# Patient Record
Sex: FEMALE | Race: WHITE | HISPANIC OR LATINO
[De-identification: names, ages, dates, MRNs, and addresses within clinical notes are randomized per-mention and may not be internally consistent; named-entity substitution may affect disease eponyms.]

---

## 2017-11-16 PROBLEM — Z00.00 ENCOUNTER FOR PREVENTIVE HEALTH EXAMINATION: Status: ACTIVE | Noted: 2017-11-16

## 2018-02-20 ENCOUNTER — APPOINTMENT (OUTPATIENT)
Dept: ENDOCRINOLOGY | Facility: CLINIC | Age: 83
End: 2018-02-20
Payer: MEDICARE

## 2018-02-20 VITALS
WEIGHT: 114 LBS | BODY MASS INDEX: 20.98 KG/M2 | OXYGEN SATURATION: 98 % | HEIGHT: 62 IN | DIASTOLIC BLOOD PRESSURE: 80 MMHG | TEMPERATURE: 97.9 F | SYSTOLIC BLOOD PRESSURE: 152 MMHG | HEART RATE: 66 BPM

## 2018-02-20 DIAGNOSIS — Z82.49 FAMILY HISTORY OF ISCHEMIC HEART DISEASE AND OTHER DISEASES OF THE CIRCULATORY SYSTEM: ICD-10-CM

## 2018-02-20 DIAGNOSIS — I10 ESSENTIAL (PRIMARY) HYPERTENSION: ICD-10-CM

## 2018-02-20 DIAGNOSIS — J44.9 CHRONIC OBSTRUCTIVE PULMONARY DISEASE, UNSPECIFIED: ICD-10-CM

## 2018-02-20 DIAGNOSIS — Z86.39 PERSONAL HISTORY OF OTHER ENDOCRINE, NUTRITIONAL AND METABOLIC DISEASE: ICD-10-CM

## 2018-02-20 DIAGNOSIS — Z86.79 PERSONAL HISTORY OF OTHER DISEASES OF THE CIRCULATORY SYSTEM: ICD-10-CM

## 2018-02-20 PROCEDURE — 99214 OFFICE O/P EST MOD 30 MIN: CPT

## 2018-06-01 ENCOUNTER — APPOINTMENT (OUTPATIENT)
Dept: ENDOCRINOLOGY | Facility: CLINIC | Age: 83
End: 2018-06-01
Payer: MEDICARE

## 2018-06-01 VITALS
RESPIRATION RATE: 15 BRPM | WEIGHT: 114 LBS | SYSTOLIC BLOOD PRESSURE: 131 MMHG | HEART RATE: 54 BPM | OXYGEN SATURATION: 97 % | HEIGHT: 62 IN | BODY MASS INDEX: 20.98 KG/M2 | DIASTOLIC BLOOD PRESSURE: 77 MMHG | TEMPERATURE: 97.7 F

## 2018-06-01 PROCEDURE — 99214 OFFICE O/P EST MOD 30 MIN: CPT

## 2018-06-03 RX ORDER — LOSARTAN POTASSIUM AND HYDROCHLOROTHIAZIDE 12.5; 1 MG/1; MG/1
100-12.5 TABLET ORAL
Qty: 90 | Refills: 0 | Status: COMPLETED | COMMUNITY
Start: 2018-05-11

## 2018-06-03 RX ORDER — UMECLIDINIUM 62.5 UG/1
62.5 AEROSOL, POWDER ORAL
Qty: 30 | Refills: 0 | Status: COMPLETED | COMMUNITY
Start: 2017-12-14

## 2018-06-03 RX ORDER — NEBIVOLOL HYDROCHLORIDE 5 MG/1
5 TABLET ORAL
Qty: 90 | Refills: 0 | Status: COMPLETED | COMMUNITY
Start: 2018-05-11

## 2018-09-04 ENCOUNTER — APPOINTMENT (OUTPATIENT)
Dept: ENDOCRINOLOGY | Facility: CLINIC | Age: 83
End: 2018-09-04
Payer: MEDICARE

## 2018-09-04 VITALS
BODY MASS INDEX: 20.98 KG/M2 | HEIGHT: 62 IN | DIASTOLIC BLOOD PRESSURE: 76 MMHG | TEMPERATURE: 97.7 F | RESPIRATION RATE: 15 BRPM | HEART RATE: 51 BPM | OXYGEN SATURATION: 97 % | SYSTOLIC BLOOD PRESSURE: 127 MMHG | WEIGHT: 114 LBS

## 2018-09-04 PROCEDURE — 99214 OFFICE O/P EST MOD 30 MIN: CPT

## 2018-09-09 ENCOUNTER — RX RENEWAL (OUTPATIENT)
Age: 83
End: 2018-09-09

## 2018-11-08 ENCOUNTER — APPOINTMENT (OUTPATIENT)
Dept: ENDOCRINOLOGY | Facility: CLINIC | Age: 83
End: 2018-11-08

## 2018-11-09 ENCOUNTER — APPOINTMENT (OUTPATIENT)
Dept: ENDOCRINOLOGY | Facility: CLINIC | Age: 83
End: 2018-11-09
Payer: MEDICARE

## 2018-11-09 VITALS
BODY MASS INDEX: 20.98 KG/M2 | WEIGHT: 114 LBS | TEMPERATURE: 97.3 F | OXYGEN SATURATION: 96 % | DIASTOLIC BLOOD PRESSURE: 72 MMHG | SYSTOLIC BLOOD PRESSURE: 150 MMHG | RESPIRATION RATE: 16 BRPM | HEART RATE: 60 BPM | HEIGHT: 62 IN

## 2018-11-09 PROCEDURE — 96372 THER/PROPH/DIAG INJ SC/IM: CPT | Mod: 59

## 2018-11-10 RX ADMIN — DENOSUMAB 0 MG/ML: 60 INJECTION SUBCUTANEOUS at 00:00

## 2018-11-12 RX ORDER — DENOSUMAB 60 MG/ML
60 INJECTION SUBCUTANEOUS
Qty: 1 | Refills: 0 | Status: COMPLETED | OUTPATIENT
Start: 2018-11-10

## 2018-12-04 ENCOUNTER — APPOINTMENT (OUTPATIENT)
Dept: ENDOCRINOLOGY | Facility: CLINIC | Age: 83
End: 2018-12-04
Payer: MEDICARE

## 2018-12-04 VITALS
HEIGHT: 62 IN | SYSTOLIC BLOOD PRESSURE: 135 MMHG | BODY MASS INDEX: 20.8 KG/M2 | DIASTOLIC BLOOD PRESSURE: 75 MMHG | TEMPERATURE: 97.8 F | HEART RATE: 57 BPM | RESPIRATION RATE: 16 BRPM | OXYGEN SATURATION: 97 % | WEIGHT: 113 LBS

## 2018-12-04 PROCEDURE — 99213 OFFICE O/P EST LOW 20 MIN: CPT

## 2019-03-08 ENCOUNTER — RX RENEWAL (OUTPATIENT)
Age: 84
End: 2019-03-08

## 2019-04-02 ENCOUNTER — APPOINTMENT (OUTPATIENT)
Dept: ENDOCRINOLOGY | Facility: CLINIC | Age: 84
End: 2019-04-02
Payer: MEDICARE

## 2019-04-02 VITALS
TEMPERATURE: 98.1 F | DIASTOLIC BLOOD PRESSURE: 77 MMHG | HEIGHT: 62 IN | OXYGEN SATURATION: 97 % | BODY MASS INDEX: 20.61 KG/M2 | RESPIRATION RATE: 16 BRPM | WEIGHT: 112 LBS | HEART RATE: 52 BPM | SYSTOLIC BLOOD PRESSURE: 159 MMHG

## 2019-04-02 PROCEDURE — 99214 OFFICE O/P EST MOD 30 MIN: CPT

## 2019-04-02 NOTE — HISTORY OF PRESENT ILLNESS
[FreeTextEntry1] : Patient is doing well, her weight has not change. Denies being fatigued. No cold or heat intolerance, no palpitations. No dryness of the skin or hair loss. No chest pain or SOB. Taking the Levothyroxine regularly ½ hour before breakfast. No history of neck radiation. The thyroid tests are within normal limits. The US thyroid done 3/17/18 is not ready yet\par

## 2019-04-02 NOTE — ASSESSMENT
[FreeTextEntry1] : Patient is clinically euthyroid\par Will continue the same treatment\par Patient will call next week for the US thyroid report\par She is due for her Prolia injection

## 2019-04-12 ENCOUNTER — APPOINTMENT (OUTPATIENT)
Dept: ENDOCRINOLOGY | Facility: CLINIC | Age: 84
End: 2019-04-12
Payer: MEDICARE

## 2019-04-12 PROCEDURE — 96372 THER/PROPH/DIAG INJ SC/IM: CPT

## 2019-04-12 RX ORDER — DENOSUMAB 60 MG/ML
60 INJECTION SUBCUTANEOUS
Qty: 0 | Refills: 0 | Status: COMPLETED | OUTPATIENT
Start: 2019-04-12

## 2019-04-12 RX ADMIN — DENOSUMAB 0 MG/ML: 60 INJECTION SUBCUTANEOUS at 00:00

## 2019-07-29 ENCOUNTER — APPOINTMENT (OUTPATIENT)
Dept: ENDOCRINOLOGY | Facility: CLINIC | Age: 84
End: 2019-07-29
Payer: MEDICARE

## 2019-07-29 VITALS
BODY MASS INDEX: 19.88 KG/M2 | WEIGHT: 108 LBS | HEIGHT: 62 IN | OXYGEN SATURATION: 97 % | DIASTOLIC BLOOD PRESSURE: 72 MMHG | RESPIRATION RATE: 16 BRPM | TEMPERATURE: 97.6 F | HEART RATE: 59 BPM | SYSTOLIC BLOOD PRESSURE: 162 MMHG

## 2019-07-29 PROCEDURE — 99214 OFFICE O/P EST MOD 30 MIN: CPT

## 2019-07-29 NOTE — HISTORY OF PRESENT ILLNESS
[FreeTextEntry1] : Patient is doing well, her weight has decreased. She feeling tired, no cold intolerance, no palpitations. No dryness of the skin, no hair loss. No chest pain or SOB. Taking the Levothyroxine regularly ½ hour before breakfast. No history of neck radiation.\par The thyroid tests are within normal limits. The US thyroid disclosed small nodules\par \par

## 2019-07-29 NOTE — PHYSICAL EXAM
[No Acute Distress] : no acute distress [Alert] : alert [Normal Sclera/Conjunctiva] : normal sclera/conjunctiva [Normal Outer Ear/Nose] : the ears and nose were normal in appearance [PERRL] : pupils equal, round and reactive to light [Normal TMs] : both tympanic membranes were normal [Thyroid Not Enlarged] : the thyroid was not enlarged [No Neck Mass] : no neck mass was observed [No Respiratory Distress] : no respiratory distress [Normal Rate and Effort] : normal respiratory rhythm and effort [Normal PMI] : the apical impulse was normal [Normal Rate] : heart rate was normal  [Carotids Normal] : carotid pulses were normal with no bruits [Normal Reflexes] : deep tendon reflexes were 2+ and symmetric [No Motor Deficits] : the motor exam was normal

## 2019-07-29 NOTE — ASSESSMENT
[FreeTextEntry1] : The patient is clinically euthyroid\par The thyroid nodules are small stable\par Will continue the same treatment\par Prolia was ordered for the osteoporosis

## 2019-10-08 ENCOUNTER — RX RENEWAL (OUTPATIENT)
Age: 84
End: 2019-10-08

## 2019-10-16 ENCOUNTER — APPOINTMENT (OUTPATIENT)
Dept: ENDOCRINOLOGY | Facility: CLINIC | Age: 84
End: 2019-10-16
Payer: MEDICARE

## 2019-10-16 VITALS — SYSTOLIC BLOOD PRESSURE: 122 MMHG | DIASTOLIC BLOOD PRESSURE: 74 MMHG

## 2019-10-16 VITALS — DIASTOLIC BLOOD PRESSURE: 76 MMHG | HEART RATE: 61 BPM | SYSTOLIC BLOOD PRESSURE: 126 MMHG | RESPIRATION RATE: 16 BRPM

## 2019-10-16 PROCEDURE — 96372 THER/PROPH/DIAG INJ SC/IM: CPT

## 2019-10-16 RX ORDER — DENOSUMAB 60 MG/ML
60 INJECTION SUBCUTANEOUS
Qty: 1 | Refills: 0 | Status: COMPLETED | OUTPATIENT
Start: 2019-10-16

## 2019-10-16 RX ADMIN — DENOSUMAB 0 MG/ML: 60 INJECTION SUBCUTANEOUS at 00:00

## 2019-11-06 ENCOUNTER — RX RENEWAL (OUTPATIENT)
Age: 84
End: 2019-11-06

## 2019-12-02 ENCOUNTER — APPOINTMENT (OUTPATIENT)
Dept: ENDOCRINOLOGY | Facility: CLINIC | Age: 84
End: 2019-12-02
Payer: MEDICARE

## 2019-12-02 VITALS
TEMPERATURE: 97.3 F | WEIGHT: 110 LBS | OXYGEN SATURATION: 94 % | RESPIRATION RATE: 16 BRPM | SYSTOLIC BLOOD PRESSURE: 132 MMHG | HEIGHT: 62 IN | HEART RATE: 81 BPM | BODY MASS INDEX: 20.24 KG/M2 | DIASTOLIC BLOOD PRESSURE: 73 MMHG

## 2019-12-02 PROCEDURE — 99214 OFFICE O/P EST MOD 30 MIN: CPT

## 2019-12-02 NOTE — PHYSICAL EXAM
[Alert] : alert [No Acute Distress] : no acute distress [PERRL] : pupils equal, round and reactive to light [Normal Outer Ear/Nose] : the ears and nose were normal in appearance [Normal Sclera/Conjunctiva] : normal sclera/conjunctiva [Normal TMs] : both tympanic membranes were normal [Thyroid Not Enlarged] : the thyroid was not enlarged [No Neck Mass] : no neck mass was observed [No Respiratory Distress] : no respiratory distress [Normal Rate and Effort] : normal respiratory rhythm and effort [Normal PMI] : the apical impulse was normal [Normal Rate] : heart rate was normal  [Carotids Normal] : carotid pulses were normal with no bruits [Normal Reflexes] : deep tendon reflexes were 2+ and symmetric [No Motor Deficits] : the motor exam was normal

## 2019-12-02 NOTE — HISTORY OF PRESENT ILLNESS
[FreeTextEntry1] : Patient is doing well, denies feeling tired, having cold intolerance, or palpitations. No dryness of the skin or hair loss. No chest pain or SOB. Taking the Levothyroxine regularly ½ hour before breakfast. Her weight has not changed.  No history of neck radiation. The thyroid tests are within normal limits. The US thyroid was not done. She has chronic cough she is in Lisinopril. Advised to speak with PCP.\par \par

## 2019-12-02 NOTE — ASSESSMENT
[FreeTextEntry1] : The patient is clinically euthyroid\par The US thyroid was denied by the insurance\par Her Na is low and she has chronic cough\par Advised to see her PCP to check the Lisinopril

## 2020-05-12 RX ORDER — DENOSUMAB 60 MG/ML
60 INJECTION SUBCUTANEOUS
Qty: 1 | Refills: 0 | Status: COMPLETED | COMMUNITY
Start: 2018-09-09 | End: 2020-05-12

## 2020-05-22 ENCOUNTER — APPOINTMENT (OUTPATIENT)
Dept: ENDOCRINOLOGY | Facility: CLINIC | Age: 85
End: 2020-05-22
Payer: MEDICARE

## 2020-05-22 VITALS — TEMPERATURE: 98 F | DIASTOLIC BLOOD PRESSURE: 83 MMHG | SYSTOLIC BLOOD PRESSURE: 153 MMHG

## 2020-05-22 VITALS — DIASTOLIC BLOOD PRESSURE: 86 MMHG | SYSTOLIC BLOOD PRESSURE: 157 MMHG

## 2020-05-22 PROCEDURE — 96372 THER/PROPH/DIAG INJ SC/IM: CPT

## 2020-05-22 RX ORDER — DENOSUMAB 60 MG/ML
60 INJECTION SUBCUTANEOUS
Qty: 1 | Refills: 0 | Status: COMPLETED | OUTPATIENT
Start: 2020-05-22

## 2020-05-22 RX ADMIN — DENOSUMAB 0 MG/ML: 60 INJECTION SUBCUTANEOUS at 00:00

## 2020-06-13 ENCOUNTER — INPATIENT (INPATIENT)
Facility: HOSPITAL | Age: 85
LOS: 4 days | Discharge: HOME HEALTH | DRG: 640 | End: 2020-06-18
Attending: INTERNAL MEDICINE | Admitting: INTERNAL MEDICINE
Payer: MEDICARE

## 2020-06-13 VITALS
HEART RATE: 70 BPM | DIASTOLIC BLOOD PRESSURE: 95 MMHG | SYSTOLIC BLOOD PRESSURE: 174 MMHG | OXYGEN SATURATION: 100 % | WEIGHT: 111.99 LBS | RESPIRATION RATE: 18 BRPM | TEMPERATURE: 97 F

## 2020-06-13 DIAGNOSIS — E87.1 HYPO-OSMOLALITY AND HYPONATREMIA: ICD-10-CM

## 2020-06-13 DIAGNOSIS — Z98.891 HISTORY OF UTERINE SCAR FROM PREVIOUS SURGERY: Chronic | ICD-10-CM

## 2020-06-13 LAB
ACETONE SERPL-MCNC: NEGATIVE — SIGNIFICANT CHANGE UP
ALBUMIN SERPL ELPH-MCNC: 3.3 G/DL — LOW (ref 3.5–5)
ALBUMIN SERPL ELPH-MCNC: 3.5 G/DL — SIGNIFICANT CHANGE UP (ref 3.5–5)
ALP SERPL-CCNC: 79 U/L — SIGNIFICANT CHANGE UP (ref 40–120)
ALP SERPL-CCNC: 94 U/L — SIGNIFICANT CHANGE UP (ref 40–120)
ALT FLD-CCNC: 43 U/L DA — SIGNIFICANT CHANGE UP (ref 10–60)
ALT FLD-CCNC: 44 U/L DA — SIGNIFICANT CHANGE UP (ref 10–60)
ANION GAP SERPL CALC-SCNC: 11 MMOL/L — SIGNIFICANT CHANGE UP (ref 5–17)
ANION GAP SERPL CALC-SCNC: 12 MMOL/L — SIGNIFICANT CHANGE UP (ref 5–17)
ANION GAP SERPL CALC-SCNC: 12 MMOL/L — SIGNIFICANT CHANGE UP (ref 5–17)
APPEARANCE UR: CLEAR — SIGNIFICANT CHANGE UP
APTT BLD: 30.6 SEC — SIGNIFICANT CHANGE UP (ref 27.5–36.3)
AST SERPL-CCNC: 36 U/L — SIGNIFICANT CHANGE UP (ref 10–40)
AST SERPL-CCNC: 36 U/L — SIGNIFICANT CHANGE UP (ref 10–40)
BACTERIA # UR AUTO: ABNORMAL /HPF
BASOPHILS # BLD AUTO: 0.04 K/UL — SIGNIFICANT CHANGE UP (ref 0–0.2)
BASOPHILS NFR BLD AUTO: 0.6 % — SIGNIFICANT CHANGE UP (ref 0–2)
BILIRUB SERPL-MCNC: 0.6 MG/DL — SIGNIFICANT CHANGE UP (ref 0.2–1.2)
BILIRUB SERPL-MCNC: 0.8 MG/DL — SIGNIFICANT CHANGE UP (ref 0.2–1.2)
BILIRUB UR-MCNC: NEGATIVE — SIGNIFICANT CHANGE UP
BUN SERPL-MCNC: 16 MG/DL — SIGNIFICANT CHANGE UP (ref 7–18)
BUN SERPL-MCNC: 20 MG/DL — HIGH (ref 7–18)
BUN SERPL-MCNC: 22 MG/DL — HIGH (ref 7–18)
CALCIUM SERPL-MCNC: 8.3 MG/DL — LOW (ref 8.4–10.5)
CALCIUM SERPL-MCNC: 8.4 MG/DL — SIGNIFICANT CHANGE UP (ref 8.4–10.5)
CALCIUM SERPL-MCNC: 8.8 MG/DL — SIGNIFICANT CHANGE UP (ref 8.4–10.5)
CHLORIDE SERPL-SCNC: 72 MMOL/L — LOW (ref 96–108)
CHLORIDE SERPL-SCNC: 75 MMOL/L — LOW (ref 96–108)
CHLORIDE SERPL-SCNC: 80 MMOL/L — LOW (ref 96–108)
CHLORIDE UR-SCNC: 13 MMOL/L — SIGNIFICANT CHANGE UP
CO2 SERPL-SCNC: 29 MMOL/L — SIGNIFICANT CHANGE UP (ref 22–31)
CO2 SERPL-SCNC: 29 MMOL/L — SIGNIFICANT CHANGE UP (ref 22–31)
CO2 SERPL-SCNC: 30 MMOL/L — SIGNIFICANT CHANGE UP (ref 22–31)
COLOR SPEC: YELLOW — SIGNIFICANT CHANGE UP
CREAT ?TM UR-MCNC: <13 MG/DL — SIGNIFICANT CHANGE UP
CREAT SERPL-MCNC: 0.76 MG/DL — SIGNIFICANT CHANGE UP (ref 0.5–1.3)
CREAT SERPL-MCNC: 0.81 MG/DL — SIGNIFICANT CHANGE UP (ref 0.5–1.3)
CREAT SERPL-MCNC: 0.95 MG/DL — SIGNIFICANT CHANGE UP (ref 0.5–1.3)
D DIMER BLD IA.RAPID-MCNC: 179 NG/ML DDU — SIGNIFICANT CHANGE UP
DIFF PNL FLD: ABNORMAL
EOSINOPHIL # BLD AUTO: 0.03 K/UL — SIGNIFICANT CHANGE UP (ref 0–0.5)
EOSINOPHIL NFR BLD AUTO: 0.4 % — SIGNIFICANT CHANGE UP (ref 0–6)
EPI CELLS # UR: ABNORMAL /HPF
FERRITIN SERPL-MCNC: 342 NG/ML — HIGH (ref 15–150)
FIBRINOGEN PPP-MCNC: 453 MG/DL — SIGNIFICANT CHANGE UP (ref 350–510)
GLUCOSE SERPL-MCNC: 105 MG/DL — HIGH (ref 70–99)
GLUCOSE SERPL-MCNC: 107 MG/DL — HIGH (ref 70–99)
GLUCOSE SERPL-MCNC: 120 MG/DL — HIGH (ref 70–99)
GLUCOSE UR QL: 50 MG/DL
HCT VFR BLD CALC: 32.9 % — LOW (ref 34.5–45)
HGB BLD-MCNC: 11.6 G/DL — SIGNIFICANT CHANGE UP (ref 11.5–15.5)
IMM GRANULOCYTES NFR BLD AUTO: 0.3 % — SIGNIFICANT CHANGE UP (ref 0–1.5)
INR BLD: 0.96 RATIO — SIGNIFICANT CHANGE UP (ref 0.88–1.16)
KETONES UR-MCNC: NEGATIVE — SIGNIFICANT CHANGE UP
LACTATE SERPL-SCNC: 0.8 MMOL/L — SIGNIFICANT CHANGE UP (ref 0.7–2)
LDH SERPL L TO P-CCNC: 244 U/L — HIGH (ref 120–225)
LEUKOCYTE ESTERASE UR-ACNC: NEGATIVE — SIGNIFICANT CHANGE UP
LYMPHOCYTES # BLD AUTO: 1.26 K/UL — SIGNIFICANT CHANGE UP (ref 1–3.3)
LYMPHOCYTES # BLD AUTO: 17.8 % — SIGNIFICANT CHANGE UP (ref 13–44)
MAGNESIUM SERPL-MCNC: 2 MG/DL — SIGNIFICANT CHANGE UP (ref 1.6–2.6)
MCHC RBC-ENTMCNC: 28.8 PG — SIGNIFICANT CHANGE UP (ref 27–34)
MCHC RBC-ENTMCNC: 35.3 GM/DL — SIGNIFICANT CHANGE UP (ref 32–36)
MCV RBC AUTO: 81.6 FL — SIGNIFICANT CHANGE UP (ref 80–100)
MONOCYTES # BLD AUTO: 0.61 K/UL — SIGNIFICANT CHANGE UP (ref 0–0.9)
MONOCYTES NFR BLD AUTO: 8.6 % — SIGNIFICANT CHANGE UP (ref 2–14)
NEUTROPHILS # BLD AUTO: 5.1 K/UL — SIGNIFICANT CHANGE UP (ref 1.8–7.4)
NEUTROPHILS NFR BLD AUTO: 72.3 % — SIGNIFICANT CHANGE UP (ref 43–77)
NITRITE UR-MCNC: NEGATIVE — SIGNIFICANT CHANGE UP
NRBC # BLD: 0 /100 WBCS — SIGNIFICANT CHANGE UP (ref 0–0)
NT-PROBNP SERPL-SCNC: 505 PG/ML — HIGH (ref 0–450)
OB PNL STL: NEGATIVE — SIGNIFICANT CHANGE UP
OSMOLALITY SERPL: 238 MOSMOL/KG — LOW (ref 280–301)
OSMOLALITY UR: 109 MOS/KG — SIGNIFICANT CHANGE UP (ref 50–1200)
OSMOLALITY UR: 110 MOS/KG — SIGNIFICANT CHANGE UP (ref 50–1200)
PH UR: 7 — SIGNIFICANT CHANGE UP (ref 5–8)
PLATELET # BLD AUTO: 232 K/UL — SIGNIFICANT CHANGE UP (ref 150–400)
POTASSIUM SERPL-MCNC: 3.4 MMOL/L — LOW (ref 3.5–5.3)
POTASSIUM SERPL-MCNC: 3.5 MMOL/L — SIGNIFICANT CHANGE UP (ref 3.5–5.3)
POTASSIUM SERPL-MCNC: 3.9 MMOL/L — SIGNIFICANT CHANGE UP (ref 3.5–5.3)
POTASSIUM SERPL-SCNC: 3.4 MMOL/L — LOW (ref 3.5–5.3)
POTASSIUM SERPL-SCNC: 3.5 MMOL/L — SIGNIFICANT CHANGE UP (ref 3.5–5.3)
POTASSIUM SERPL-SCNC: 3.9 MMOL/L — SIGNIFICANT CHANGE UP (ref 3.5–5.3)
POTASSIUM UR-SCNC: 7 MMOL/L — SIGNIFICANT CHANGE UP
POTASSIUM UR-SCNC: 8 MMOL/L — SIGNIFICANT CHANGE UP
PROT ?TM UR-MCNC: 6 MG/DL — SIGNIFICANT CHANGE UP (ref 0–12)
PROT SERPL-MCNC: 6.5 G/DL — SIGNIFICANT CHANGE UP (ref 6–8.3)
PROT SERPL-MCNC: 7.1 G/DL — SIGNIFICANT CHANGE UP (ref 6–8.3)
PROT UR-MCNC: NEGATIVE — SIGNIFICANT CHANGE UP
PROTHROM AB SERPL-ACNC: 10.8 SEC — SIGNIFICANT CHANGE UP (ref 10–12.9)
RBC # BLD: 4.03 M/UL — SIGNIFICANT CHANGE UP (ref 3.8–5.2)
RBC # FLD: 12.8 % — SIGNIFICANT CHANGE UP (ref 10.3–14.5)
RBC CASTS # UR COMP ASSIST: SIGNIFICANT CHANGE UP /HPF (ref 0–2)
SODIUM SERPL-SCNC: 113 MMOL/L — CRITICAL LOW (ref 135–145)
SODIUM SERPL-SCNC: 116 MMOL/L — CRITICAL LOW (ref 135–145)
SODIUM SERPL-SCNC: 121 MMOL/L — LOW (ref 135–145)
SODIUM UR-SCNC: 17 MMOL/L — SIGNIFICANT CHANGE UP
SODIUM UR-SCNC: 19 MMOL/L — SIGNIFICANT CHANGE UP
SP GR SPEC: 1 — LOW (ref 1.01–1.02)
TROPONIN I SERPL-MCNC: 0.01 NG/ML — SIGNIFICANT CHANGE UP (ref 0–0.04)
URATE UR-MCNC: 8.3 MG/DL — SIGNIFICANT CHANGE UP
UROBILINOGEN FLD QL: NEGATIVE — SIGNIFICANT CHANGE UP
WBC # BLD: 7.06 K/UL — SIGNIFICANT CHANGE UP (ref 3.8–10.5)
WBC # FLD AUTO: 7.06 K/UL — SIGNIFICANT CHANGE UP (ref 3.8–10.5)
WBC UR QL: SIGNIFICANT CHANGE UP /HPF (ref 0–5)

## 2020-06-13 PROCEDURE — 99291 CRITICAL CARE FIRST HOUR: CPT

## 2020-06-13 PROCEDURE — 71045 X-RAY EXAM CHEST 1 VIEW: CPT | Mod: 26

## 2020-06-13 PROCEDURE — 93010 ELECTROCARDIOGRAM REPORT: CPT

## 2020-06-13 RX ORDER — CHLORHEXIDINE GLUCONATE 213 G/1000ML
1 SOLUTION TOPICAL DAILY
Refills: 0 | Status: DISCONTINUED | OUTPATIENT
Start: 2020-06-13 | End: 2020-06-15

## 2020-06-13 RX ORDER — POTASSIUM CHLORIDE 20 MEQ
40 PACKET (EA) ORAL ONCE
Refills: 0 | Status: COMPLETED | OUTPATIENT
Start: 2020-06-13 | End: 2020-06-13

## 2020-06-13 RX ORDER — AZITHROMYCIN 500 MG/1
500 TABLET, FILM COATED ORAL EVERY 24 HOURS
Refills: 0 | Status: DISCONTINUED | OUTPATIENT
Start: 2020-06-14 | End: 2020-06-17

## 2020-06-13 RX ORDER — SODIUM CHLORIDE 9 MG/ML
1000 INJECTION, SOLUTION INTRAVENOUS
Refills: 0 | Status: DISCONTINUED | OUTPATIENT
Start: 2020-06-13 | End: 2020-06-14

## 2020-06-13 RX ORDER — AZITHROMYCIN 500 MG/1
500 TABLET, FILM COATED ORAL ONCE
Refills: 0 | Status: COMPLETED | OUTPATIENT
Start: 2020-06-13 | End: 2020-06-13

## 2020-06-13 RX ORDER — POTASSIUM CHLORIDE 20 MEQ
40 PACKET (EA) ORAL ONCE
Refills: 0 | Status: DISCONTINUED | OUTPATIENT
Start: 2020-06-13 | End: 2020-06-13

## 2020-06-13 RX ORDER — PANTOPRAZOLE SODIUM 20 MG/1
40 TABLET, DELAYED RELEASE ORAL
Refills: 0 | Status: DISCONTINUED | OUTPATIENT
Start: 2020-06-13 | End: 2020-06-18

## 2020-06-13 RX ORDER — LOSARTAN POTASSIUM 100 MG/1
100 TABLET, FILM COATED ORAL DAILY
Refills: 0 | Status: DISCONTINUED | OUTPATIENT
Start: 2020-06-13 | End: 2020-06-13

## 2020-06-13 RX ORDER — CEFTRIAXONE 500 MG/1
1000 INJECTION, POWDER, FOR SOLUTION INTRAMUSCULAR; INTRAVENOUS EVERY 24 HOURS
Refills: 0 | Status: DISCONTINUED | OUTPATIENT
Start: 2020-06-14 | End: 2020-06-18

## 2020-06-13 RX ORDER — ENOXAPARIN SODIUM 100 MG/ML
40 INJECTION SUBCUTANEOUS DAILY
Refills: 0 | Status: DISCONTINUED | OUTPATIENT
Start: 2020-06-13 | End: 2020-06-18

## 2020-06-13 RX ORDER — SODIUM CHLORIDE 9 MG/ML
500 INJECTION INTRAMUSCULAR; INTRAVENOUS; SUBCUTANEOUS ONCE
Refills: 0 | Status: COMPLETED | OUTPATIENT
Start: 2020-06-13 | End: 2020-06-13

## 2020-06-13 RX ORDER — LEVOTHYROXINE SODIUM 125 MCG
75 TABLET ORAL
Refills: 0 | Status: DISCONTINUED | OUTPATIENT
Start: 2020-06-13 | End: 2020-06-18

## 2020-06-13 RX ORDER — DESMOPRESSIN ACETATE 0.1 MG/1
1 TABLET ORAL ONCE
Refills: 0 | Status: COMPLETED | OUTPATIENT
Start: 2020-06-13 | End: 2020-06-13

## 2020-06-13 RX ORDER — CEFTRIAXONE 500 MG/1
1000 INJECTION, POWDER, FOR SOLUTION INTRAMUSCULAR; INTRAVENOUS ONCE
Refills: 0 | Status: COMPLETED | OUTPATIENT
Start: 2020-06-13 | End: 2020-06-13

## 2020-06-13 RX ORDER — LEVOTHYROXINE SODIUM 125 MCG
50 TABLET ORAL
Refills: 0 | Status: DISCONTINUED | OUTPATIENT
Start: 2020-06-13 | End: 2020-06-18

## 2020-06-13 RX ADMIN — Medication 40 MILLIEQUIVALENT(S): at 19:47

## 2020-06-13 RX ADMIN — SODIUM CHLORIDE 100 MILLILITER(S): 9 INJECTION, SOLUTION INTRAVENOUS at 21:38

## 2020-06-13 RX ADMIN — CEFTRIAXONE 100 MILLIGRAM(S): 500 INJECTION, POWDER, FOR SOLUTION INTRAMUSCULAR; INTRAVENOUS at 16:55

## 2020-06-13 RX ADMIN — SODIUM CHLORIDE 500 MILLILITER(S): 9 INJECTION INTRAMUSCULAR; INTRAVENOUS; SUBCUTANEOUS at 18:17

## 2020-06-13 RX ADMIN — Medication 40 MILLIEQUIVALENT(S): at 18:15

## 2020-06-13 RX ADMIN — DESMOPRESSIN ACETATE 1 MICROGRAM(S): 0.1 TABLET ORAL at 21:38

## 2020-06-13 RX ADMIN — AZITHROMYCIN 255 MILLIGRAM(S): 500 TABLET, FILM COATED ORAL at 16:55

## 2020-06-13 NOTE — ED PROVIDER NOTE - CARE PLAN
Principal Discharge DX:	Hyponatremia  Secondary Diagnosis:	PNA (pneumonia)  Secondary Diagnosis:	Weakness

## 2020-06-13 NOTE — H&P ADULT - ATTENDING COMMENTS
84 yo F from home, AAOX3, ambulates with cane with PMH of HTN and hypothyroidism BIBA to ED c/o feeling fatigue and generalized weakness for past 3 weeks, MADDEN and decreased appetite. Found Na 113, and CXR noted b/l lower lobes infiltrates. Admit to ICU for Hyponatremia and PNA       Problems:   - Hyponatremia Symptomatic   - PNA,   - COVID-19  need to be r/o  - HTN  - Hypothyroidism       Plan :  -admit pat to icu  -isolate: contact and airborn  -hyponatremia work up  -Na correction 12 mEq/24 hr  -avoid over correction   -BMP q4h   -hyponatremia probable due to thiazide diuretics  -neph eval  -blood pressure control

## 2020-06-13 NOTE — ED ADULT NURSE NOTE - ED STAT RN HANDOFF DETAILS
Patient remains aler6t and verbally responsive breathing unlabored awaiting for ICU bed availability, Pt care endorsed to Sabi JOHNSON, safety and comfort maintained.

## 2020-06-13 NOTE — ED ADULT NURSE NOTE - OBJECTIVE STATEMENT
BIB EMS from home with c/o generalized weakness for past 3 weeks, MADDEN, decreased appetite ,denies chest pain, cough, dizziness

## 2020-06-13 NOTE — H&P ADULT - NSHPLABSRESULTS_GEN_ALL_CORE
11.6   7.06  )-----------( 232      ( 13 Jun 2020 14:13 )             32.9       06-13    x   |  x   |  x   ----------------------------<  x   x    |  x   |  0.81    Ca    8.4      13 Jun 2020 14:13  Mg     2.0     06-13    TPro  7.1  /  Alb  x   /  TBili  0.8  /  DBili  x   /  AST  36  /  ALT  44  /  AlkPhos  94  06-13                  PT/INR - ( 13 Jun 2020 14:13 )   PT: 10.8 sec;   INR: 0.96 ratio         PTT - ( 13 Jun 2020 14:13 )  PTT:30.6 sec    Lactate Trend  06-13 @ 16:46 Lactate:0.8       CARDIAC MARKERS ( 13 Jun 2020 14:13 )  0.015 ng/mL / x     / x     / x     / x            CAPILLARY BLOOD GLUCOSE

## 2020-06-13 NOTE — ED ADULT NURSE NOTE - DOES PATIENT HAVE ADVANCE DIRECTIVE
Hide Additional Notes?: No Detail Level: Detailed Additional Notes (Optional): Vitamin K. Arnica gel. Pineapples. Pt has those at home. Try to avoid trauma. unkn/No

## 2020-06-13 NOTE — ED PROVIDER NOTE - OBJECTIVE STATEMENT
85 y.o. female BIBA c/o feeling tired, weak for past 3 weeks, MADDEN, pt needs to ambulate with a cane, mild chills, decreased appetite, no CP, edema, palpitations, coughing, dizziness, orthopnea, last BM this am, no BRBPR, fever, pt lives with her .  Pt checked her B/P, was high

## 2020-06-13 NOTE — H&P ADULT - NSHPPHYSICALEXAM_GEN_ALL_CORE
ICU Vital Signs Last 24 Hrs  T(C): 36.7 (13 Jun 2020 15:47), Max: 36.7 (13 Jun 2020 15:47)  T(F): 98 (13 Jun 2020 15:47), Max: 98 (13 Jun 2020 15:47)  HR: 71 (13 Jun 2020 15:47) (70 - 71)  BP: 172/87 (13 Jun 2020 15:47) (172/87 - 174/95)  BP(mean): --  ABP: --  ABP(mean): --  RR: 18 (13 Jun 2020 15:47) (18 - 18)  SpO2: 97% (13 Jun 2020 15:47) (97% - 100%)

## 2020-06-13 NOTE — ED ADULT NURSE NOTE - NSIMPLEMENTINTERV_GEN_ALL_ED
Implemented All Fall Risk Interventions:  [place] to call system. Call bell, personal items and telephone within reach. Instruct patient to call for assistance. Room bathroom lighting operational. Non-slip footwear when patient is off stretcher. Physically safe environment: no spills, clutter or unnecessary equipment. Stretcher in lowest position, wheels locked, appropriate side rails in place. Provide visual cue, wrist band, yellow gown, etc. Monitor gait and stability. Monitor for mental status changes and reorient to person, place, and time. Review medications for side effects contributing to fall risk. Reinforce activity limits and safety measures with patient and family.

## 2020-06-13 NOTE — H&P ADULT - ASSESSMENT
84 yo F from home, AAOX3, ambulates with cane with PMH of HTN and hypothyroidism BIBA to ED c/o feeling fatigue and generalized weakness for past 3 weeks, MADDEN and decreased appetite. Pt checked her B/P, was high 190/80.  Pt denies fever, CP, edema, palpitations, coughing, dizziness, orthopnea, abd pain, n/v/c/d. Last BM this am.     ED course, vitals noted temp 97, HR 70, /95, RR 18, SO2 97% on RA. Labs noted Na 113,  CXR noted b/l lower lobes infiltrates. s/p zithromax and rocephin in ED.       Plan:     Neuro:   Respi:   CVS:   GI:   Heme:   Endo:   ID:  Renal:   PPx: 86 yo F from home, AAOX3, ambulates with cane with PMH of HTN and hypothyroidism BIBA to ED c/o feeling fatigue and generalized weakness for past 3 weeks, MADDEN and decreased appetite. Found Na 113, and CXR noted b/l lower lobes infiltrates. Admit to ICU for Hyponatremia and PNA       Problems:   -Hyponatremia   -PNA, r/o COVID   -HTN  -Hypothyroidism     Plan:  Neuro:   AAOX3, no issue     Respi:   Saturation 97% on RA , comfortable, no sob/work of breath     CVS:   HTN  -hold HCTZ, resume losartan and bystolic     GI:   Dash/fluid restriction diet    Heme:   no issue     Endo:   Hypothyroidism  -c/w synthroid home dose   -f/u TSH  keep FS   f/u Hb A1c    ID:  PNA  -CXR noted b/l lower lobes infiltrates.   -c/w zithromax and rocephin   -f/u BCx and r/o COVID       Renal:   Hyponatremia 2/2 diuretics vs SIADH   -Na 113  -will give NS 500cc  -f/u urine lytes, urine osmolarity and serum osmolarity   -BMP q4hrs and Urine Na/K/Osmo q4hrs   -Nephro consulted Dr. Gupta     PPx: on dvt and gi ppx    GOC: DNR/DNI/no invasive procedure.

## 2020-06-13 NOTE — H&P ADULT - HISTORY OF PRESENT ILLNESS
84 yo F from home, AAOX3, ambulates with cane with PMH of HTN and hypothyroidism BIBA to ED c/o feeling fatigue and generalized weakness for past 3 weeks, MADDEN and decreased appetite. Pt checked her B/P, was high 190/80.  Pt denies fever, CP, edema, palpitations, coughing, dizziness, orthopnea, abd pain, n/v/c/d. Last BM this am.     ED course, vitals noted temp 97, HR 70, /95, RR 18, SO2 97% on RA. Labs noted Na 113,  CXR noted b/l lower lobes infiltrates. s/p zithromax and rocephin in ED.     GOC: DNR/DNI/NO invasive procedure. HCP Nicole Nataly [phone].

## 2020-06-13 NOTE — ED ADULT NURSE REASSESSMENT NOTE - NS ED NURSE REASSESS COMMENT FT1
received pt awake alert not in acute distress,with saline lock intact,no redness no swelling noted,hooked to cardiac monitor,waiting for ICU bed.

## 2020-06-14 DIAGNOSIS — J18.9 PNEUMONIA, UNSPECIFIED ORGANISM: ICD-10-CM

## 2020-06-14 DIAGNOSIS — E87.1 HYPO-OSMOLALITY AND HYPONATREMIA: ICD-10-CM

## 2020-06-14 DIAGNOSIS — J44.9 CHRONIC OBSTRUCTIVE PULMONARY DISEASE, UNSPECIFIED: ICD-10-CM

## 2020-06-14 DIAGNOSIS — I10 ESSENTIAL (PRIMARY) HYPERTENSION: ICD-10-CM

## 2020-06-14 LAB
A1C WITH ESTIMATED AVERAGE GLUCOSE RESULT: 5.5 % — SIGNIFICANT CHANGE UP (ref 4–5.6)
ALBUMIN SERPL ELPH-MCNC: 3 G/DL — LOW (ref 3.5–5)
ALP SERPL-CCNC: 64 U/L — SIGNIFICANT CHANGE UP (ref 40–120)
ALT FLD-CCNC: 37 U/L DA — SIGNIFICANT CHANGE UP (ref 10–60)
ANION GAP SERPL CALC-SCNC: 10 MMOL/L — SIGNIFICANT CHANGE UP (ref 5–17)
ANION GAP SERPL CALC-SCNC: 11 MMOL/L — SIGNIFICANT CHANGE UP (ref 5–17)
ANION GAP SERPL CALC-SCNC: 8 MMOL/L — SIGNIFICANT CHANGE UP (ref 5–17)
ANION GAP SERPL CALC-SCNC: 9 MMOL/L — SIGNIFICANT CHANGE UP (ref 5–17)
AST SERPL-CCNC: 31 U/L — SIGNIFICANT CHANGE UP (ref 10–40)
BASOPHILS # BLD AUTO: 0.03 K/UL — SIGNIFICANT CHANGE UP (ref 0–0.2)
BASOPHILS NFR BLD AUTO: 0.5 % — SIGNIFICANT CHANGE UP (ref 0–2)
BILIRUB SERPL-MCNC: 0.6 MG/DL — SIGNIFICANT CHANGE UP (ref 0.2–1.2)
BUN SERPL-MCNC: 10 MG/DL — SIGNIFICANT CHANGE UP (ref 7–18)
BUN SERPL-MCNC: 10 MG/DL — SIGNIFICANT CHANGE UP (ref 7–18)
BUN SERPL-MCNC: 11 MG/DL — SIGNIFICANT CHANGE UP (ref 7–18)
BUN SERPL-MCNC: 13 MG/DL — SIGNIFICANT CHANGE UP (ref 7–18)
BUN SERPL-MCNC: 14 MG/DL — SIGNIFICANT CHANGE UP (ref 7–18)
BUN SERPL-MCNC: 15 MG/DL — SIGNIFICANT CHANGE UP (ref 7–18)
CALCIUM SERPL-MCNC: 7.2 MG/DL — LOW (ref 8.4–10.5)
CALCIUM SERPL-MCNC: 7.3 MG/DL — LOW (ref 8.4–10.5)
CALCIUM SERPL-MCNC: 7.6 MG/DL — LOW (ref 8.4–10.5)
CALCIUM SERPL-MCNC: 7.9 MG/DL — LOW (ref 8.4–10.5)
CALCIUM SERPL-MCNC: 8 MG/DL — LOW (ref 8.4–10.5)
CALCIUM SERPL-MCNC: 8.1 MG/DL — LOW (ref 8.4–10.5)
CHLORIDE SERPL-SCNC: 79 MMOL/L — LOW (ref 96–108)
CHLORIDE SERPL-SCNC: 80 MMOL/L — LOW (ref 96–108)
CHLORIDE SERPL-SCNC: 81 MMOL/L — LOW (ref 96–108)
CHLORIDE SERPL-SCNC: 82 MMOL/L — LOW (ref 96–108)
CHLORIDE SERPL-SCNC: 83 MMOL/L — LOW (ref 96–108)
CHLORIDE SERPL-SCNC: 83 MMOL/L — LOW (ref 96–108)
CO2 SERPL-SCNC: 25 MMOL/L — SIGNIFICANT CHANGE UP (ref 22–31)
CO2 SERPL-SCNC: 26 MMOL/L — SIGNIFICANT CHANGE UP (ref 22–31)
CO2 SERPL-SCNC: 26 MMOL/L — SIGNIFICANT CHANGE UP (ref 22–31)
CO2 SERPL-SCNC: 29 MMOL/L — SIGNIFICANT CHANGE UP (ref 22–31)
CO2 SERPL-SCNC: 30 MMOL/L — SIGNIFICANT CHANGE UP (ref 22–31)
CO2 SERPL-SCNC: 30 MMOL/L — SIGNIFICANT CHANGE UP (ref 22–31)
CREAT ?TM UR-MCNC: 21 MG/DL — SIGNIFICANT CHANGE UP
CREAT SERPL-MCNC: 0.55 MG/DL — SIGNIFICANT CHANGE UP (ref 0.5–1.3)
CREAT SERPL-MCNC: 0.56 MG/DL — SIGNIFICANT CHANGE UP (ref 0.5–1.3)
CREAT SERPL-MCNC: 0.58 MG/DL — SIGNIFICANT CHANGE UP (ref 0.5–1.3)
CREAT SERPL-MCNC: 0.6 MG/DL — SIGNIFICANT CHANGE UP (ref 0.5–1.3)
CREAT SERPL-MCNC: 0.61 MG/DL — SIGNIFICANT CHANGE UP (ref 0.5–1.3)
CREAT SERPL-MCNC: 0.69 MG/DL — SIGNIFICANT CHANGE UP (ref 0.5–1.3)
CRP SERPL-MCNC: <0.1 MG/DL — SIGNIFICANT CHANGE UP (ref 0–0.4)
EOSINOPHIL # BLD AUTO: 0.04 K/UL — SIGNIFICANT CHANGE UP (ref 0–0.5)
EOSINOPHIL NFR BLD AUTO: 0.7 % — SIGNIFICANT CHANGE UP (ref 0–6)
ESTIMATED AVERAGE GLUCOSE: 111 MG/DL — SIGNIFICANT CHANGE UP (ref 68–114)
GLUCOSE SERPL-MCNC: 107 MG/DL — HIGH (ref 70–99)
GLUCOSE SERPL-MCNC: 108 MG/DL — HIGH (ref 70–99)
GLUCOSE SERPL-MCNC: 118 MG/DL — HIGH (ref 70–99)
GLUCOSE SERPL-MCNC: 127 MG/DL — HIGH (ref 70–99)
GLUCOSE SERPL-MCNC: 127 MG/DL — HIGH (ref 70–99)
GLUCOSE SERPL-MCNC: 89 MG/DL — SIGNIFICANT CHANGE UP (ref 70–99)
HCT VFR BLD CALC: 34.3 % — LOW (ref 34.5–45)
HGB BLD-MCNC: 11.9 G/DL — SIGNIFICANT CHANGE UP (ref 11.5–15.5)
IMM GRANULOCYTES NFR BLD AUTO: 0.3 % — SIGNIFICANT CHANGE UP (ref 0–1.5)
LYMPHOCYTES # BLD AUTO: 1.17 K/UL — SIGNIFICANT CHANGE UP (ref 1–3.3)
LYMPHOCYTES # BLD AUTO: 19.3 % — SIGNIFICANT CHANGE UP (ref 13–44)
MAGNESIUM SERPL-MCNC: 1.8 MG/DL — SIGNIFICANT CHANGE UP (ref 1.6–2.6)
MAGNESIUM SERPL-MCNC: 1.9 MG/DL — SIGNIFICANT CHANGE UP (ref 1.6–2.6)
MAGNESIUM SERPL-MCNC: 1.9 MG/DL — SIGNIFICANT CHANGE UP (ref 1.6–2.6)
MCHC RBC-ENTMCNC: 28.8 PG — SIGNIFICANT CHANGE UP (ref 27–34)
MCHC RBC-ENTMCNC: 34.7 GM/DL — SIGNIFICANT CHANGE UP (ref 32–36)
MCV RBC AUTO: 83.1 FL — SIGNIFICANT CHANGE UP (ref 80–100)
MONOCYTES # BLD AUTO: 0.52 K/UL — SIGNIFICANT CHANGE UP (ref 0–0.9)
MONOCYTES NFR BLD AUTO: 8.6 % — SIGNIFICANT CHANGE UP (ref 2–14)
NEUTROPHILS # BLD AUTO: 4.29 K/UL — SIGNIFICANT CHANGE UP (ref 1.8–7.4)
NEUTROPHILS NFR BLD AUTO: 70.6 % — SIGNIFICANT CHANGE UP (ref 43–77)
NRBC # BLD: 0 /100 WBCS — SIGNIFICANT CHANGE UP (ref 0–0)
OSMOLALITY UR: 358 MOS/KG — SIGNIFICANT CHANGE UP (ref 50–1200)
OSMOLALITY UR: 372 MOS/KG — SIGNIFICANT CHANGE UP (ref 50–1200)
OSMOLALITY UR: 373 MOS/KG — SIGNIFICANT CHANGE UP (ref 50–1200)
OSMOLALITY UR: 389 MOS/KG — SIGNIFICANT CHANGE UP (ref 50–1200)
PHOSPHATE SERPL-MCNC: 2 MG/DL — LOW (ref 2.5–4.5)
PHOSPHATE SERPL-MCNC: 2.1 MG/DL — LOW (ref 2.5–4.5)
PHOSPHATE SERPL-MCNC: 2.9 MG/DL — SIGNIFICANT CHANGE UP (ref 2.5–4.5)
PLATELET # BLD AUTO: 199 K/UL — SIGNIFICANT CHANGE UP (ref 150–400)
POTASSIUM SERPL-MCNC: 3.9 MMOL/L — SIGNIFICANT CHANGE UP (ref 3.5–5.3)
POTASSIUM SERPL-MCNC: 3.9 MMOL/L — SIGNIFICANT CHANGE UP (ref 3.5–5.3)
POTASSIUM SERPL-MCNC: 4 MMOL/L — SIGNIFICANT CHANGE UP (ref 3.5–5.3)
POTASSIUM SERPL-MCNC: 4.1 MMOL/L — SIGNIFICANT CHANGE UP (ref 3.5–5.3)
POTASSIUM SERPL-SCNC: 3.9 MMOL/L — SIGNIFICANT CHANGE UP (ref 3.5–5.3)
POTASSIUM SERPL-SCNC: 3.9 MMOL/L — SIGNIFICANT CHANGE UP (ref 3.5–5.3)
POTASSIUM SERPL-SCNC: 4 MMOL/L — SIGNIFICANT CHANGE UP (ref 3.5–5.3)
POTASSIUM SERPL-SCNC: 4.1 MMOL/L — SIGNIFICANT CHANGE UP (ref 3.5–5.3)
POTASSIUM UR-SCNC: 41 MMOL/L — SIGNIFICANT CHANGE UP
POTASSIUM UR-SCNC: 58 MMOL/L — SIGNIFICANT CHANGE UP
PROCALCITONIN SERPL-MCNC: 0.03 NG/ML — SIGNIFICANT CHANGE UP (ref 0.02–0.1)
PROT ?TM UR-MCNC: 24 MG/DL — HIGH (ref 0–12)
PROT SERPL-MCNC: 6 G/DL — SIGNIFICANT CHANGE UP (ref 6–8.3)
RBC # BLD: 4.13 M/UL — SIGNIFICANT CHANGE UP (ref 3.8–5.2)
RBC # FLD: 12.9 % — SIGNIFICANT CHANGE UP (ref 10.3–14.5)
SARS-COV-2 RNA SPEC QL NAA+PROBE: SIGNIFICANT CHANGE UP
SARS-COV-2 RNA SPEC QL NAA+PROBE: SIGNIFICANT CHANGE UP
SODIUM SERPL-SCNC: 116 MMOL/L — CRITICAL LOW (ref 135–145)
SODIUM SERPL-SCNC: 118 MMOL/L — CRITICAL LOW (ref 135–145)
SODIUM SERPL-SCNC: 118 MMOL/L — CRITICAL LOW (ref 135–145)
SODIUM SERPL-SCNC: 119 MMOL/L — CRITICAL LOW (ref 135–145)
SODIUM SERPL-SCNC: 119 MMOL/L — CRITICAL LOW (ref 135–145)
SODIUM SERPL-SCNC: 122 MMOL/L — LOW (ref 135–145)
SODIUM UR-SCNC: 64 MMOL/L — SIGNIFICANT CHANGE UP
SODIUM UR-SCNC: 66 MMOL/L — SIGNIFICANT CHANGE UP
SODIUM UR-SCNC: 87 MMOL/L — SIGNIFICANT CHANGE UP
SODIUM UR-SCNC: 87 MMOL/L — SIGNIFICANT CHANGE UP
TSH SERPL-MCNC: 2.48 UU/ML — SIGNIFICANT CHANGE UP (ref 0.34–4.82)
WBC # BLD: 6.07 K/UL — SIGNIFICANT CHANGE UP (ref 3.8–10.5)
WBC # FLD AUTO: 6.07 K/UL — SIGNIFICANT CHANGE UP (ref 3.8–10.5)

## 2020-06-14 RX ORDER — SODIUM CHLORIDE 9 MG/ML
1000 INJECTION INTRAMUSCULAR; INTRAVENOUS; SUBCUTANEOUS
Refills: 0 | Status: DISCONTINUED | OUTPATIENT
Start: 2020-06-14 | End: 2020-06-14

## 2020-06-14 RX ORDER — LEVOTHYROXINE SODIUM 125 MCG
1 TABLET ORAL
Qty: 0 | Refills: 0 | DISCHARGE

## 2020-06-14 RX ORDER — POTASSIUM PHOSPHATE, MONOBASIC POTASSIUM PHOSPHATE, DIBASIC 236; 224 MG/ML; MG/ML
15 INJECTION, SOLUTION INTRAVENOUS ONCE
Refills: 0 | Status: COMPLETED | OUTPATIENT
Start: 2020-06-14 | End: 2020-06-14

## 2020-06-14 RX ORDER — NEBIVOLOL HYDROCHLORIDE 5 MG/1
1 TABLET ORAL
Qty: 0 | Refills: 0 | DISCHARGE

## 2020-06-14 RX ORDER — SODIUM,POTASSIUM PHOSPHATES 278-250MG
1 POWDER IN PACKET (EA) ORAL
Refills: 0 | Status: DISCONTINUED | OUTPATIENT
Start: 2020-06-14 | End: 2020-06-14

## 2020-06-14 RX ORDER — SODIUM CHLORIDE 9 MG/ML
1000 INJECTION, SOLUTION INTRAVENOUS
Refills: 0 | Status: DISCONTINUED | OUTPATIENT
Start: 2020-06-14 | End: 2020-06-14

## 2020-06-14 RX ORDER — SODIUM CHLORIDE 9 MG/ML
1000 INJECTION INTRAMUSCULAR; INTRAVENOUS; SUBCUTANEOUS
Refills: 0 | Status: DISCONTINUED | OUTPATIENT
Start: 2020-06-14 | End: 2020-06-15

## 2020-06-14 RX ADMIN — CEFTRIAXONE 100 MILLIGRAM(S): 500 INJECTION, POWDER, FOR SOLUTION INTRAMUSCULAR; INTRAVENOUS at 08:47

## 2020-06-14 RX ADMIN — PANTOPRAZOLE SODIUM 40 MILLIGRAM(S): 20 TABLET, DELAYED RELEASE ORAL at 07:56

## 2020-06-14 RX ADMIN — SODIUM CHLORIDE 100 MILLILITER(S): 9 INJECTION, SOLUTION INTRAVENOUS at 07:56

## 2020-06-14 RX ADMIN — ENOXAPARIN SODIUM 40 MILLIGRAM(S): 100 INJECTION SUBCUTANEOUS at 11:03

## 2020-06-14 RX ADMIN — Medication 75 MICROGRAM(S): at 08:47

## 2020-06-14 RX ADMIN — POTASSIUM PHOSPHATE, MONOBASIC POTASSIUM PHOSPHATE, DIBASIC 62.5 MILLIMOLE(S): 236; 224 INJECTION, SOLUTION INTRAVENOUS at 05:55

## 2020-06-14 RX ADMIN — SODIUM CHLORIDE 50 MILLILITER(S): 9 INJECTION INTRAMUSCULAR; INTRAVENOUS; SUBCUTANEOUS at 10:51

## 2020-06-14 RX ADMIN — CHLORHEXIDINE GLUCONATE 1 APPLICATION(S): 213 SOLUTION TOPICAL at 11:03

## 2020-06-14 RX ADMIN — AZITHROMYCIN 255 MILLIGRAM(S): 500 TABLET, FILM COATED ORAL at 08:47

## 2020-06-14 NOTE — CONSULT NOTE ADULT - SUBJECTIVE AND OBJECTIVE BOX
Chief complain/HPI  84 yo F from home, AAOX3, ambulates with cane with PMH of HTN and hypothyroidism BIBA to ED c/o feeling fatigue and generalized weakness for past 3 weeks, MADDEN and decreased appetite. Pt checked her B/P, was high 190/80.  Pt denies fever, CP, edema, palpitations, coughing, dizziness, orthopnea, abd pain, n/v/c/d. Last BM this am.     ED course, vitals noted temp 97, HR 70, /95, RR 18, SO2 97% on RA. Labs noted Na 113,  CXR noted b/l lower lobes infiltrates. s/p zithromax and rocephin in ED.     GOC: DNR/DNI/NO invasive procedure. HCP Nicole Ingram [phone].    Renal consult was called for hyponatremia , initial sodium 113 with 0.8reatinine  Urine lytes Osmolality 358 and sodium   Home meds: Losartan HCTZ 12.5 MG DAILY, Synthyorid.    INTERPRETATION:  AP semierect chest on 2020 2:39 PM. Patient is short of breath. Patient has dyspnea on exertion and mild chills. Patient hasdiminished appetite and edema. Patient has hypertension. Patient is anemic.    COMPARISON: None available.    Heart is magnified by technique. The aorta is somewhat elongated.    Question is raised of rather small infiltrate off both lower sergey left greater than right.    IMPRESSION: Probable small infiltrates off both lower sergey left greater than right.        sYNTHYROID  PAST MEDICAL & SURGICAL HISTORY:  HTN (hypertension)  S/P  section      Home Medications Reviewed    Hospital Medications:   MEDICATIONS  (STANDING):  azithromycin  IVPB 500 milliGRAM(s) IV Intermittent every 24 hours  cefTRIAXone   IVPB 1000 milliGRAM(s) IV Intermittent every 24 hours  chlorhexidine 4% Liquid 1 Application(s) Topical daily  dextrose 5%. 1000 milliLiter(s) (100 mL/Hr) IV Continuous <Continuous>  enoxaparin Injectable 40 milliGRAM(s) SubCutaneous daily  levothyroxine 50 MICROGram(s) Oral <User Schedule>  levothyroxine 75 MICROGram(s) Oral <User Schedule>  pantoprazole    Tablet 40 milliGRAM(s) Oral before breakfast  potassium acid phosphate/sodium acid phosphate tablet (K-PHOS No. 2) 1 Tablet(s) Oral four times a day with meals    MEDICATIONS  (PRN):      Allergies    No Known Allergies    Intolerances                              11.9   6.07  )-----------( 199      ( 2020 04:35 )             34.3         118<LL>  |  80<L>  |  13  ----------------------------<  118<H>  4.1   |  29  |  0.55    Ca    8.0<L>      2020 08:21  Phos  2.9       Mg     1.9         TPro  6.0  /  Alb  3.0<L>  /  TBili  0.6  /  DBili  x   /  AST  31  /  ALT  37  /  AlkPhos  64  14    PT/INR - ( 2020 14:13 )   PT: 10.8 sec;   INR: 0.96 ratio         PTT - ( 2020 14:13 )  PTT:30.6 sec  Urinalysis Basic - ( 2020 20:23 )    Color: Yellow / Appearance: Clear / S.005 / pH: x  Gluc: x / Ketone: Negative  / Bili: Negative / Urobili: Negative   Blood: x / Protein: Negative / Nitrite: Negative   Leuk Esterase: Negative / RBC: 0-2 /HPF / WBC 0-2 /HPF   Sq Epi: x / Non Sq Epi: Occasional /HPF / Bacteria: Trace /HPF      Sodium, Random Urine: 87 mmol/L ( @ 08:40)  Potassium, Random Urine: 41 mmol/L ( @ 08:40)  Sodium, Random Urine: 66 mmol/L ( @ 01:17)  Potassium, Random Urine: 58 mmol/L ( @ 01:17)  Osmolality, Random Urine: 373 mos/kg ( @ 01:17)  Sodium, Random Urine: 19 mmol/L ( @ 20:23)  Osmolality, Random Urine: 109 mos/kg ( @ 20:23)  Potassium, Random Urine: 8 mmol/L ( @ 20:23)  Potassium, Random Urine: 7 mmol/L ( @ 17:33)  Osmolality, Random Urine: 110 mos/kg ( @ 17:33)  Sodium, Random Urine: 17 mmol/L ( @ 17:27)  Creatinine, Random Urine: <13 mg/dL ( @ 17:27)  Chloride, Random Urine: 13 mmol/L ( @ 17:27)        RADIOLOGY & ADDITIONAL STUDIES:    SOCIAL HISTORY: Denies ETOh,Smoking,     FAMILY HISTORY:      REVIEW OF SYSTEMS:  CONSTITUTIONAL: No malaise, No fatigue, No fevers or chills, well developed, no diaphoresis  EYES/ENT: No visual changes;  No vertigo or throat pain   NECK: No pain or stiffness  RESPIRATORY: No cough, wheezing, hemoptysis; No shortness of breath  CARDIOVASCULAR: No chest pain or palpitations. No edema  GASTROINTESTINAL: No abdominal or epigastric pain. No nausea, vomiting, or hematemesis; No diarrhea or constipation. No melena or hematochezia.  GENITOURINARY: No dysuria, frequency, foamy urine, urinary urgency, incontinence or hematuria  NEUROLOGICAL: No numbness or weakness, No tremor  SKIN: No itching, burning, rashes, or lesions   VASCULAR: No claudication  Musculoskeletal: no arthralgia, no myalgia  All other review of systems is negative unless indicated above.    VITALS:  Vital Signs Last 24 Hrs  T(C): 36 (2020 08:00), Max: 37.1 (2020 19:41)  T(F): 96.8 (2020 08:00), Max: 98.7 (2020 19:41)  HR: 57 (2020 09:00) (49 - 71)  BP: 153/72 (2020 09:00) (118/56 - 174/95)  BP(mean): 91 (2020 09:00) (72 - 107)  RR: 15 (2020 09:00) (12 - 18)  SpO2: 100% (2020 09:00) (97% - 100%)     @ 07:  -   @ 07:00  --------------------------------------------------------  IN: 1400 mL / OUT: 1655 mL / NET: -255 mL     @ 07:01  -   @ 09:18  --------------------------------------------------------  IN: 650 mL / OUT: 125 mL / NET: 525 mL        Weight (kg): 50.8 ( @ 13:38)    PHYSICAL EXAM:  Constitutional: NAD  HEENT: anicteric sclera, oropharynx clear, MMM  Neck: No JVD  Respiratory: good air entrance B/L, no wheezes, rales or rhonchi  Cardiovascular: S1, S2, RRR, no pericardial rub, no murmur  Gastrointestinal: BS+, soft, no tenderness, no distension, no bruit  Pelvis: bladder non-distended, no CVA tenderness  Extremities: No cyanosis or clubbing. No peripheral edema  Neurological: A/O x 3, no focal deficits  Psychiatric: Normal mood, normal affect  : No CVA tenderness. No walters.   Skin: No rashes  Vascular: all pulses present  Access: Chief complain/HPI  86 yo F from home, AAOX3, ambulates with cane with PMH of HTN and hypothyroidism BIBA to ED c/o feeling fatigue and generalized weakness for past 3 weeks, MADDEN and decreased appetite. Pt checked her B/P, was high 190/80.  Pt denies fever, CP, edema, palpitations, coughing, dizziness, orthopnea, abd pain, n/v/c/d. Last BM this am.     ED course, vitals noted temp 97, HR 70, /95, RR 18, SO2 97% on RA. Labs noted Na 113,  CXR noted b/l lower lobes infiltrates. s/p zithromax and rocephin in ED.     GOC: DNR/DNI/NO invasive procedure. HCP Nicole Ingram [phone].    Renal consult was called for hyponatremia , initial sodium 113 with 0.8reatinine. Initial urine sodium was 17 and urine osmolality 110 - 109. Atter  bolus of NS her Sodium increased from 113 to 122 in less than 12 hours. She received 1 mcg  Desmopressin and with that her sodiunm reduced to 116 and urinary sodium excretion increased .    Urine lytes Osmolality 358 and sodium   Home meds: Losartan HCTZ 12.5 MG DAILY, Synthyorid.    xr chest no CHF.    sYNTHYROID  PAST MEDICAL & SURGICAL HISTORY:  HTN (hypertension)  S/P  section      Home Medications Reviewed    Hospital Medications:   MEDICATIONS  (STANDING):  azithromycin  IVPB 500 milliGRAM(s) IV Intermittent every 24 hours  cefTRIAXone   IVPB 1000 milliGRAM(s) IV Intermittent every 24 hours  chlorhexidine 4% Liquid 1 Application(s) Topical daily  dextrose 5%. 1000 milliLiter(s) (100 mL/Hr) IV Continuous <Continuous>  enoxaparin Injectable 40 milliGRAM(s) SubCutaneous daily  levothyroxine 50 MICROGram(s) Oral <User Schedule>  levothyroxine 75 MICROGram(s) Oral <User Schedule>  pantoprazole    Tablet 40 milliGRAM(s) Oral before breakfast  potassium acid phosphate/sodium acid phosphate tablet (K-PHOS No. 2) 1 Tablet(s) Oral four times a day with meals    MEDICATIONS  (PRN):      Allergies    No Known Allergies    Intolerances      Thyroid Stimulating Hormone, Serum: 2.48 uU/mL (20 @ 04:35)      Sodium, Serum: 116: TYPE:(C=Critical, N=Notification, A=Abnormal) C  Creatinine, Serum: 0.60 mg/dL (.20 @ 13:24)                          11.9   6.07  )-----------( 199      ( 2020 04:35 )             34.3     14    118<LL>  |  80<L>  |  13  ----------------------------<  118<H>  4.1   |  29  |  0.55    Ca    8.0<L>      2020 08:21  Phos  2.9       Mg     1.9         TPro  6.0  /  Alb  3.0<L>  /  TBili  0.6  /  DBili  x   /  AST  31  /  ALT  37  /  AlkPhos  64  14    PT/INR - ( 2020 14:13 )   PT: 10.8 sec;   INR: 0.96 ratio         PTT - ( 2020 14:13 )  PTT:30.6 sec  Urinalysis Basic - ( 2020 20:23 )    Color: Yellow / Appearance: Clear / S.005 / pH: x  Gluc: x / Ketone: Negative  / Bili: Negative / Urobili: Negative   Blood: x / Protein: Negative / Nitrite: Negative   Leuk Esterase: Negative / RBC: 0-2 /HPF / WBC 0-2 /HPF   Sq Epi: x / Non Sq Epi: Occasional /HPF / Bacteria: Trace /HPF      Sodium, Random Urine: 87 mmol/L ( @ 08:40)  Potassium, Random Urine: 41 mmol/L ( @ 08:40)  Sodium, Random Urine: 66 mmol/L ( @ 01:17)  Potassium, Random Urine: 58 mmol/L ( @ 01:17)  Osmolality, Random Urine: 373 mos/kg ( @ 01:17)  Sodium, Random Urine: 19 mmol/L ( @ 20:23)  Osmolality, Random Urine: 109 mos/kg ( @ 20:23)  Potassium, Random Urine: 8 mmol/L ( @ 20:23)  Potassium, Random Urine: 7 mmol/L ( @ 17:33)  Osmolality, Random Urine: 110 mos/kg ( @ 17:33)  Sodium, Random Urine: 17 mmol/L ( @ 17:27)  Creatinine, Random Urine: <13 mg/dL ( @ 17:27)  Chloride, Random Urine: 13 mmol/L ( 17:27)        RADIOLOGY & ADDITIONAL STUDIES:    SOCIAL HISTORY: Denies ETOh,Smoking,     FAMILY HISTORY:      REVIEW OF SYSTEMS:  CONSTITUTIONAL: No malaise, No fatigue,NECK: No pain or stiffness  RESPIRATORY: No cough, wheezing, hemoptysis; No shortness of breath  CARDIOVASCULAR: No chest pain or palpitations. No edema  GASTROINTESTINAL: AS PER THE HISTORY REDUCED APPETITE . She says that she eats  GENITOURINARY: No dysuria.        VITALS:  Vital Signs Last 24 Hrs  T(C): 36 (2020 08:00), Max: 37.1 (2020 19:41)  T(F): 96.8 (2020 08:00), Max: 98.7 (2020 19:41)  HR: 57 (2020 09:00) (49 - 71)  BP: 153/72 (2020 09:00) (118/56 - 174/95)  BP(mean): 91 (2020 09:00) (72 - 107)  RR: 15 (2020 09:00) (12 - 18)  SpO2: 100% (2020 09:00) (97% - 100%)     @ 07:  -  14 @ 07:00  --------------------------------------------------------  IN: 1400 mL / OUT: 1655 mL / NET: -255 mL     @ 07:  -   @ 09:18  --------------------------------------------------------  IN: 650 mL / OUT: 125 mL / NET: 525 mL        Weight (kg): 50.8 ( @ 13:38)    PHYSICAL EXAM:  Constitutional: NAD  HEENT: anicteric sclera, oropharynx clear, MMM  Neck: No JVD  Respiratory: good air entrance B/L, no wheezes, rales or rhonchi  Cardiovascular: S1, S2, RRR, no pericardial rub, no murmur  Gastrointestinal: BS+, soft, no tenderness, no distension, no bruit  Pelvis: bladder non-distended, no CVA tenderness  Extremities: No cyanosis or clubbing. No peripheral edema  Neurological: A/O x 3, no focal deficits  reduced muscle mass

## 2020-06-14 NOTE — PROGRESS NOTE ADULT - ASSESSMENT
86 yo F from home, AAOX3, ambulates with cane with PMH of HTN and hypothyroidism BIBA to ED c/o feeling fatigue and generalized weakness for past 3 weeks, MADDEN and decreased appetite. Found Na 113, and CXR noted b/l lower lobes infiltrates. Admit to ICU for Hyponatremia and PNA       Problems:   -Hyponatremia   -PNA, r/o COVID   -HTN  -Hypothyroidism     Plan:  Neuro:   AAOX3, no issue     Respi:   Saturation 97% on RA , comfortable, no sob/work of breath     CVS:   HTN  -hold HCTZ, resume losartan and bystolic     GI:   Dash/fluid restriction diet    Heme:   no issue     Endo:   Hypothyroidism  -c/w synthroid home dose   -f/u TSH  keep FS   f/u Hb A1c    ID:  PNA  -CXR noted b/l lower lobes infiltrates.   -c/w zithromax and rocephin   -f/u BCx and r/o COVID       Renal:   Hyponatremia 2/2 diuretics vs SIADH   -Na 113  -will give NS 500cc  -f/u urine lytes, urine osmolarity and serum osmolarity   -BMP q4hrs and Urine Na/K/Osmo q4hrs   -Nephro consulted Dr. Gupta     PPx: on dvt and gi ppx    GOC: DNR/DNI/no invasive procedure. 84 yo F from home, AAOX3, ambulates with cane with PMH of HTN and hypothyroidism BIBA to ED c/o feeling fatigue and generalized weakness for past 3 weeks, MADDEN and decreased appetite. Found Na 113, and CXR noted b/l lower lobes infiltrates. Admit to ICU for Hyponatremia and PNA       Problems:   -Hyponatremia   -PNA, r/o COVID   -HTN  -Hypothyroidism     Plan:  Neuro:   AAOX3, no issue     Respi:   Saturation 97% on RA , comfortable, no sob/work of breath     CVS:   HTN  -hold HCTZ, resume losartan and bystolic     GI:   Dash/fluid restriction diet    Heme:   no issue     Endo:   Hypothyroidism  -c/w synthroid home dose   -f/u TSH  keep FS   f/u Hb A1c    ID:  PNA  -CXR noted b/l lower lobes infiltrates.   -c/w zithromax and rocephin   -f/u BCx and r/o COVID       Renal:   Hyponatremia 2/2 diuretics vs SIADH   -Na 113  -s/p NS 500cc  Na improving 113>116>121/122  s/p DDAVP and D5 overnight  -BMP q4hrs and Urine Na/K/Osmo q4hrs   -Nephro consulted Dr. Gupta     PPx: on dvt and gi ppx    GOC: DNR/DNI/no invasive procedure.

## 2020-06-14 NOTE — PROGRESS NOTE ADULT - SUBJECTIVE AND OBJECTIVE BOX
INTERVAL HPI/OVERNIGHT EVENTS: ***    PRESSORS: [ ] YES [ ] NO  WHICH:    ANTIBIOTICS:                  DATE STARTED:  ANTIBIOTICS:                  DATE STARTED:  ANTIBIOTICS:                  DATE STARTED:    Antimicrobial:  azithromycin  IVPB 500 milliGRAM(s) IV Intermittent every 24 hours  cefTRIAXone   IVPB 1000 milliGRAM(s) IV Intermittent every 24 hours    Cardiovascular:    Pulmonary:    Hematalogic:  enoxaparin Injectable 40 milliGRAM(s) SubCutaneous daily    Other:  chlorhexidine 4% Liquid 1 Application(s) Topical daily  dextrose 5%. 1000 milliLiter(s) IV Continuous <Continuous>  levothyroxine 50 MICROGram(s) Oral <User Schedule>  levothyroxine 75 MICROGram(s) Oral <User Schedule>  pantoprazole    Tablet 40 milliGRAM(s) Oral before breakfast    azithromycin  IVPB 500 milliGRAM(s) IV Intermittent every 24 hours  cefTRIAXone   IVPB 1000 milliGRAM(s) IV Intermittent every 24 hours  chlorhexidine 4% Liquid 1 Application(s) Topical daily  dextrose 5%. 1000 milliLiter(s) IV Continuous <Continuous>  enoxaparin Injectable 40 milliGRAM(s) SubCutaneous daily  levothyroxine 50 MICROGram(s) Oral <User Schedule>  levothyroxine 75 MICROGram(s) Oral <User Schedule>  pantoprazole    Tablet 40 milliGRAM(s) Oral before breakfast    Drug Dosing Weight    Weight (kg): 50.8 (2020 13:38)    CENTRAL LINE: [ ] YES [ ] NO  LOCATION:   DATE INSERTED:  REMOVE: [ ] YES [ ] NO  EXPLAIN:    HO: [ ] YES [ ] NO    DATE INSERTED:  REMOVE:  [ ] YES [ ] NO  EXPLAIN:    A-LINE:  [ ] YES [ ] NO  LOCATION:   DATE INSERTED:  REMOVE:  [ ] YES [ ] NO  EXPLAIN:    PMH -reviewed admission note, no change since admission  PAST MEDICAL & SURGICAL HISTORY:  HTN (hypertension)  S/P  section      ICU Vital Signs Last 24 Hrs  T(C): 36.7 (2020 21:00), Max: 37.1 (2020 19:41)  T(F): 98 (2020 21:00), Max: 98.7 (2020 19:41)  HR: 61 (2020 01:00) (56 - 71)  BP: 147/56 (2020 01:00) (129/101 - 174/95)  BP(mean): 78 (2020 01:00) (78 - 107)  ABP: --  ABP(mean): --  RR: 14 (2020 01:00) (14 - 18)  SpO2: 100% (2020 01:00) (97% - 100%)                    PHYSICAL EXAM:    GENERAL: [ ]NAD, [ ]well-groomed, [ ]well-developed  HEAD:  [ ]Atraumatic, [ ]Normocephalic  EYES: [ ]EOMI, [ ]PERRLA, [ ]conjunctiva and sclera clear  ENMT: [ ]No tonsillar erythema, exudates, or enlargement; [ ]Moist mucous membranes, [ ]Good dentition, [ ]No lesions  NECK: [ ]Supple, normal appearance, [ ]No JVD; [ ]Normal thyroid; [ ]Trachea midline  NERVOUS SYSTEM:  [ ]Alert & Oriented X3, [ ]Good concentration; [ ]Motor Strength 5/5 B/L upper and lower extremities; [ ]DTRs 2+ intact and symmetric  CHEST/LUNG: [ ]No chest deformity; [ ]Normal percussion bilaterally; [ ]No rales, rhonchi, wheezing   HEART: [ ]Regular rate and rhythm; [ ]No murmurs, rubs, or gallops  ABDOMEN: [ ]Soft, Nontender, Nondistended; [ ]Bowel sounds present  EXTREMITIES:  [ ]2+ Peripheral Pulses, [ ]No clubbing, cyanosis, or edema  LYMPH: [ ]No lymphadenopathy noted  SKIN: [ ]No rashes or lesions; [ ]Good capillary refill      LABS:  CBC Full  -  ( 2020 14:13 )  WBC Count : 7.06 K/uL  RBC Count : 4.03 M/uL  Hemoglobin : 11.6 g/dL  Hematocrit : 32.9 %  Platelet Count - Automated : 232 K/uL  Mean Cell Volume : 81.6 fl  Mean Cell Hemoglobin : 28.8 pg  Mean Cell Hemoglobin Concentration : 35.3 gm/dL  Auto Neutrophil # : 5.10 K/uL  Auto Lymphocyte # : 1.26 K/uL  Auto Monocyte # : 0.61 K/uL  Auto Eosinophil # : 0.03 K/uL  Auto Basophil # : 0.04 K/uL  Auto Neutrophil % : 72.3 %  Auto Lymphocyte % : 17.8 %  Auto Monocyte % : 8.6 %  Auto Eosinophil % : 0.4 %  Auto Basophil % : 0.6 %    -    121<L>  |  80<L>  |  16  ----------------------------<  105<H>  3.9   |  30  |  0.76    Ca    8.3<L>      2020 20:08  Mg     2.0     06-13    TPro  7.1  /  Alb  3.5  /  TBili  0.8  /  DBili  x   /  AST  36  /  ALT  44  /  AlkPhos  94  06-13    PT/INR - ( 2020 14:13 )   PT: 10.8 sec;   INR: 0.96 ratio         PTT - ( 2020 14:13 )  PTT:30.6 sec  Urinalysis Basic - ( 2020 20:23 )    Color: Yellow / Appearance: Clear / S.005 / pH: x  Gluc: x / Ketone: Negative  / Bili: Negative / Urobili: Negative   Blood: x / Protein: Negative / Nitrite: Negative   Leuk Esterase: Negative / RBC: 0-2 /HPF / WBC 0-2 /HPF   Sq Epi: x / Non Sq Epi: Occasional /HPF / Bacteria: Trace /HPF          RADIOLOGY & ADDITIONAL STUDIES REVIEWED:  ***    [ ]GOALS OF CARE DISCUSSION WITH PATIENT/FAMILY/PROXY:    CRITICAL CARE TIME SPENT: 35 minutes INTERVAL HPI/OVERNIGHT EVENTS:     Na improving 113>116>121/122  s/p DDAVP and D5 overnight        PRESSORS: [ ] YES [X] NO  WHICH:    ANTIBIOTICS:                  DATE STARTED:  ANTIBIOTICS:                  DATE STARTED:  ANTIBIOTICS:                  DATE STARTED:    Antimicrobial:  azithromycin  IVPB 500 milliGRAM(s) IV Intermittent every 24 hours  cefTRIAXone   IVPB 1000 milliGRAM(s) IV Intermittent every 24 hours    Cardiovascular:    Pulmonary:    Hematalogic:  enoxaparin Injectable 40 milliGRAM(s) SubCutaneous daily    Other:  chlorhexidine 4% Liquid 1 Application(s) Topical daily  dextrose 5%. 1000 milliLiter(s) IV Continuous <Continuous>  levothyroxine 50 MICROGram(s) Oral <User Schedule>  levothyroxine 75 MICROGram(s) Oral <User Schedule>  pantoprazole    Tablet 40 milliGRAM(s) Oral before breakfast    azithromycin  IVPB 500 milliGRAM(s) IV Intermittent every 24 hours  cefTRIAXone   IVPB 1000 milliGRAM(s) IV Intermittent every 24 hours  chlorhexidine 4% Liquid 1 Application(s) Topical daily  dextrose 5%. 1000 milliLiter(s) IV Continuous <Continuous>  enoxaparin Injectable 40 milliGRAM(s) SubCutaneous daily  levothyroxine 50 MICROGram(s) Oral <User Schedule>  levothyroxine 75 MICROGram(s) Oral <User Schedule>  pantoprazole    Tablet 40 milliGRAM(s) Oral before breakfast    Drug Dosing Weight    Weight (kg): 50.8 (2020 13:38)    CENTRAL LINE: [ ] YES [X] NO  LOCATION:   DATE INSERTED:  REMOVE: [ ] YES [ ] NO  EXPLAIN:    HO: [X] YES [ ] NO    DATE INSERTED:  REMOVE:  [ ] YES [ ] NO  EXPLAIN:    A-LINE:  [ ] YES [X] NO  LOCATION:   DATE INSERTED:  REMOVE:  [ ] YES [ ] NO  EXPLAIN:    PMH -reviewed admission note, no change since admission  PAST MEDICAL & SURGICAL HISTORY:  HTN (hypertension)  S/P  section      ICU Vital Signs Last 24 Hrs  T(C): 36.7 (2020 21:00), Max: 37.1 (2020 19:41)  T(F): 98 (2020 21:00), Max: 98.7 (2020 19:41)  HR: 61 (2020 01:00) (56 - 71)  BP: 147/56 (2020 01:00) (129/101 - 174/95)  BP(mean): 78 (2020 01:00) (78 - 107)  ABP: --  ABP(mean): --  RR: 14 (2020 01:00) (14 - 18)  SpO2: 100% (2020 01:00) (97% - 100%)                    PHYSICAL EXAM:    · Constitutional	detailed exam  · Constitutional Details	no distress  · Eyes	detailed exam  · Eyes Details	EOMI; conjunctiva clear  · ENMT	detailed exam  · ENMT Details	mouth  · Mouth	moist  · Neck	detailed exam  · Neck Details	supple; no JVD  · Respiratory	detailed exam  · Respiratory Details	breath sounds equal; no rales; no rhonchi; no wheezes  · Cardiovascular	detailed exam  · Cardiovascular Details	regular rate and rhythm  · Cardiovascular Details	positive S1; positive S2  · Extremities	detailed exam  · Extremities Details	no pedal edema  · Neurological	detailed exam  · Neurological Details	alert and oriented x 3; sensation intact; normal strength  · Skin	detailed exam  · Skin Details	color normal  · Musculoskeletal	detailed exam  · Musculoskeletal Details	no joint swelling      LABS:  CBC Full  -  ( 2020 14:13 )  WBC Count : 7.06 K/uL  RBC Count : 4.03 M/uL  Hemoglobin : 11.6 g/dL  Hematocrit : 32.9 %  Platelet Count - Automated : 232 K/uL  Mean Cell Volume : 81.6 fl  Mean Cell Hemoglobin : 28.8 pg  Mean Cell Hemoglobin Concentration : 35.3 gm/dL  Auto Neutrophil # : 5.10 K/uL  Auto Lymphocyte # : 1.26 K/uL  Auto Monocyte # : 0.61 K/uL  Auto Eosinophil # : 0.03 K/uL  Auto Basophil # : 0.04 K/uL  Auto Neutrophil % : 72.3 %  Auto Lymphocyte % : 17.8 %  Auto Monocyte % : 8.6 %  Auto Eosinophil % : 0.4 %  Auto Basophil % : 0.6 %        121<L>  |  80<L>  |  16  ----------------------------<  105<H>  3.9   |  30  |  0.76    Ca    8.3<L>      2020 20:08  Mg     2.0     06-13    TPro  7.1  /  Alb  3.5  /  TBili  0.8  /  DBili  x   /  AST  36  /  ALT  44  /  AlkPhos  94  06-13    PT/INR - ( 2020 14:13 )   PT: 10.8 sec;   INR: 0.96 ratio         PTT - ( 2020 14:13 )  PTT:30.6 sec  Urinalysis Basic - ( 2020 20:23 )    Color: Yellow / Appearance: Clear / S.005 / pH: x  Gluc: x / Ketone: Negative  / Bili: Negative / Urobili: Negative   Blood: x / Protein: Negative / Nitrite: Negative   Leuk Esterase: Negative / RBC: 0-2 /HPF / WBC 0-2 /HPF   Sq Epi: x / Non Sq Epi: Occasional /HPF / Bacteria: Trace /HPF          RADIOLOGY & ADDITIONAL STUDIES REVIEWED: INTERVAL HPI/OVERNIGHT EVENTS:     Na improving 113>116>121/122  s/p DDAVP and D5 overnight  Presently on Ns, repeat BMp at 1 PM   nephro follow up         PRESSORS: [ ] YES [X] NO  WHICH:    ANTIBIOTICS:                  DATE STARTED:  ANTIBIOTICS:                  DATE STARTED:  ANTIBIOTICS:                  DATE STARTED:    Antimicrobial:  azithromycin  IVPB 500 milliGRAM(s) IV Intermittent every 24 hours  cefTRIAXone   IVPB 1000 milliGRAM(s) IV Intermittent every 24 hours    Cardiovascular:    Pulmonary:    Hematalogic:  enoxaparin Injectable 40 milliGRAM(s) SubCutaneous daily    Other:  chlorhexidine 4% Liquid 1 Application(s) Topical daily  dextrose 5%. 1000 milliLiter(s) IV Continuous <Continuous>  levothyroxine 50 MICROGram(s) Oral <User Schedule>  levothyroxine 75 MICROGram(s) Oral <User Schedule>  pantoprazole    Tablet 40 milliGRAM(s) Oral before breakfast    azithromycin  IVPB 500 milliGRAM(s) IV Intermittent every 24 hours  cefTRIAXone   IVPB 1000 milliGRAM(s) IV Intermittent every 24 hours  chlorhexidine 4% Liquid 1 Application(s) Topical daily  dextrose 5%. 1000 milliLiter(s) IV Continuous <Continuous>  enoxaparin Injectable 40 milliGRAM(s) SubCutaneous daily  levothyroxine 50 MICROGram(s) Oral <User Schedule>  levothyroxine 75 MICROGram(s) Oral <User Schedule>  pantoprazole    Tablet 40 milliGRAM(s) Oral before breakfast    Drug Dosing Weight    Weight (kg): 50.8 (2020 13:38)    CENTRAL LINE: [ ] YES [X] NO  LOCATION:   DATE INSERTED:  REMOVE: [ ] YES [ ] NO  EXPLAIN:    HO: [X] YES [ ] NO    DATE INSERTED:  REMOVE:  [ ] YES [ ] NO  EXPLAIN:    A-LINE:  [ ] YES [X] NO  LOCATION:   DATE INSERTED:  REMOVE:  [ ] YES [ ] NO  EXPLAIN:    PMH -reviewed admission note, no change since admission  PAST MEDICAL & SURGICAL HISTORY:  HTN (hypertension)  S/P  section      ICU Vital Signs Last 24 Hrs  T(C): 36.7 (2020 21:00), Max: 37.1 (2020 19:41)  T(F): 98 (2020 21:00), Max: 98.7 (2020 19:41)  HR: 61 (2020 01:00) (56 - 71)  BP: 147/56 (2020 01:00) (129/101 - 174/95)  BP(mean): 78 (2020 01:00) (78 - 107)  ABP: --  ABP(mean): --  RR: 14 (2020 01:00) (14 - 18)  SpO2: 100% (2020 01:00) (97% - 100%)                    PHYSICAL EXAM:    · Constitutional	detailed exam  · Constitutional Details	no distress  · Eyes	detailed exam  · Eyes Details	EOMI; conjunctiva clear  · ENMT	detailed exam  · ENMT Details	mouth  · Mouth	moist  · Neck	detailed exam  · Neck Details	supple; no JVD  · Respiratory	detailed exam  · Respiratory Details	breath sounds equal; no rales; no rhonchi; no wheezes  · Cardiovascular	detailed exam  · Cardiovascular Details	regular rate and rhythm  · Cardiovascular Details	positive S1; positive S2  · Extremities	detailed exam  · Extremities Details	no pedal edema  · Neurological	detailed exam  · Neurological Details	alert and oriented x 3; sensation intact; normal strength  · Skin	detailed exam  · Skin Details	color normal  · Musculoskeletal	detailed exam  · Musculoskeletal Details	no joint swelling      LABS:  CBC Full  -  ( 2020 14:13 )  WBC Count : 7.06 K/uL  RBC Count : 4.03 M/uL  Hemoglobin : 11.6 g/dL  Hematocrit : 32.9 %  Platelet Count - Automated : 232 K/uL  Mean Cell Volume : 81.6 fl  Mean Cell Hemoglobin : 28.8 pg  Mean Cell Hemoglobin Concentration : 35.3 gm/dL  Auto Neutrophil # : 5.10 K/uL  Auto Lymphocyte # : 1.26 K/uL  Auto Monocyte # : 0.61 K/uL  Auto Eosinophil # : 0.03 K/uL  Auto Basophil # : 0.04 K/uL  Auto Neutrophil % : 72.3 %  Auto Lymphocyte % : 17.8 %  Auto Monocyte % : 8.6 %  Auto Eosinophil % : 0.4 %  Auto Basophil % : 0.6 %    -    121<L>  |  80<L>  |  16  ----------------------------<  105<H>  3.9   |  30  |  0.76    Ca    8.3<L>      2020 20:08  Mg     2.0     06-13    TPro  7.1  /  Alb  3.5  /  TBili  0.8  /  DBili  x   /  AST  36  /  ALT  44  /  AlkPhos  94  06-13    PT/INR - ( 2020 14:13 )   PT: 10.8 sec;   INR: 0.96 ratio         PTT - ( 2020 14:13 )  PTT:30.6 sec  Urinalysis Basic - ( 2020 20:23 )    Color: Yellow / Appearance: Clear / S.005 / pH: x  Gluc: x / Ketone: Negative  / Bili: Negative / Urobili: Negative   Blood: x / Protein: Negative / Nitrite: Negative   Leuk Esterase: Negative / RBC: 0-2 /HPF / WBC 0-2 /HPF   Sq Epi: x / Non Sq Epi: Occasional /HPF / Bacteria: Trace /HPF          RADIOLOGY & ADDITIONAL STUDIES REVIEWED:

## 2020-06-14 NOTE — CONSULT NOTE ADULT - ASSESSMENT
Hyponatremia due to reduced po solid fluid intake and increased fluids intake.  Post desmopressin , ADH increased and d nuovo Hyponatremia   Suggest to continue with NS 50 ML per hour till effect of desmopressin fades.  Follow urine lytes K Na  AT 6pm, which can tell us if there is still ADH action on the collecting tubule.

## 2020-06-14 NOTE — CONSULT NOTE ADULT - SUBJECTIVE AND OBJECTIVE BOX
PULMONARY CONSULT NOTE      [facility]  [id number]    Patient is a 85y old  Female who presents with a chief complaint of hyponatremia (2020 09:18)    History of Present Illness:  Reason for Admission: hyponatremia	  History of Present Illness: 	  84 yo F from home, AAOX3, ambulates with cane with PMH of HTN and hypothyroidism BIBA to ED c/o feeling fatigue and generalized weakness for past 3 weeks, MADDEN and decreased appetite. Pt checked her B/P, was high 190/80.  Pt denies fever, CP, edema, palpitations, coughing, dizziness, orthopnea, abd pain, n/v/c/d. Last BM this am.     ED course, vitals noted temp 97, HR 70, /95, RR 18, SO2 97% on RA. Labs noted Na 113,  CXR noted b/l lower lobes infiltrates. s/p zithromax and rocephin in ED.     GOC: DNR/DNI/NO invasive procedure. HCP Niece Nataly [phone].        HISTORY OF PRESENT ILLNESS: As above. Awake, alert, comfortable in bed in NAD. Denies cough but has sob. No wheezing    MEDICATIONS  (STANDING):  azithromycin  IVPB 500 milliGRAM(s) IV Intermittent every 24 hours  cefTRIAXone   IVPB 1000 milliGRAM(s) IV Intermittent every 24 hours  chlorhexidine 4% Liquid 1 Application(s) Topical daily  enoxaparin Injectable 40 milliGRAM(s) SubCutaneous daily  levothyroxine 50 MICROGram(s) Oral <User Schedule>  levothyroxine 75 MICROGram(s) Oral <User Schedule>  pantoprazole    Tablet 40 milliGRAM(s) Oral before breakfast  sodium chloride 0.9%. 1000 milliLiter(s) (50 mL/Hr) IV Continuous <Continuous>      MEDICATIONS  (PRN):      Allergies    No Known Allergies    Intolerances        PAST MEDICAL & SURGICAL HISTORY:  HTN (hypertension)  S/P  section      FAMILY HISTORY:      SOCIAL HISTORY  Smoking History:     REVIEW OF SYSTEMS:    CONSTITUTIONAL:  No fevers, chills, sweats    HEENT:  Eyes:  No diplopia or blurred vision. ENT:  No earache, sore throat or runny nose.    CARDIOVASCULAR:  No pressure, squeezing, tightness, or heaviness about the chest; no palpitations.    RESPIRATORY:  Per HPI    GASTROINTESTINAL:  No abdominal pain, nausea, vomiting or diarrhea.    GENITOURINARY:  No dysuria, frequency or urgency.    NEUROLOGIC:  No paresthesias, fasciculations, seizures or weakness.    PSYCHIATRIC:  No disorder of thought or mood.    Vital Signs Last 24 Hrs  T(C): 36 (2020 11:00), Max: 37.1 (2020 19:41)  T(F): 96.8 (2020 11:00), Max: 98.7 (2020 19:41)  HR: 59 (:) (49 - 71)  BP: 135/75 (:) (118/56 - 174/95)  BP(mean): 86 (:) (72 - 107)  RR: 16 (:) (12 - 18)  SpO2: 100% (2020 11:00) (97% - 100%)  I&O's Detail    2020 07:01  -  2020 07:00  --------------------------------------------------------  IN:    dextrose 5%.: 1100 mL    IV PiggyBack: 250 mL    Oral Fluid: 50 mL  Total IN: 1400 mL    OUT:    Ureteral Catheter: 1655 mL  Total OUT: 1655 mL    Total NET: -255 mL      2020 07:01  -  2020 11:04  --------------------------------------------------------  IN:    dextrose 5%.: 300 mL    IV PiggyBack: 300 mL    Oral Fluid: 50 mL    sodium chloride 0.9%.: 100 mL  Total IN: 750 mL    OUT:    Ureteral Catheter: 310 mL  Total OUT: 310 mL    Total NET: 440 mL          PHYSICAL EXAMINATION:    GENERAL: The patient is a well-developed, well-nourished _____in no apparent distress.     HEENT: Head is normocephalic and atraumatic. Extraocular muscles are intact. Mucous membranes are moist.     NECK: Supple.     LUNGS: Clear to auscultation without wheezing, rales, or rhonchi. Respirations unlabored    HEART: Regular rate and rhythm without murmur.    ABDOMEN: Soft, nontender, and nondistended.  No hepatosplenomegaly is noted.    EXTREMITIES: Without any cyanosis, clubbing, rash, lesions or edema.    NEUROLOGIC: Grossly intact.      LABS:                        11.9   6.07  )-----------( 199      ( 2020 04:35 )             34.3     14    118<LL>  |  80<L>  |  13  ----------------------------<  118<H>  4.1   |  29  |  0.55    Ca    8.0<L>      2020 08:21  Phos  2.9       Mg     1.9     14    TPro  6.0  /  Alb  3.0<L>  /  TBili  0.6  /  DBili  x   /  AST  31  /  ALT  37  /  AlkPhos  64  -14    PT/INR - ( 2020 14:13 )   PT: 10.8 sec;   INR: 0.96 ratio         PTT - ( 2020 14:13 )  PTT:30.6 sec  Urinalysis Basic - ( 2020 20:23 )    Color: Yellow / Appearance: Clear / S.005 / pH: x  Gluc: x / Ketone: Negative  / Bili: Negative / Urobili: Negative   Blood: x / Protein: Negative / Nitrite: Negative   Leuk Esterase: Negative / RBC: 0-2 /HPF / WBC 0-2 /HPF   Sq Epi: x / Non Sq Epi: Occasional /HPF / Bacteria: Trace /HPF        CARDIAC MARKERS ( 2020 14:13 )  0.015 ng/mL / x     / x     / x     / x          D-Dimer Assay, Quantitative: 179 ng/mL DDU (20 @ 16:46)    Serum Pro-Brain Natriuretic Peptide: 505 pg/mL (20 @ 14:13)    Lactate, Blood: 0.8 mmol/L (20 @ 16:46)    Procalcitonin, Serum: 0.03 ng/mL (20 @ 22:01)      MICROBIOLOGY:COVID-19 PCR . (20 @ 16:49)    COVID-19 PCR: NotDetec: This test has been validated by [employer] to be accurate;  though it has not been FDA cleared/approved by the usual pathway.  As with all laboratory tests, results should be correlated with clinical  findings.  https://www.fda.gov/media/641353/download  https://www.fda.gov/media/986406/download        RADIOLOGY & ADDITIONAL STUDIES:    CXR:  < from: Xray Chest 1 View-PORTABLE IMMEDIATE (20 @ 14:56) >  IMPRESSION: Probable small infiltrates off both lower sergey left greater than right.    < end of copied text >    Ct scan chest:    ekg;    echo:

## 2020-06-14 NOTE — CONSULT NOTE ADULT - SUBJECTIVE AND OBJECTIVE BOX
Patient is a 85y old  Female who presents with a chief complaint of hyponatremia (2020 01:09)    pt seen in icu [  ], reg med floor [   ], bed [  ], chair at bedside [   ], a+o x3 [  ], lethargic [  ],  nad [  ]    walters [  ], ngt [  ], peg [  ], et tube [  ], cent line [  ], picc line [  ]        Allergies    No Known Allergies        Vitals    T(F): 98 (20 @ 21:00), Max: 98.7 (20 @ 19:41)  HR: 59 (20 @ 06:00) (49 - 71)  BP: 166/82 (20 @ 06:00) (118/56 - 174/95)  RR: 14 (20 @ 06:00) (14 - 18)  SpO2: 100% (20 @ 06:00) (97% - 100%)  Wt(kg): --  CAPILLARY BLOOD GLUCOSE          Labs                          11.9   6.07  )-----------( 199      ( 2020 04:35 )             34.3       06-14    119<LL>  |  81<L>  |  14  ----------------------------<  127<H>  3.9   |  30  |  0.58    Ca    7.9<L>      2020 04:35  Phos  2.0     06-14  Mg     1.8     06-14    TPro  6.0  /  Alb  3.0<L>  /  TBili  0.6  /  DBili  x   /  AST  31  /  ALT  37  /  AlkPhos  64  06-14      CARDIAC MARKERS ( 2020 14:13 )  0.015 ng/mL / x     / x     / x     / x                Radiology Results      Meds    MEDICATIONS  (STANDING):  azithromycin  IVPB 500 milliGRAM(s) IV Intermittent every 24 hours  cefTRIAXone   IVPB 1000 milliGRAM(s) IV Intermittent every 24 hours  chlorhexidine 4% Liquid 1 Application(s) Topical daily  dextrose 5%. 1000 milliLiter(s) (100 mL/Hr) IV Continuous <Continuous>  enoxaparin Injectable 40 milliGRAM(s) SubCutaneous daily  levothyroxine 50 MICROGram(s) Oral <User Schedule>  levothyroxine 75 MICROGram(s) Oral <User Schedule>  pantoprazole    Tablet 40 milliGRAM(s) Oral before breakfast      MEDICATIONS  (PRN):      Physical Exam    Neuro :  no focal deficits  Respiratory: CTA B/L  CV: RRR, S1S2, no murmurs,   Abdominal: Soft, NT, ND +BS,  Extremities: No edema, + peripheral pulses    ASSESSMENT    Hyponatremia, hypo-osmolarity, or hypo-osmolar hyponatremia  Yes  HTN (hypertension)  S/P  section      PLAN 86 yo F from home, AAOX3, ambulates with cane with PMH of HTN and hypothyroidism BIBA to ED c/o feeling fatigue and generalized weakness for past 3 weeks, MADDEN and decreased appetite. Pt checked her B/P, was high 190/80.  Pt denies fever, CP, edema, palpitations, coughing, dizziness, orthopnea, abd pain, n/v/c/d. Last BM this am.     ED course, vitals noted temp 97, HR 70, /95, RR 18, SO2 97% on RA. Labs noted Na 113,  CXR noted b/l lower lobes infiltrates. s/p zithromax and rocephin in ED.     GOC: DNR/DNI/NO invasive procedure. HCP Nicole Ingram [phone].      Past Medical, Past Surgical, and Family History:  PAST MEDICAL HISTORY:  HTN (hypertension).     PAST SURGICAL HISTORY:  S/P  section.      pt seen in icu [x  ], reg med floor [   ], bed [x  ], chair at bedside [   ], a+o x3 [ x ], lethargic [  ],  nad [x  ]    walters [ x ],          Allergies    No Known Allergies        Vitals    T(F): 98 (20 @ 21:00), Max: 98.7 (20 @ 19:41)  HR: 59 (20 @ 06:00) (49 - 71)  BP: 166/82 (20 @ 06:00) (118/56 - 174/95)  RR: 14 (20 @ 06:00) (14 - 18)  SpO2: 100% (20 @ 06:00) (97% - 100%)  Wt(kg): --  CAPILLARY BLOOD GLUCOSE          Labs                          11.9   6.07  )-----------( 199      ( 2020 04:35 )             34.3       -    119<LL>  |  81<L>  |  14  ----------------------------<  127<H>  3.9   |  30  |  0.58    Ca    7.9<L>      2020 04:35  Phos  2.0     -  Mg     1.8         TPro  6.0  /  Alb  3.0<L>  /  TBili  0.6  /  DBili  x   /  AST  31  /  ALT  37  /  AlkPhos  64        CARDIAC MARKERS ( 2020 14:13 )  0.015 ng/mL / x     / x     / x     / x          COVID-19 PCR . (20 @ 16:49)    COVID-19 PCR: NotDetec: This test has been validated by [employer] to be accurate;  though it has not been FDA cleared/approved by the usual pathway.  As with all laboratory tests, results should be correlated with clinical  findings.  https://www.fda.gov/media/723390/download  https://www.fda.gov/media/583963/download          Radiology Results      < from: Xray Chest 1 View-PORTABLE IMMEDIATE (20 @ 14:56) >  IMPRESSION: Probable small infiltrates off both lower sergey left greater than right.    < end of copied text >              Meds    MEDICATIONS  (STANDING):  azithromycin  IVPB 500 milliGRAM(s) IV Intermittent every 24 hours  cefTRIAXone   IVPB 1000 milliGRAM(s) IV Intermittent every 24 hours  chlorhexidine 4% Liquid 1 Application(s) Topical daily  dextrose 5%. 1000 milliLiter(s) (100 mL/Hr) IV Continuous <Continuous>  enoxaparin Injectable 40 milliGRAM(s) SubCutaneous daily  levothyroxine 50 MICROGram(s) Oral <User Schedule>  levothyroxine 75 MICROGram(s) Oral <User Schedule>  pantoprazole    Tablet 40 milliGRAM(s) Oral before breakfast      MEDICATIONS  (PRN):      Physical Exam    Neuro :  no focal deficits  Respiratory: CTA B/L  CV: RRR, S1S2, no murmurs,   Abdominal: Soft, NT, ND +BS,  Extremities: No edema, + peripheral pulses    ASSESSMENT    Hyponatremia, hypo-osmolarity, or hypo-osmolar hyponatremia   r/o pna  r/o covid-19  h/o HTN (hypertension)  S/P  section      PLAN        hold HCTZ,   cont losartan and bystolic   s/p NS 500cc  s/p DDAVP and D5 overnight  renal cons  serum na improving   cxr with Probable small infiltrates off both lower sergey left greater than right noted above  cont rocephin and zithromax  f/u blood cx  pulm cons.  covid test neg noted above  contact and airborne isolation  cont albuterol inhaler   F/u D-dimer, esr, crp, ferritin, lactate,   cont supportive care with tylenol prn, robitussin prn and O2 via nasal  as needed   pt DNR/DNI/no invasive procedure  cont current meds  mgmt as per icu

## 2020-06-15 DIAGNOSIS — Z51.5 ENCOUNTER FOR PALLIATIVE CARE: ICD-10-CM

## 2020-06-15 DIAGNOSIS — F41.9 ANXIETY DISORDER, UNSPECIFIED: ICD-10-CM

## 2020-06-15 DIAGNOSIS — R53.81 OTHER MALAISE: ICD-10-CM

## 2020-06-15 DIAGNOSIS — J18.9 PNEUMONIA, UNSPECIFIED ORGANISM: ICD-10-CM

## 2020-06-15 LAB
ALBUMIN SERPL ELPH-MCNC: 3.2 G/DL — LOW (ref 3.5–5)
ALP SERPL-CCNC: 68 U/L — SIGNIFICANT CHANGE UP (ref 40–120)
ALT FLD-CCNC: 37 U/L DA — SIGNIFICANT CHANGE UP (ref 10–60)
ANION GAP SERPL CALC-SCNC: 10 MMOL/L — SIGNIFICANT CHANGE UP (ref 5–17)
ANION GAP SERPL CALC-SCNC: 11 MMOL/L — SIGNIFICANT CHANGE UP (ref 5–17)
ANION GAP SERPL CALC-SCNC: 12 MMOL/L — SIGNIFICANT CHANGE UP (ref 5–17)
AST SERPL-CCNC: 32 U/L — SIGNIFICANT CHANGE UP (ref 10–40)
BASOPHILS # BLD AUTO: 0.04 K/UL — SIGNIFICANT CHANGE UP (ref 0–0.2)
BASOPHILS NFR BLD AUTO: 0.7 % — SIGNIFICANT CHANGE UP (ref 0–2)
BILIRUB SERPL-MCNC: 0.8 MG/DL — SIGNIFICANT CHANGE UP (ref 0.2–1.2)
BUN SERPL-MCNC: 8 MG/DL — SIGNIFICANT CHANGE UP (ref 7–18)
BUN SERPL-MCNC: 8 MG/DL — SIGNIFICANT CHANGE UP (ref 7–18)
BUN SERPL-MCNC: 9 MG/DL — SIGNIFICANT CHANGE UP (ref 7–18)
CALCIUM SERPL-MCNC: 7.2 MG/DL — LOW (ref 8.4–10.5)
CALCIUM SERPL-MCNC: 7.3 MG/DL — LOW (ref 8.4–10.5)
CALCIUM SERPL-MCNC: 7.4 MG/DL — LOW (ref 8.4–10.5)
CHLORIDE SERPL-SCNC: 87 MMOL/L — LOW (ref 96–108)
CHLORIDE SERPL-SCNC: 87 MMOL/L — LOW (ref 96–108)
CHLORIDE SERPL-SCNC: 90 MMOL/L — LOW (ref 96–108)
CHLORIDE UR-SCNC: 31 MMOL/L — SIGNIFICANT CHANGE UP
CO2 SERPL-SCNC: 22 MMOL/L — SIGNIFICANT CHANGE UP (ref 22–31)
CO2 SERPL-SCNC: 23 MMOL/L — SIGNIFICANT CHANGE UP (ref 22–31)
CO2 SERPL-SCNC: 24 MMOL/L — SIGNIFICANT CHANGE UP (ref 22–31)
CREAT ?TM UR-MCNC: 17 MG/DL — SIGNIFICANT CHANGE UP
CREAT SERPL-MCNC: 0.5 MG/DL — SIGNIFICANT CHANGE UP (ref 0.5–1.3)
CREAT SERPL-MCNC: 0.56 MG/DL — SIGNIFICANT CHANGE UP (ref 0.5–1.3)
CREAT SERPL-MCNC: 0.56 MG/DL — SIGNIFICANT CHANGE UP (ref 0.5–1.3)
EOSINOPHIL # BLD AUTO: 0.05 K/UL — SIGNIFICANT CHANGE UP (ref 0–0.5)
EOSINOPHIL NFR BLD AUTO: 0.9 % — SIGNIFICANT CHANGE UP (ref 0–6)
GLUCOSE SERPL-MCNC: 73 MG/DL — SIGNIFICANT CHANGE UP (ref 70–99)
GLUCOSE SERPL-MCNC: 93 MG/DL — SIGNIFICANT CHANGE UP (ref 70–99)
GLUCOSE SERPL-MCNC: 96 MG/DL — SIGNIFICANT CHANGE UP (ref 70–99)
HCT VFR BLD CALC: 34 % — LOW (ref 34.5–45)
HGB BLD-MCNC: 12 G/DL — SIGNIFICANT CHANGE UP (ref 11.5–15.5)
IMM GRANULOCYTES NFR BLD AUTO: 0.4 % — SIGNIFICANT CHANGE UP (ref 0–1.5)
LYMPHOCYTES # BLD AUTO: 1.67 K/UL — SIGNIFICANT CHANGE UP (ref 1–3.3)
LYMPHOCYTES # BLD AUTO: 29.3 % — SIGNIFICANT CHANGE UP (ref 13–44)
MAGNESIUM SERPL-MCNC: 1.7 MG/DL — SIGNIFICANT CHANGE UP (ref 1.6–2.6)
MAGNESIUM SERPL-MCNC: 1.7 MG/DL — SIGNIFICANT CHANGE UP (ref 1.6–2.6)
MAGNESIUM SERPL-MCNC: 1.8 MG/DL — SIGNIFICANT CHANGE UP (ref 1.6–2.6)
MCHC RBC-ENTMCNC: 29.1 PG — SIGNIFICANT CHANGE UP (ref 27–34)
MCHC RBC-ENTMCNC: 35.3 GM/DL — SIGNIFICANT CHANGE UP (ref 32–36)
MCV RBC AUTO: 82.3 FL — SIGNIFICANT CHANGE UP (ref 80–100)
MONOCYTES # BLD AUTO: 0.4 K/UL — SIGNIFICANT CHANGE UP (ref 0–0.9)
MONOCYTES NFR BLD AUTO: 7 % — SIGNIFICANT CHANGE UP (ref 2–14)
NEUTROPHILS # BLD AUTO: 3.52 K/UL — SIGNIFICANT CHANGE UP (ref 1.8–7.4)
NEUTROPHILS NFR BLD AUTO: 61.7 % — SIGNIFICANT CHANGE UP (ref 43–77)
NRBC # BLD: 0 /100 WBCS — SIGNIFICANT CHANGE UP (ref 0–0)
OSMOLALITY UR: 158 MOS/KG — SIGNIFICANT CHANGE UP (ref 50–1200)
OSMOLALITY UR: 170 MOS/KG — SIGNIFICANT CHANGE UP (ref 50–1200)
PHOSPHATE SERPL-MCNC: 1.6 MG/DL — LOW (ref 2.5–4.5)
PHOSPHATE SERPL-MCNC: 1.8 MG/DL — LOW (ref 2.5–4.5)
PHOSPHATE SERPL-MCNC: 2.2 MG/DL — LOW (ref 2.5–4.5)
PLATELET # BLD AUTO: 200 K/UL — SIGNIFICANT CHANGE UP (ref 150–400)
POTASSIUM SERPL-MCNC: 3.7 MMOL/L — SIGNIFICANT CHANGE UP (ref 3.5–5.3)
POTASSIUM SERPL-SCNC: 3.7 MMOL/L — SIGNIFICANT CHANGE UP (ref 3.5–5.3)
POTASSIUM UR-SCNC: 13 MMOL/L — SIGNIFICANT CHANGE UP
POTASSIUM UR-SCNC: 13 MMOL/L — SIGNIFICANT CHANGE UP
PROT SERPL-MCNC: 6.4 G/DL — SIGNIFICANT CHANGE UP (ref 6–8.3)
RBC # BLD: 4.13 M/UL — SIGNIFICANT CHANGE UP (ref 3.8–5.2)
RBC # FLD: 12.9 % — SIGNIFICANT CHANGE UP (ref 10.3–14.5)
SODIUM SERPL-SCNC: 121 MMOL/L — LOW (ref 135–145)
SODIUM SERPL-SCNC: 121 MMOL/L — LOW (ref 135–145)
SODIUM SERPL-SCNC: 124 MMOL/L — LOW (ref 135–145)
SODIUM UR-SCNC: 29 MMOL/L — SIGNIFICANT CHANGE UP
SODIUM UR-SCNC: 46 MMOL/L — SIGNIFICANT CHANGE UP
WBC # BLD: 5.7 K/UL — SIGNIFICANT CHANGE UP (ref 3.8–10.5)
WBC # FLD AUTO: 5.7 K/UL — SIGNIFICANT CHANGE UP (ref 3.8–10.5)

## 2020-06-15 PROCEDURE — 71045 X-RAY EXAM CHEST 1 VIEW: CPT | Mod: 26

## 2020-06-15 RX ORDER — CHLORHEXIDINE GLUCONATE 213 G/1000ML
1 SOLUTION TOPICAL DAILY
Refills: 0 | Status: DISCONTINUED | OUTPATIENT
Start: 2020-06-15 | End: 2020-06-18

## 2020-06-15 RX ORDER — SODIUM,POTASSIUM PHOSPHATES 278-250MG
1 POWDER IN PACKET (EA) ORAL
Refills: 0 | Status: COMPLETED | OUTPATIENT
Start: 2020-06-15 | End: 2020-06-17

## 2020-06-15 RX ORDER — SODIUM CHLORIDE 9 MG/ML
1000 INJECTION INTRAMUSCULAR; INTRAVENOUS; SUBCUTANEOUS
Refills: 0 | Status: DISCONTINUED | OUTPATIENT
Start: 2020-06-15 | End: 2020-06-16

## 2020-06-15 RX ORDER — IBUPROFEN 200 MG
200 TABLET ORAL ONCE
Refills: 0 | Status: COMPLETED | OUTPATIENT
Start: 2020-06-15 | End: 2020-06-15

## 2020-06-15 RX ADMIN — CHLORHEXIDINE GLUCONATE 1 APPLICATION(S): 213 SOLUTION TOPICAL at 12:00

## 2020-06-15 RX ADMIN — Medication 1 TABLET(S): at 11:20

## 2020-06-15 RX ADMIN — Medication 1 TABLET(S): at 22:34

## 2020-06-15 RX ADMIN — Medication 50 MICROGRAM(S): at 05:08

## 2020-06-15 RX ADMIN — PANTOPRAZOLE SODIUM 40 MILLIGRAM(S): 20 TABLET, DELAYED RELEASE ORAL at 07:54

## 2020-06-15 RX ADMIN — Medication 1 TABLET(S): at 16:37

## 2020-06-15 RX ADMIN — SODIUM CHLORIDE 50 MILLILITER(S): 9 INJECTION INTRAMUSCULAR; INTRAVENOUS; SUBCUTANEOUS at 11:21

## 2020-06-15 RX ADMIN — CEFTRIAXONE 100 MILLIGRAM(S): 500 INJECTION, POWDER, FOR SOLUTION INTRAMUSCULAR; INTRAVENOUS at 07:54

## 2020-06-15 RX ADMIN — AZITHROMYCIN 255 MILLIGRAM(S): 500 TABLET, FILM COATED ORAL at 08:00

## 2020-06-15 RX ADMIN — ENOXAPARIN SODIUM 40 MILLIGRAM(S): 100 INJECTION SUBCUTANEOUS at 11:21

## 2020-06-15 RX ADMIN — SODIUM CHLORIDE 100 MILLILITER(S): 9 INJECTION INTRAMUSCULAR; INTRAVENOUS; SUBCUTANEOUS at 00:08

## 2020-06-15 RX ADMIN — Medication 200 MILLIGRAM(S): at 00:22

## 2020-06-15 NOTE — PROGRESS NOTE ADULT - SUBJECTIVE AND OBJECTIVE BOX
Patient is a 85y old  Female who presents with a chief complaint of hyponatremia (2020 11:03)    pt seen in icu [x  ], reg med floor [   ], bed [x  ], chair at bedside [   ], a+o x3 [ x ], lethargic [  ],  nad [x  ]    walters [ x ],      Allergies    No Known Allergies        Vitals    T(F): 96.5 (06-15-20 @ 05:00), Max: 99.8 (20 @ 16:00)  HR: 63 (06-15-20 @ 06:00) (52 - 79)  BP: 152/89 (06-15-20 @ 05:00) (98/54 - 158/74)  RR: 18 (06-15-20 @ 06:00) (12 - 29)  SpO2: 98% (06-15-20 @ 06:00) (98% - 100%)  Wt(kg): --  CAPILLARY BLOOD GLUCOSE          Labs                          12.0   5.70  )-----------( 200      ( 15 Campos 2020 05:23 )             34.0       06-15    121<L>  |  87<L>  |  8   ----------------------------<  96  3.7   |  23  |  0.50    Ca    7.2<L>      15 Campos 2020 05:23  Phos  1.6     06-15  Mg     1.7     06-15    TPro  6.4  /  Alb  3.2<L>  /  TBili  0.8  /  DBili  x   /  AST  32  /  ALT  37  /  AlkPhos  68  06-15      CARDIAC MARKERS ( 2020 14:13 )  0.015 ng/mL / x     / x     / x     / x            .Blood Blood   @ 22:10   No growth to date.  --  --      .Blood Blood   @ 22:06   No growth to date.  --  --          Radiology Results      Meds    MEDICATIONS  (STANDING):  azithromycin  IVPB 500 milliGRAM(s) IV Intermittent every 24 hours  cefTRIAXone   IVPB 1000 milliGRAM(s) IV Intermittent every 24 hours  enoxaparin Injectable 40 milliGRAM(s) SubCutaneous daily  levothyroxine 50 MICROGram(s) Oral <User Schedule>  levothyroxine 75 MICROGram(s) Oral <User Schedule>  pantoprazole    Tablet 40 milliGRAM(s) Oral before breakfast  sodium chloride 0.9%. 1000 milliLiter(s) (100 mL/Hr) IV Continuous <Continuous>      MEDICATIONS  (PRN):  artificial  tears Solution 1 Drop(s) Both EYES three times a day PRN Dry Eyes      Physical Exam    Neuro :  no focal deficits  Respiratory: CTA B/L  CV: RRR, S1S2, no murmurs,   Abdominal: Soft, NT, ND +BS,  Extremities: No edema, + peripheral pulses    ASSESSMENT    Hyponatremia, hypo-osmolarity, or hypo-osmolar hyponatremia   r/o pna  r/o covid-19  h/o HTN (hypertension)  S/P  section      PLAN        hold HCTZ,   cont losartan and bystolic   s/p NS 500cc  s/p DDAVP and D5 overnight  renal cons  serum na improving   cxr with Probable small infiltrates off both lower sergey left greater than right noted above  cont rocephin and zithromax  f/u blood cx  pulm cons.  covid test neg noted above  contact and airborne isolation  cont albuterol inhaler   F/u D-dimer, esr, crp, ferritin, lactate,   cont supportive care with tylenol prn, robitussin prn and O2 via nasal  as needed   pt DNR/DNI/no invasive procedure  cont current meds  mgmt as per icu Patient is a 85y old  Female who presents with a chief complaint of hyponatremia (2020 11:03)    pt seen in icu [x  ], reg med floor [   ], bed [x  ], chair at bedside [   ], a+o x3 [ x ], lethargic [  ],  nad [x  ]    walters [ x ],      Allergies    No Known Allergies        Vitals    T(F): 96.5 (06-15-20 @ 05:00), Max: 99.8 (20 @ 16:00)  HR: 63 (06-15-20 @ 06:00) (52 - 79)  BP: 152/89 (06-15-20 @ 05:00) (98/54 - 158/74)  RR: 18 (06-15-20 @ 06:00) (12 - 29)  SpO2: 98% (06-15-20 @ 06:00) (98% - 100%)  Wt(kg): --  CAPILLARY BLOOD GLUCOSE          Labs                          12.0   5.70  )-----------( 200      ( 15 Campos 2020 05:23 )             34.0       06-15    121<L>  |  87<L>  |  8   ----------------------------<  96  3.7   |  23  |  0.50    Ca    7.2<L>      15 Campos 2020 05:23  Phos  1.6     06-15  Mg     1.7     06-15    TPro  6.4  /  Alb  3.2<L>  /  TBili  0.8  /  DBili  x   /  AST  32  /  ALT  37  /  AlkPhos  68  06-15      CARDIAC MARKERS ( 2020 14:13 )  0.015 ng/mL / x     / x     / x     / x            .Blood Blood   @ 22:10   No growth to date.  --  --      .Blood Blood   @ 22:06   No growth to date.  --  --          Radiology Results      Meds    MEDICATIONS  (STANDING):  azithromycin  IVPB 500 milliGRAM(s) IV Intermittent every 24 hours  cefTRIAXone   IVPB 1000 milliGRAM(s) IV Intermittent every 24 hours  enoxaparin Injectable 40 milliGRAM(s) SubCutaneous daily  levothyroxine 50 MICROGram(s) Oral <User Schedule>  levothyroxine 75 MICROGram(s) Oral <User Schedule>  pantoprazole    Tablet 40 milliGRAM(s) Oral before breakfast  sodium chloride 0.9%. 1000 milliLiter(s) (100 mL/Hr) IV Continuous <Continuous>      MEDICATIONS  (PRN):  artificial  tears Solution 1 Drop(s) Both EYES three times a day PRN Dry Eyes      Physical Exam    Neuro :  no focal deficits  Respiratory: CTA B/L  CV: RRR, S1S2, no murmurs,   Abdominal: Soft, NT, ND +BS,  Extremities: No edema, + peripheral pulses    ASSESSMENT    Hyponatremia, hypo-osmolarity, or hypo-osmolar hyponatremia   r/o pna  r/o covid-19  h/o HTN (hypertension)  S/P  section      PLAN        hold HCTZ,   cont losartan and bystolic   s/p NS 500cc  s/p DDAVP and D5 overnight  renal f/u  serum na improving   continue with NS 50 ML per hour till effect of desmopressin fades  cxr with Probable small infiltrates off both lower sergey left greater than right noted above  cont rocephin and zithromax  blood cx neg noted above  pulm f/u  covid test neg noted   contact and airborne isolation d/c'd  cont albuterol inhaler   cont O2 via nasal  as needed   pt DNR/DNI/no invasive procedure  cont current meds  mgmt as per icu

## 2020-06-15 NOTE — PHYSICAL THERAPY INITIAL EVALUATION ADULT - GENERAL OBSERVATIONS, REHAB EVAL
Consult received, chart reviewed. Patient received supine in bed, NAD, +cardiac monitoring, +walters. Patient agreed to EVALUATION from Physical Therapist. RASS 0

## 2020-06-15 NOTE — PHYSICAL THERAPY INITIAL EVALUATION ADULT - PLANNED THERAPY INTERVENTIONS, PT EVAL
gait training/postural re-education/transfer training/balance training/bed mobility training/ROM/strengthening

## 2020-06-15 NOTE — PHYSICAL THERAPY INITIAL EVALUATION ADULT - CRITERIA FOR SKILLED THERAPEUTIC INTERVENTIONS
functional limitations in following categories/rehab potential/anticipated equipment needs at discharge/predicted duration of therapy intervention/impairments found/anticipated discharge recommendation/risk reduction/prevention/therapy frequency

## 2020-06-15 NOTE — CONSULT NOTE ADULT - CONVERSATION DETAILS
6/15/20: Patient alert/oriented; deferring to niece/Nataly for medical decision making. Spoke with Nataly on the phone - again discussed risks vs benefits of resuscitation, intubation, artificial nutrition. Niece confirms DNR/DNI status; reports patient has "very clear wishes." New MOLST completed as per surrogate's wishes: DNR / DNI / no feeding tube / trial IV fluids / use abx. All questions answered; supportive counseling provided.

## 2020-06-15 NOTE — CONSULT NOTE ADULT - PROBLEM SELECTOR RECOMMENDATION 3
Na+ replacement  monitor BMP
CXR indicates Interval improvement in bilateral lower lung infiltrates. Patient on room air; continues IV abx. DNR / DNI on file.

## 2020-06-15 NOTE — DIETITIAN INITIAL EVALUATION ADULT. - DOB: +DATEOFBIRTH
CARDIOVASCULAR - ADULT    • Maintains optimal cardiac output and hemodynamic stability Progressing    • Absence of cardiac arrhythmias or at baseline Progressing        HEMATOLOGIC - ADULT    • Maintains hematologic stability Progressing        Impaired Ac Statement Selected

## 2020-06-15 NOTE — PHYSICAL THERAPY INITIAL EVALUATION ADULT - ACTIVE RANGE OF MOTION EXAMINATION, REHAB EVAL
bilateral  lower extremity Active ROM was WFL (within functional limits)/except b/l hips ~3/4 range/bilateral upper extremity Active ROM was WFL (within functional limits)

## 2020-06-15 NOTE — PROGRESS NOTE ADULT - SUBJECTIVE AND OBJECTIVE BOX
INTERVAL HPI/OVERNIGHT EVENTS:   No acute event over night   Patient agitated in the morning , needed reassurance in regards to her clinical status  IVf decreased to 50cc/hr     PRESSORS: [ ] YES [ ] NO  WHICH:    ANTIBIOTICS:                  DATE STARTED:  ANTIBIOTICS:                  DATE STARTED:  ANTIBIOTICS:                  DATE STARTED:    Antimicrobial:  azithromycin  IVPB 500 milliGRAM(s) IV Intermittent every 24 hours  cefTRIAXone   IVPB 1000 milliGRAM(s) IV Intermittent every 24 hours    Cardiovascular:    Pulmonary:    Hematalogic:  enoxaparin Injectable 40 milliGRAM(s) SubCutaneous daily    Other:  artificial  tears Solution 1 Drop(s) Both EYES three times a day PRN  chlorhexidine 2% Cloths 1 Application(s) Topical daily  levothyroxine 50 MICROGram(s) Oral <User Schedule>  levothyroxine 75 MICROGram(s) Oral <User Schedule>  pantoprazole    Tablet 40 milliGRAM(s) Oral before breakfast  potassium acid phosphate/sodium acid phosphate tablet (K-PHOS No. 2) 1 Tablet(s) Oral four times a day with meals  sodium chloride 0.9%. 1000 milliLiter(s) IV Continuous <Continuous>    artificial  tears Solution 1 Drop(s) Both EYES three times a day PRN  azithromycin  IVPB 500 milliGRAM(s) IV Intermittent every 24 hours  cefTRIAXone   IVPB 1000 milliGRAM(s) IV Intermittent every 24 hours  chlorhexidine 2% Cloths 1 Application(s) Topical daily  enoxaparin Injectable 40 milliGRAM(s) SubCutaneous daily  levothyroxine 50 MICROGram(s) Oral <User Schedule>  levothyroxine 75 MICROGram(s) Oral <User Schedule>  pantoprazole    Tablet 40 milliGRAM(s) Oral before breakfast  potassium acid phosphate/sodium acid phosphate tablet (K-PHOS No. 2) 1 Tablet(s) Oral four times a day with meals  sodium chloride 0.9%. 1000 milliLiter(s) IV Continuous <Continuous>    Drug Dosing Weight    Weight (kg): 50.8 (2020 13:38)    CENTRAL LINE: [ ] YES [ ] NO  LOCATION:         HO: [ ] YES [ ] NO          A-LINE:  [ ] YES [ ] NO  LOCATION:             ICU Vital Signs Last 24 Hrs  T(C): 35.9 (15 Campos 2020 08:00), Max: 37.7 (2020 16:00)  T(F): 96.6 (15 Campos 2020 08:00), Max: 99.8 (2020 16:00)  HR: 101 (15 Campos 2020 11:15) (52 - 101)  BP: 162/83 (15 Campos 2020 11:15) (98/54 - 162/83)  BP(mean): 102 (15 Campos 2020 11:15) (63 - 103)  ABP: --  ABP(mean): --  RR: 22 (15 Campos 2020 11:15) (13 - 29)  SpO2: 100% (15 Campos 2020 11:15) (98% - 100%)             @ 07:01  -  06-15 @ 07:00  --------------------------------------------------------  IN: 2950 mL / OUT: 2215 mL / NET: 735 mL      PHYSICAL EXAM:    GENERAL: NAD, well-groomed, well-developed,   EYES: EOMI, PERRLA,   NECK: Supple, No JVD; Normal thyroid; Trachea midline  NERVOUS SYSTEM:  Alert & Oriented X3,    CHEST/LUNG: No rales, rhonchi, wheezing   HEART: Regular rate and rhythm; No murmurs,   ABDOMEN: Soft, Nontender, Nondistended; Bowel sounds present  EXTREMITIES:  2+ Peripheral Pulses, No clubbing, cyanosis, or edema        LABS:  CBC Full  -  ( 15 Campos 2020 05:23 )  WBC Count : 5.70 K/uL  RBC Count : 4.13 M/uL  Hemoglobin : 12.0 g/dL  Hematocrit : 34.0 %  Platelet Count - Automated : 200 K/uL  Mean Cell Volume : 82.3 fl  Mean Cell Hemoglobin : 29.1 pg  Mean Cell Hemoglobin Concentration : 35.3 gm/dL  Auto Neutrophil # : 3.52 K/uL  Auto Lymphocyte # : 1.67 K/uL  Auto Monocyte # : 0.40 K/uL  Auto Eosinophil # : 0.05 K/uL  Auto Basophil # : 0.04 K/uL  Auto Neutrophil % : 61.7 %  Auto Lymphocyte % : 29.3 %  Auto Monocyte % : 7.0 %  Auto Eosinophil % : 0.9 %  Auto Basophil % : 0.7 %    06-15    121<L>  |  87<L>  |  8   ----------------------------<  96  3.7   |  23  |  0.50    Ca    7.2<L>      15 Campos 2020 05:23  Phos  1.6     06-15  Mg     1.7     -15    TPro  6.4  /  Alb  3.2<L>  /  TBili  0.8  /  DBili  x   /  AST  32  /  ALT  37  /  AlkPhos  68  06-15    PT/INR - ( 2020 14:13 )   PT: 10.8 sec;   INR: 0.96 ratio         PTT - ( 2020 14:13 )  PTT:30.6 sec  Urinalysis Basic - ( 2020 20:23 )    Color: Yellow / Appearance: Clear / S.005 / pH: x  Gluc: x / Ketone: Negative  / Bili: Negative / Urobili: Negative   Blood: x / Protein: Negative / Nitrite: Negative   Leuk Esterase: Negative / RBC: 0-2 /HPF / WBC 0-2 /HPF   Sq Epi: x / Non Sq Epi: Occasional /HPF / Bacteria: Trace /HPF      Culture Results:   No growth to date. ( @ 22:10)  Culture Results:   No growth to date. ( @ 22:06)      RADIOLOGY & ADDITIONAL STUDIES REVIEWED:  ***    [ ]GOALS OF CARE DISCUSSION WITH PATIENT/FAMILY/PROXY:    CRITICAL CARE TIME SPENT: 35 minutes

## 2020-06-15 NOTE — CONSULT NOTE ADULT - PROBLEM SELECTOR RECOMMENDATION 9
panculture  antibiotics  oxygen supp prn  Covid-19 pcr  isolation precautions  check procalcitonin
Patient noted to be mildly anxious at time of exam. Able to be redirected; reports she just wants to "go home." Improved after speaking with niece/Nataly on the phone. Monitor for worsening symptoms.

## 2020-06-15 NOTE — CHART NOTE - NSCHARTNOTEFT_GEN_A_CORE
Upon Nutritional Assessment by the Registered Dietitian your patient was determined to meet criteria / has evidence of the following diagnosis/diagnoses:          [ ]  Mild Protein Calorie Malnutrition        [x ]  Moderate Protein Calorie Malnutrition        [ ] Severe Protein Calorie Malnutrition        [ ] Unspecified Protein Calorie Malnutrition        [ ] Underweight / BMI <19        [ ] Morbid Obesity / BMI > 40      Findings as based on:  •  Comprehensive nutrition assessment and consultation  •  Calorie counts (nutrient intake analysis)  •  Food acceptance and intake status from observations by staff  •  Follow up  •  Patient education  •  Intervention secondary to interdisciplinary rounds  •   concerns      Treatment:    The following diet has been recommended:    Add  Ensure Compact 1 bottle TID  PROVIDER Section:     By signing this assessment you are acknowledging and agree with the diagnosis/diagnoses assigned by the Registered Dietitian    Comments:

## 2020-06-15 NOTE — PROGRESS NOTE ADULT - SUBJECTIVE AND OBJECTIVE BOX
Problem List:  Hyponatremia  Renal consult was called for hyponatremia , initial sodium 113 with 0.8reatinine. Initial urine sodium was 17 and urine osmolality 110 - 109. After  bolus of NS her Sodium increased from 113 to 122 in less than 12 hours. She received 1 mcg  Desmopressin and with that her sodiunm reduced to 116 and urinary sodium excretion increased .  During the night sodium was 116 , NS was started in PM . In AM sodium 121  Urine lytes at 12 pm sodium 46 and osmolality 158.    PAST MEDICAL & SURGICAL HISTORY:  HTN (hypertension)  S/P  section      No Known Allergies      MEDICATIONS  (STANDING):  azithromycin  IVPB 500 milliGRAM(s) IV Intermittent every 24 hours  cefTRIAXone   IVPB 1000 milliGRAM(s) IV Intermittent every 24 hours  chlorhexidine 2% Cloths 1 Application(s) Topical daily  enoxaparin Injectable 40 milliGRAM(s) SubCutaneous daily  levothyroxine 50 MICROGram(s) Oral <User Schedule>  levothyroxine 75 MICROGram(s) Oral <User Schedule>  pantoprazole    Tablet 40 milliGRAM(s) Oral before breakfast  potassium acid phosphate/sodium acid phosphate tablet (K-PHOS No. 2) 1 Tablet(s) Oral four times a day with meals  sodium chloride 0.9%. 1000 milliLiter(s) (50 mL/Hr) IV Continuous <Continuous>    MEDICATIONS  (PRN):  artificial  tears Solution 1 Drop(s) Both EYES three times a day PRN Dry Eyes                            12.0   5.70  )-----------( 200      ( 15 Campos 2020 05:23 )             34.0     06-15    121<L>  |  87<L>  |  8   ----------------------------<  73  3.7   |  24  |  0.56    Ca    7.3<L>      15 Campos 2020 11:52  Phos  1.8     06-15  Mg     1.8     -15    TPro  6.4  /  Alb  3.2<L>  /  TBili  0.8  /  DBili  x   /  AST  32  /  ALT  37  /  AlkPhos  68  06-15    PT/INR - ( 2020 14:13 )   PT: 10.8 sec;   INR: 0.96 ratio         PTT - ( 2020 14:13 )  PTT:30.6 sec    Osmolality, Random Urine: 158 mos/kg (06-15 @ 11:51)  Sodium, Random Urine: 46 mmol/L (06-15 @ 11:51)  Osmolality, Random Urine: 389 mos/kg ( @ 18:30)  Sodium, Random Urine: 64 mmol/L ( @ 18:30)  Osmolality, Random Urine: 372 mos/kg ( @ 12:46)  Sodium, Random Urine: 87 mmol/L ( @ 12:45)  Creatinine, Random Urine: 21 mg/dL ( @ 12:45)  Sodium, Random Urine: 87 mmol/L ( @ 08:40)  Potassium, Random Urine: 41 mmol/L ( @ 08:40)  Osmolality, Random Urine: 358 mos/kg ( @ 08:40)  Sodium, Random Urine: 66 mmol/L ( @ 01:17)  Potassium, Random Urine: 58 mmol/L ( @ 01:17)  Osmolality, Random Urine: 373 mos/kg ( @ 01:17)  Sodium, Random Urine: 19 mmol/L ( @ 20:23)  Osmolality, Random Urine: 109 mos/kg ( @ 20:23)  Potassium, Random Urine: 8 mmol/L ( @ 20:23)  Potassium, Random Urine: 7 mmol/L ( @ 17:33)  Osmolality, Random Urine: 110 mos/kg ( @ 17:33)  Sodium, Random Urine: 17 mmol/L ( @ 17:27)  Creatinine, Random Urine: <13 mg/dL ( @ 17:27)  Chloride, Random Urine: 13 mmol/L ( @ 17:27)      REVIEW OF SYSTEMS:  General: no fever no chills, no weight loss.  EYES/ENT: No visual changes;  No vertigo, no headache.  NECK: No pain or stiffness  RESPIRATORY: No cough, wheezing, hemoptysis; No shortness of breath  CARDIOVASCULAR: No chest pain or palpitations. No Edema  GASTROINTESTINAL: No abdominal or epigastric pain. No nausea, vomiting.   GENITOURINARY: No dysuria, frequency, foamy urine, urinary urgency, incontinence or hematuria  NEUROLOGICAL: No numbness or weakness, no tremor , no dizziness.   Muscle skeletal : no joint pain and no swelling of joints and limbs.          VITALS:  T(F): 96.6 (06-15-20 @ 08:00), Max: 99.8 (20 @ 16:00)  HR: 101 (06-15-20 @ 11:15)  BP: 162/83 (06-15-20 @ 11:15)  RR: 22 (06-15-20 @ 11:15)  SpO2: 100% (06-15-20 @ 11:15)  Wt(kg): --     @ 07:01  -  06-15 @ 07:00  --------------------------------------------------------  IN: 2950 mL / OUT: 2215 mL / NET: 735 mL    06-15 @ 07:01  -  06-15 @ 12:32  --------------------------------------------------------  IN: 1130 mL / OUT: 750 mL / NET: 380 mL        PHYSICAL EXAM:  Constitutional: well developed, no diaphoresis, no distress.  Neck: No JVD, no carotid bruit, supple, no adenopathy  Respiratory: air entrance B/L, no wheezes, rales or rhonchi  Cardiovascular: S1, S2, RRR, no pericardial rub, no murmur  Abdomen: BS+, soft, no tenderness, no bruit  Pelvis: bladder nondistended  Extremities: No cyanosis or clubbing. No peripheral edema.

## 2020-06-15 NOTE — PROGRESS NOTE ADULT - ASSESSMENT
Hyponatremia due to HCTZ and Losartan, salt loosing nephropathy  Post Desmopressin treatment due to rapid increased in sodium level , followed by side effect of Desmopressin.  At present urine sodium 46 and Osmolality 158.  Continue N/S 50 cc per hour, DC fluid restriction .  Allowed intake of minimum 2 liters per day.  Follow lytes q  6 hours

## 2020-06-15 NOTE — CONSULT NOTE ADULT - PROBLEM SELECTOR RECOMMENDATION 5
Patient alert/oriented; deferring to niece/Nataly for medical decision making. MOLST completed as per surrogate's wishes: DNR / DNI / no feeding tube / trial IV fluids / use abx. See Goals of Care section above for additional details.

## 2020-06-15 NOTE — CONSULT NOTE ADULT - PROBLEM SELECTOR RECOMMENDATION 2
monitor BP  cont meds
Improved to 121 from 113 upon admission. Patient alert/oriented, with no c/o. Continues IVF. Nephro following. DNR/DNI on file.

## 2020-06-15 NOTE — CONSULT NOTE ADULT - PROBLEM SELECTOR RECOMMENDATION 4
Bronchodilators prn  oxygen supp  symbicort 160/4.5 mcgs 2 ;puffs po BID
At baseline, patient ambulatory with cane, independent of ADLs, from home. At present, patient noted with +generalized weakness, +2 bilateral  strength, requires assistance with ADLs. PT recommending home PT.

## 2020-06-15 NOTE — PHYSICAL THERAPY INITIAL EVALUATION ADULT - LIVES WITH, PROFILE
Patient called to schedule one year AAA follow up with Dr. Meraz. Dr. Mreaz has an order for a CTA abdomen/pelvis for follow up. Patient states that her insurance covers the US and she was wondering if she could do another US instead of the CTA for follow up this year.   Spouse in apartment, elevator present no stairs required

## 2020-06-15 NOTE — DIETITIAN INITIAL EVALUATION ADULT. - PERTINENT LABORATORY DATA
06-15 Na121 mmol/L<L> Glu 73 mg/dL K+ 3.7 mmol/L Cr  0.56 mg/dL BUN 8 mg/dL 06-15 Phos 1.8 mg/dL<L> 06-15 Alb 3.2 g/dL<L>

## 2020-06-15 NOTE — PROGRESS NOTE ADULT - ASSESSMENT
86 yo F from home, AAOX3, ambulates with cane with PMH of HTN and hypothyroidism BIBA to ED c/o feeling fatigue and generalized weakness for past 3 weeks, MADDEN and decreased appetite. Found Na 113, and CXR noted b/l lower lobes infiltrates. Admit to ICU for Hyponatremia and PNA       Problems:   -Hyponatremia   -PNA, r/o COVID   -HTN  -Hypothyroidism     Plan:  Neuro:   AAOX3, no issue     Respi:   Saturation 97% on RA , comfortable, no sob/work of breath   CXR : small infiltrate of both lower sergey L>R    CVS:   HTN  -hold HCTZ, resume losartan and bystolic   ECG: NSR, incomplete RBBB    GI:   Dash/fluid restriction diet    Heme:   no issue     Endo:   Hypothyroidism  -c/w synthroid home dose   - TSH 2.48   keep FS    Hb A1c 5.5    ID:  PNA  -CXR noted b/l lower lobes infiltrates.   -c/w zithromax and rocephin   - BCx NGTD and  COVID negX2      Renal:   Hyponatremia 2/2 diuretics vs SIADH   -Na on admission 113  -s/p NS 500cc  Na improving 113>116>121/122 in first 12 hours   s/p DDAVP and D5 overnight  -BMP q4hrs and Urine Na/K/Osmo q4hrs   -Nephro consulted Dr. Gupta     PPx: on dvt and gi ppx    GOC: DNR/DNI/no invasive procedure. 86 yo F from home, AAOX3, ambulates with cane with PMH of HTN and hypothyroidism BIBA to ED c/o feeling fatigue and generalized weakness for past 3 weeks, MADDEN and decreased appetite. Found Na 113, and CXR noted b/l lower lobes infiltrates. Admit to ICU for Hyponatremia and PNA       Problems:   -Hyponatremia   -PNA, r/o COVID   -HTN  -Hypothyroidism     Plan:  Neuro:   AAOX3, no issue     Respi:   Saturation 97% on RA , comfortable, no sob/work of breath   CXR : small infiltrate of both lower sergey L>R    CVS:   HTN  -hold HCTZ, resume losartan and bystolic   ECG: NSR, incomplete RBBB    GI:   Dash/fluid restriction diet    Heme:   no issue     Endo:   Hypothyroidism  -c/w synthroid home dose   - TSH 2.48   keep FS    Hb A1c 5.5    ID:  PNA  -CXR noted b/l lower lobes infiltrates.   -c/w zithromax and rocephin   - BCx NGTD and  COVID negX2      Renal:   Hyponatremia 2/2 diuretics vs SIADH   -Na on admission 113   Na improving 113>116>121/122 in first 12 hours   s/p DDAVP and D5   - currently on IVF 50cc/hr  -BMP q4hrs and Urine Na/K/Osmo q4hrs   -Nephro consulted Dr. Gupta   - Patient refused Christy removal , will re assess tomorrow  - U/o ; 150 c/hr      PPx: on dvt and gi ppx    GOC: DNR/DNI/no invasive procedure.

## 2020-06-15 NOTE — CONSULT NOTE ADULT - CONSULT REASON
PNA 84 y/o F from home admitted to ICU for PNA and hyponatremia. Request to establish goals of care.

## 2020-06-15 NOTE — CONSULT NOTE ADULT - SUBJECTIVE AND OBJECTIVE BOX
HPI:  86 yo F from home, AAOX3, ambulates with cane with PMH of HTN and hypothyroidism BIBA to ED c/o feeling fatigue and generalized weakness for past 3 weeks, MADDEN and decreased appetite. Pt checked her B/P, was high 190/80.  Pt denies fever, CP, edema, palpitations, coughing, dizziness, orthopnea, abd pain, n/v/c/d. Last BM this am.     ED course, vitals noted temp 97, HR 70, /95, RR 18, SO2 97% on RA. Labs noted Na 113,  CXR noted b/l lower lobes infiltrates. s/p zithromax and rocephin in ED.     GOC: DNR/DNI/NO invasive procedure. HCP Nicole Ingram [phone]. (2020 17:03)      PAST MEDICAL & SURGICAL HISTORY:  HTN (hypertension)  S/P  section      SOCIAL HISTORY:    Admitted from:  home assisted living [facility]   Substance abuse history:              Tobacco hx:                  Alcohol hx:              Home Opioid hx:  Faith:                                    Preferred Language:    Surrogate/HCP/Guardian:            Phone#:    FAMILY HISTORY:    Baseline ADLs (prior to admission):    Allergies    No Known Allergies    Intolerances      Present Symptoms:   Dyspnea:   Nausea/Vomiting:   Anxiety:  Depressed   Fatigue:  Loss of appetite:   Pain:                                location:          Review of Systems: [All others negative or Unable to obtain due to poor mentation]    MEDICATIONS  (STANDING):  azithromycin  IVPB 500 milliGRAM(s) IV Intermittent every 24 hours  cefTRIAXone   IVPB 1000 milliGRAM(s) IV Intermittent every 24 hours  chlorhexidine 2% Cloths 1 Application(s) Topical daily  enoxaparin Injectable 40 milliGRAM(s) SubCutaneous daily  levothyroxine 50 MICROGram(s) Oral <User Schedule>  levothyroxine 75 MICROGram(s) Oral <User Schedule>  pantoprazole    Tablet 40 milliGRAM(s) Oral before breakfast  potassium acid phosphate/sodium acid phosphate tablet (K-PHOS No. 2) 1 Tablet(s) Oral four times a day with meals  sodium chloride 0.9%. 1000 milliLiter(s) (50 mL/Hr) IV Continuous <Continuous>    MEDICATIONS  (PRN):  artificial  tears Solution 1 Drop(s) Both EYES three times a day PRN Dry Eyes      PHYSICAL EXAM:    Vital Signs Last 24 Hrs  T(C): 35.9 (15 Campos 2020 08:00), Max: 37.7 (2020 16:00)  T(F): 96.6 (15 Campos 2020 08:00), Max: 99.8 (2020 16:00)  HR: 101 (15 Campos 2020 11:15) (52 - 101)  BP: 162/83 (15 Campos 2020 11:15) (98/54 - 162/83)  BP(mean): 102 (15 Campos 2020 11:15) (63 - 103)  RR: 22 (15 Campos 2020 11:15) (13 - 29)  SpO2: 100% (15 Campos 2020 11:15) (98% - 100%)    General: alert  oriented x ____    [lethargic distressed cachexia  nonverbal  unarousable verbal]  Karnofsky Performance Score/Palliative Performance Status Version2:     %    HEENT: normal  dry mouth  ET tube/trach oral lesions:  Lungs: comfortable tachypnea/labored breathing  excessive secretions  CV: normal  tachycardia  GI: normal  distended  tender  incontinent               PEG/NG/OG tube  constipation  last BM:   : normal  incontinent  oliguria/anuria  walters  Musculoskeletal: normal  weakness  edema             ambulatory  bedbound/wheelchair bound  Skin: normal  pressure ulcers: stage: edema: other:  Neuro: no deficits cognitive impairment dsyphagia/dysarthria paresis: other:  Oral intake ability: unable/only mouth care [minimal moderate full capability]  Diet: [NPO]    LABS:                        12.0   5.70  )-----------( 200      ( 15 Campos 2020 05:23 )             34.0     06-15    121<L>  |  87<L>  |  8   ----------------------------<  73  3.7   |  24  |  x     Ca    7.3<L>      15 Campos 2020 11:52  Phos  1.6     06-15  Mg     1.8     06-15    TPro  6.4  /  Alb  3.2<L>  /  TBili  0.8  /  DBili  x   /  AST  32  /  ALT  37  /  AlkPhos  68  06-15    Urinalysis Basic - ( 2020 20:23 )    Color: Yellow / Appearance: Clear / S.005 / pH: x  Gluc: x / Ketone: Negative  / Bili: Negative / Urobili: Negative   Blood: x / Protein: Negative / Nitrite: Negative   Leuk Esterase: Negative / RBC: 0-2 /HPF / WBC 0-2 /HPF   Sq Epi: x / Non Sq Epi: Occasional /HPF / Bacteria: Trace /HPF        RADIOLOGY & ADDITIONAL STUDIES:    ADVANCE DIRECTIVES:   Advanced Care Planning discussion total time spent: HPI:  84 yo F from home, AAOX3, ambulates with cane with PMH of HTN and hypothyroidism BIBA to ED c/o feeling fatigue and generalized weakness for past 3 weeks, MADDEN and decreased appetite. Pt checked her B/P, was high 190/80.  Pt denies fever, CP, edema, palpitations, coughing, dizziness, orthopnea, abd pain, n/v/c/d. Last BM this am.     ED course, vitals noted temp 97, HR 70, /95, RR 18, SO2 97% on RA. Labs noted Na 113,  CXR noted b/l lower lobes infiltrates. s/p zithromax and rocephin in ED. DNR on file.     GOC: DNR/DNI/NO invasive procedure. HCP Niece Nataly [phone]. (2020 17:03)    Patient admitted to ICU for PNA and hyponatremia. Continues IV abx.       PAST MEDICAL & SURGICAL HISTORY:  HTN (hypertension)  S/P  section      SOCIAL HISTORY:    Admitted from:  home, lives with   Substance abuse history:    none          Tobacco hx:    none              Alcohol hx: none             Home Opioid hx: none  Mu-ism:   Restoration                                 Preferred Language: english, Hebrew    Surrogate/HCP/Guardian:  Nataly Velazquez (niece): [phone]    FAMILY HISTORY:  No significant family history reported.     Baseline ADLs (prior to admission): AAOX3, ambulates with cane, independent of ADLs    No Known Allergies    Present Symptoms:   Dyspnea: none  Nausea/Vomiting:  none  Anxiety: mild  Loss of appetite: none  Pain:  none        Review of Systems: Patient with no c/o at time of exam.     MEDICATIONS  (STANDING):  azithromycin  IVPB 500 milliGRAM(s) IV Intermittent every 24 hours  cefTRIAXone   IVPB 1000 milliGRAM(s) IV Intermittent every 24 hours  chlorhexidine 2% Cloths 1 Application(s) Topical daily  enoxaparin Injectable 40 milliGRAM(s) SubCutaneous daily  levothyroxine 50 MICROGram(s) Oral <User Schedule>  levothyroxine 75 MICROGram(s) Oral <User Schedule>  pantoprazole    Tablet 40 milliGRAM(s) Oral before breakfast  potassium acid phosphate/sodium acid phosphate tablet (K-PHOS No. 2) 1 Tablet(s) Oral four times a day with meals  sodium chloride 0.9%. 1000 milliLiter(s) (50 mL/Hr) IV Continuous <Continuous>    MEDICATIONS  (PRN):  artificial  tears Solution 1 Drop(s) Both EYES three times a day PRN Dry Eyes      PHYSICAL EXAM:    Vital Signs Last 24 Hrs  T(C): 35.9 (15 Campos 2020 08:00), Max: 37.7 (2020 16:00)  T(F): 96.6 (15 Campos 2020 08:00), Max: 99.8 (2020 16:00)  HR: 101 (15 Campos 2020 11:15) (52 - 101)  BP: 162/83 (15 Campos 2020 11:15) (98/54 - 162/83)  BP(mean): 102 (15 Campos 2020 11:15) (63 - 103)  RR: 22 (15 Campos 2020 11:15) (13 - 29)  SpO2: 100% (15 Campos 2020 11:15) (98% - 100%)    General: alert  oriented, verbal, follow commands. +mildly anxious. No signs of distress.   Karnofsky Performance Score/Palliative Performance Status Version2:   30  %    HEENT: normal    Lungs: comfortable, on room air. No signs of respiratory distress.   CV: S1S2, + tachycardia  GI: abdomen soft, nontender, nondistended  :   walters  Musculoskeletal: +generalized weakness, +2 bilateral  strength. Assist with ADLs  Skin: +loss of muscle mass, no BLE edema  Neuro: patient alert/oriented, verbal, follows commands, +mildly anxious  Oral intake ability: full capability  Diet: DASH/TLC    LABS:                        12.0   5.70  )-----------( 200      ( 15 Campos 2020 05:23 )             34.0     06-15    121<L>  |  87<L>  |  8   ----------------------------<  73  3.7   |  24  |  x     Ca    7.3<L>      15 Campos 2020 11:52  Phos  1.6     06-15  Mg     1.8     06-15    TPro  6.4  /  Alb  3.2<L>  /  TBili  0.8  /  DBili  x   /  AST  32  /  ALT  37  /  AlkPhos  68  06-15    Urinalysis Basic - ( 2020 20:23 )    Color: Yellow / Appearance: Clear / S.005 / pH: x  Gluc: x / Ketone: Negative  / Bili: Negative / Urobili: Negative   Blood: x / Protein: Negative / Nitrite: Negative   Leuk Esterase: Negative / RBC: 0-2 /HPF / WBC 0-2 /HPF   Sq Epi: x / Non Sq Epi: Occasional /HPF / Bacteria: Trace /HPF    Albumin: 3.2    RADIOLOGY & ADDITIONAL STUDIES:  < from: Xray Chest 1 View- PORTABLE-Urgent (06.15.20 @ 09:51) >    EXAM:  XR CHEST PORTABLE URGENT 1V                            PROCEDURE DATE:  06/15/2020          INTERPRETATION:    DATE OF STUDY: 6/15/2020    PRIOR:20    CLINICAL INDICATION: Assess for pulmonary edema.    TECHNIQUE: portable chest.    FINDINGS:   Thoracic aortic atheromatous changes and ectasia are stable  Heart is top normal in size.  Pulmonary vascularity is normal.  Interval improvement in previously seen bilateral lower lung infiltrates.  No pleural effusion or pneumothorax.  There are degenerative changes of the thoracic spine.    IMPRESSION:  Interval improvement in bilateral lower lung infiltrates c/w 20 exam.    < end of copied text >      ADVANCE DIRECTIVES: MOLST: DNR/DNI / no feeding tube / trial IV fluids / use abx. HPI:  86 yo F from home, AAOX3, ambulates with cane with PMH of HTN and hypothyroidism BIBA to ED c/o feeling fatigue and generalized weakness for past 3 weeks, MADDEN and decreased appetite. Pt checked her B/P, was high 190/80.  Pt denies fever, CP, edema, palpitations, coughing, dizziness, orthopnea, abd pain, n/v/c/d. Last BM this am.     ED course, vitals noted temp 97, HR 70, /95, RR 18, SO2 97% on RA. Labs noted Na 113,  CXR noted b/l lower lobes infiltrates. s/p zithromax and rocephin in ED. DNR on file.     GOC: DNR/DNI/NO invasive procedure. HCP Niece Nataly [phone]. (2020 17:03)    Patient admitted to ICU for PNA and hyponatremia. Continues IV abx.       PAST MEDICAL & SURGICAL HISTORY:  HTN (hypertension)  S/P  section      SOCIAL HISTORY:    Admitted from:  home, lives with   Substance abuse history:    none          Tobacco hx:    none              Alcohol hx: none             Home Opioid hx: none  Bahai:   Yazidism                                 Preferred Language: english, Sami    Surrogate/HCP/Guardian:  Nataly Velazquez (niece): [phone]    FAMILY HISTORY:  No significant family history reported.     Baseline ADLs (prior to admission): AAOX3, ambulates with cane, independent of ADLs    No Known Allergies    Present Symptoms:   Dyspnea: none  Nausea/Vomiting:  none  Anxiety: mild  Loss of appetite: none  Pain:  none        Review of Systems: Patient with no c/o at time of exam.     MEDICATIONS  (STANDING):  azithromycin  IVPB 500 milliGRAM(s) IV Intermittent every 24 hours  cefTRIAXone   IVPB 1000 milliGRAM(s) IV Intermittent every 24 hours  chlorhexidine 2% Cloths 1 Application(s) Topical daily  enoxaparin Injectable 40 milliGRAM(s) SubCutaneous daily  levothyroxine 50 MICROGram(s) Oral <User Schedule>  levothyroxine 75 MICROGram(s) Oral <User Schedule>  pantoprazole    Tablet 40 milliGRAM(s) Oral before breakfast  potassium acid phosphate/sodium acid phosphate tablet (K-PHOS No. 2) 1 Tablet(s) Oral four times a day with meals  sodium chloride 0.9%. 1000 milliLiter(s) (50 mL/Hr) IV Continuous <Continuous>    MEDICATIONS  (PRN):  artificial  tears Solution 1 Drop(s) Both EYES three times a day PRN Dry Eyes      PHYSICAL EXAM:    Vital Signs Last 24 Hrs  T(C): 35.9 (15 Campos 2020 08:00), Max: 37.7 (2020 16:00)  T(F): 96.6 (15 Campos 2020 08:00), Max: 99.8 (2020 16:00)  HR: 101 (15 Campos 2020 11:15) (52 - 101)  BP: 162/83 (15 Campos 2020 11:15) (98/54 - 162/83)  BP(mean): 102 (15 Campos 2020 11:15) (63 - 103)  RR: 22 (15 Campos 2020 11:15) (13 - 29)  SpO2: 100% (15 Campos 2020 11:15) (98% - 100%)    General: alert  oriented, verbal, follow commands. +mildly anxious. No signs of distress.   Karnofsky Performance Score/Palliative Performance Status Version2: 50%    HEENT: normal    Lungs: comfortable, on room air. No signs of respiratory distress.   CV: S1S2, + tachycardia  GI: abdomen soft, nontender, nondistended  :   walters  Musculoskeletal: +generalized weakness, +2 bilateral  strength. Assist with ADLs  Skin: +loss of muscle mass, no BLE edema  Neuro: patient alert/oriented, verbal, follows commands, +mildly anxious  Oral intake ability: full capability  Diet: DASH/TLC    LABS:                        12.0   5.70  )-----------( 200      ( 15 Campos 2020 05:23 )             34.0     06-15    121<L>  |  87<L>  |  8   ----------------------------<  73  3.7   |  24  |  x     Ca    7.3<L>      15 Campos 2020 11:52  Phos  1.6     06-15  Mg     1.8     06-15    TPro  6.4  /  Alb  3.2<L>  /  TBili  0.8  /  DBili  x   /  AST  32  /  ALT  37  /  AlkPhos  68  06-15    Urinalysis Basic - ( 2020 20:23 )    Color: Yellow / Appearance: Clear / S.005 / pH: x  Gluc: x / Ketone: Negative  / Bili: Negative / Urobili: Negative   Blood: x / Protein: Negative / Nitrite: Negative   Leuk Esterase: Negative / RBC: 0-2 /HPF / WBC 0-2 /HPF   Sq Epi: x / Non Sq Epi: Occasional /HPF / Bacteria: Trace /HPF    Albumin: 3.2    RADIOLOGY & ADDITIONAL STUDIES:  < from: Xray Chest 1 View- PORTABLE-Urgent (06.15.20 @ 09:51) >    EXAM:  XR CHEST PORTABLE URGENT 1V                            PROCEDURE DATE:  06/15/2020          INTERPRETATION:    DATE OF STUDY: 6/15/2020    PRIOR:20    CLINICAL INDICATION: Assess for pulmonary edema.    TECHNIQUE: portable chest.    FINDINGS:   Thoracic aortic atheromatous changes and ectasia are stable  Heart is top normal in size.  Pulmonary vascularity is normal.  Interval improvement in previously seen bilateral lower lung infiltrates.  No pleural effusion or pneumothorax.  There are degenerative changes of the thoracic spine.    IMPRESSION:  Interval improvement in bilateral lower lung infiltrates c/w 20 exam.    < end of copied text >      ADVANCE DIRECTIVES: MOLST: DNR/DNI / no feeding tube / trial IV fluids / use abx.

## 2020-06-15 NOTE — DIETITIAN INITIAL EVALUATION ADULT. - OTHER INFO
Pt seen, reports some weight loss over 1 month from usual weight 112# @5'3. NKFA.  now noted at 114.6# with 1+ generalized edema. Not on Ensure before. Food preference for fish or chicken, softer food relayed to kitchen. Pt noted to have generalized weakness x 3 weeks PTA, decreased appetite.

## 2020-06-16 LAB
ALBUMIN SERPL ELPH-MCNC: 3.4 G/DL — LOW (ref 3.5–5)
ALP SERPL-CCNC: 71 U/L — SIGNIFICANT CHANGE UP (ref 40–120)
ALT FLD-CCNC: 37 U/L DA — SIGNIFICANT CHANGE UP (ref 10–60)
ANION GAP SERPL CALC-SCNC: 7 MMOL/L — SIGNIFICANT CHANGE UP (ref 5–17)
ANION GAP SERPL CALC-SCNC: 8 MMOL/L — SIGNIFICANT CHANGE UP (ref 5–17)
APPEARANCE UR: CLEAR — SIGNIFICANT CHANGE UP
AST SERPL-CCNC: 34 U/L — SIGNIFICANT CHANGE UP (ref 10–40)
BACTERIA # UR AUTO: ABNORMAL /HPF
BASOPHILS # BLD AUTO: 0.05 K/UL — SIGNIFICANT CHANGE UP (ref 0–0.2)
BASOPHILS NFR BLD AUTO: 0.8 % — SIGNIFICANT CHANGE UP (ref 0–2)
BILIRUB SERPL-MCNC: 0.7 MG/DL — SIGNIFICANT CHANGE UP (ref 0.2–1.2)
BILIRUB UR-MCNC: NEGATIVE — SIGNIFICANT CHANGE UP
BUN SERPL-MCNC: 11 MG/DL — SIGNIFICANT CHANGE UP (ref 7–18)
BUN SERPL-MCNC: 8 MG/DL — SIGNIFICANT CHANGE UP (ref 7–18)
CALCIUM SERPL-MCNC: 7.3 MG/DL — LOW (ref 8.4–10.5)
CALCIUM SERPL-MCNC: 7.7 MG/DL — LOW (ref 8.4–10.5)
CHLORIDE SERPL-SCNC: 95 MMOL/L — LOW (ref 96–108)
CHLORIDE SERPL-SCNC: 96 MMOL/L — SIGNIFICANT CHANGE UP (ref 96–108)
CO2 SERPL-SCNC: 26 MMOL/L — SIGNIFICANT CHANGE UP (ref 22–31)
CO2 SERPL-SCNC: 27 MMOL/L — SIGNIFICANT CHANGE UP (ref 22–31)
COLOR SPEC: YELLOW — SIGNIFICANT CHANGE UP
CREAT SERPL-MCNC: 0.52 MG/DL — SIGNIFICANT CHANGE UP (ref 0.5–1.3)
CREAT SERPL-MCNC: 0.58 MG/DL — SIGNIFICANT CHANGE UP (ref 0.5–1.3)
DIFF PNL FLD: ABNORMAL
EOSINOPHIL # BLD AUTO: 0.1 K/UL — SIGNIFICANT CHANGE UP (ref 0–0.5)
EOSINOPHIL NFR BLD AUTO: 1.6 % — SIGNIFICANT CHANGE UP (ref 0–6)
EPI CELLS # UR: ABNORMAL /HPF
GLUCOSE SERPL-MCNC: 85 MG/DL — SIGNIFICANT CHANGE UP (ref 70–99)
GLUCOSE SERPL-MCNC: 92 MG/DL — SIGNIFICANT CHANGE UP (ref 70–99)
GLUCOSE UR QL: 250
HCT VFR BLD CALC: 38.6 % — SIGNIFICANT CHANGE UP (ref 34.5–45)
HGB BLD-MCNC: 13.4 G/DL — SIGNIFICANT CHANGE UP (ref 11.5–15.5)
IMM GRANULOCYTES NFR BLD AUTO: 0.3 % — SIGNIFICANT CHANGE UP (ref 0–1.5)
KETONES UR-MCNC: ABNORMAL
LEUKOCYTE ESTERASE UR-ACNC: NEGATIVE — SIGNIFICANT CHANGE UP
LYMPHOCYTES # BLD AUTO: 1.91 K/UL — SIGNIFICANT CHANGE UP (ref 1–3.3)
LYMPHOCYTES # BLD AUTO: 29.7 % — SIGNIFICANT CHANGE UP (ref 13–44)
MAGNESIUM SERPL-MCNC: 1.9 MG/DL — SIGNIFICANT CHANGE UP (ref 1.6–2.6)
MCHC RBC-ENTMCNC: 29.1 PG — SIGNIFICANT CHANGE UP (ref 27–34)
MCHC RBC-ENTMCNC: 34.7 GM/DL — SIGNIFICANT CHANGE UP (ref 32–36)
MCV RBC AUTO: 83.9 FL — SIGNIFICANT CHANGE UP (ref 80–100)
MONOCYTES # BLD AUTO: 0.4 K/UL — SIGNIFICANT CHANGE UP (ref 0–0.9)
MONOCYTES NFR BLD AUTO: 6.2 % — SIGNIFICANT CHANGE UP (ref 2–14)
NEUTROPHILS # BLD AUTO: 3.95 K/UL — SIGNIFICANT CHANGE UP (ref 1.8–7.4)
NEUTROPHILS NFR BLD AUTO: 61.4 % — SIGNIFICANT CHANGE UP (ref 43–77)
NITRITE UR-MCNC: NEGATIVE — SIGNIFICANT CHANGE UP
NRBC # BLD: 0 /100 WBCS — SIGNIFICANT CHANGE UP (ref 0–0)
PH UR: 6 — SIGNIFICANT CHANGE UP (ref 5–8)
PHOSPHATE SERPL-MCNC: 1.7 MG/DL — LOW (ref 2.5–4.5)
PLATELET # BLD AUTO: 212 K/UL — SIGNIFICANT CHANGE UP (ref 150–400)
POTASSIUM SERPL-MCNC: 3.5 MMOL/L — SIGNIFICANT CHANGE UP (ref 3.5–5.3)
POTASSIUM SERPL-MCNC: 3.8 MMOL/L — SIGNIFICANT CHANGE UP (ref 3.5–5.3)
POTASSIUM SERPL-SCNC: 3.5 MMOL/L — SIGNIFICANT CHANGE UP (ref 3.5–5.3)
POTASSIUM SERPL-SCNC: 3.8 MMOL/L — SIGNIFICANT CHANGE UP (ref 3.5–5.3)
PROT SERPL-MCNC: 6.8 G/DL — SIGNIFICANT CHANGE UP (ref 6–8.3)
PROT UR-MCNC: 15
RBC # BLD: 4.6 M/UL — SIGNIFICANT CHANGE UP (ref 3.8–5.2)
RBC # FLD: 13.2 % — SIGNIFICANT CHANGE UP (ref 10.3–14.5)
RBC CASTS # UR COMP ASSIST: >50 /HPF (ref 0–2)
SODIUM SERPL-SCNC: 129 MMOL/L — LOW (ref 135–145)
SODIUM SERPL-SCNC: 130 MMOL/L — LOW (ref 135–145)
SP GR SPEC: 1.01 — SIGNIFICANT CHANGE UP (ref 1.01–1.02)
UROBILINOGEN FLD QL: NEGATIVE — SIGNIFICANT CHANGE UP
WBC # BLD: 6.43 K/UL — SIGNIFICANT CHANGE UP (ref 3.8–10.5)
WBC # FLD AUTO: 6.43 K/UL — SIGNIFICANT CHANGE UP (ref 3.8–10.5)
WBC UR QL: SIGNIFICANT CHANGE UP /HPF (ref 0–5)

## 2020-06-16 PROCEDURE — 71045 X-RAY EXAM CHEST 1 VIEW: CPT | Mod: 26

## 2020-06-16 RX ORDER — METOPROLOL TARTRATE 50 MG
12.5 TABLET ORAL
Refills: 0 | Status: DISCONTINUED | OUTPATIENT
Start: 2020-06-16 | End: 2020-06-18

## 2020-06-16 RX ADMIN — Medication 62.5 MILLIMOLE(S): at 14:52

## 2020-06-16 RX ADMIN — Medication 1 TABLET(S): at 21:25

## 2020-06-16 RX ADMIN — Medication 1 TABLET(S): at 17:28

## 2020-06-16 RX ADMIN — Medication 1 TABLET(S): at 07:52

## 2020-06-16 RX ADMIN — Medication 1 TABLET(S): at 11:56

## 2020-06-16 RX ADMIN — PANTOPRAZOLE SODIUM 40 MILLIGRAM(S): 20 TABLET, DELAYED RELEASE ORAL at 07:52

## 2020-06-16 RX ADMIN — CHLORHEXIDINE GLUCONATE 1 APPLICATION(S): 213 SOLUTION TOPICAL at 11:57

## 2020-06-16 RX ADMIN — CEFTRIAXONE 100 MILLIGRAM(S): 500 INJECTION, POWDER, FOR SOLUTION INTRAMUSCULAR; INTRAVENOUS at 07:53

## 2020-06-16 RX ADMIN — ENOXAPARIN SODIUM 40 MILLIGRAM(S): 100 INJECTION SUBCUTANEOUS at 11:56

## 2020-06-16 RX ADMIN — AZITHROMYCIN 255 MILLIGRAM(S): 500 TABLET, FILM COATED ORAL at 07:52

## 2020-06-16 RX ADMIN — Medication 12.5 MILLIGRAM(S): at 11:02

## 2020-06-16 RX ADMIN — Medication 12.5 MILLIGRAM(S): at 21:25

## 2020-06-16 RX ADMIN — Medication 50 MICROGRAM(S): at 05:12

## 2020-06-16 NOTE — PROGRESS NOTE ADULT - SUBJECTIVE AND OBJECTIVE BOX
INTERVAL HPI/OVERNIGHT EVENTS: ***    PRESSORS: [ ] YES [ ] NO  WHICH:    ANTIBIOTICS:                  DATE STARTED:  ANTIBIOTICS:                  DATE STARTED:  ANTIBIOTICS:                  DATE STARTED:    Antimicrobial:  azithromycin  IVPB 500 milliGRAM(s) IV Intermittent every 24 hours  cefTRIAXone   IVPB 1000 milliGRAM(s) IV Intermittent every 24 hours    Cardiovascular:  metoprolol tartrate 12.5 milliGRAM(s) Oral two times a day    Pulmonary:    Hematalogic:  enoxaparin Injectable 40 milliGRAM(s) SubCutaneous daily    Other:  artificial  tears Solution 1 Drop(s) Both EYES three times a day PRN  chlorhexidine 2% Cloths 1 Application(s) Topical daily  levothyroxine 50 MICROGram(s) Oral <User Schedule>  levothyroxine 75 MICROGram(s) Oral <User Schedule>  pantoprazole    Tablet 40 milliGRAM(s) Oral before breakfast  potassium acid phosphate/sodium acid phosphate tablet (K-PHOS No. 2) 1 Tablet(s) Oral four times a day with meals    artificial  tears Solution 1 Drop(s) Both EYES three times a day PRN  azithromycin  IVPB 500 milliGRAM(s) IV Intermittent every 24 hours  cefTRIAXone   IVPB 1000 milliGRAM(s) IV Intermittent every 24 hours  chlorhexidine 2% Cloths 1 Application(s) Topical daily  enoxaparin Injectable 40 milliGRAM(s) SubCutaneous daily  levothyroxine 50 MICROGram(s) Oral <User Schedule>  levothyroxine 75 MICROGram(s) Oral <User Schedule>  metoprolol tartrate 12.5 milliGRAM(s) Oral two times a day  pantoprazole    Tablet 40 milliGRAM(s) Oral before breakfast  potassium acid phosphate/sodium acid phosphate tablet (K-PHOS No. 2) 1 Tablet(s) Oral four times a day with meals    Drug Dosing Weight    Weight (kg): 50.8 (13 Jun 2020 13:38)    CENTRAL LINE: [ ] YES [ ] NO  LOCATION:         HO: [ ] YES [ ] NO          A-LINE:  [ ] YES [ ] NO  LOCATION:             ICU Vital Signs Last 24 Hrs  T(C): 36.1 (16 Jun 2020 05:29), Max: 36.4 (16 Jun 2020 00:21)  T(F): 96.9 (16 Jun 2020 05:29), Max: 97.6 (16 Jun 2020 00:21)  HR: 87 (16 Jun 2020 09:00) (66 - 103)  BP: 118/58 (16 Jun 2020 09:00) (91/66 - 175/109)  BP(mean): 73 (16 Jun 2020 09:00) (71 - 129)  ABP: --  ABP(mean): --  RR: 26 (16 Jun 2020 09:00) (12 - 28)  SpO2: 99% (16 Jun 2020 09:00) (90% - 100%)            06-15 @ 07:01  -  06-16 @ 07:00  --------------------------------------------------------  IN: 2870 mL / OUT: 4150 mL / NET: -1280 mL            REVIEW OF SYSTEMS:    CONSTITUTIONAL: No weakness, fevers or chills  NECK: No pain or stiffness  RESPIRATORY: No cough, wheezing, hemoptysis; No shortness of breath  CARDIOVASCULAR: No chest pain or palpitations  GASTROINTESTINAL: No abdominal or epigastric pain. No nausea, vomiting, No diarrhea or constipation. No melena or hematochezia.  GENITOURINARY: No dysuria, frequency or hematuria  NEUROLOGICAL: No numbness or weakness  All other review of systems is negative unless indicated above      PHYSICAL EXAM:    GENERAL: NAD, well-groomed, well-developed  EYES: EOMI, PERRLA,   NECK: Supple, No JVD; Normal thyroid; Trachea midline  NERVOUS SYSTEM:  Alert & Oriented X3,  Motor Strength 5/5 B/L upper and lower extremities; DTRs 2+ intact and symmetric  CHEST/LUNG: No rales, rhonchi, wheezing   HEART: Regular rate and rhythm; No murmurs,   ABDOMEN: Soft, Nontender, Nondistended; Bowel sounds present  EXTREMITIES:  2+ Peripheral Pulses, No clubbing, cyanosis, or edema        LABS:  CBC Full  -  ( 16 Jun 2020 05:14 )  WBC Count : 6.43 K/uL  RBC Count : 4.60 M/uL  Hemoglobin : 13.4 g/dL  Hematocrit : 38.6 %  Platelet Count - Automated : 212 K/uL  Mean Cell Volume : 83.9 fl  Mean Cell Hemoglobin : 29.1 pg  Mean Cell Hemoglobin Concentration : 34.7 gm/dL  Auto Neutrophil # : 3.95 K/uL  Auto Lymphocyte # : 1.91 K/uL  Auto Monocyte # : 0.40 K/uL  Auto Eosinophil # : 0.10 K/uL  Auto Basophil # : 0.05 K/uL  Auto Neutrophil % : 61.4 %  Auto Lymphocyte % : 29.7 %  Auto Monocyte % : 6.2 %  Auto Eosinophil % : 1.6 %  Auto Basophil % : 0.8 %    06-16    130<L>  |  96  |  8   ----------------------------<  92  3.5   |  26  |  0.52    Ca    7.7<L>      16 Jun 2020 05:14  Phos  1.7     06-16  Mg     1.9     06-16    TPro  6.8  /  Alb  3.4<L>  /  TBili  0.7  /  DBili  x   /  AST  34  /  ALT  37  /  AlkPhos  71  06-16        Culture Results:   No growth to date. (06-13 @ 22:10)  Culture Results:   No growth to date. (06-13 @ 22:06)      RADIOLOGY & ADDITIONAL STUDIES REVIEWED:  ***    [ ]GOALS OF CARE DISCUSSION WITH PATIENT/FAMILY/PROXY:    CRITICAL CARE TIME SPENT: 35 minutes INTERVAL HPI/OVERNIGHT EVENTS:   No acute event over night   aptient c/o SOB likely due to anxiety over night   CXR over night showed improvement in bilateral lower lobe infiltrates   Patient tachy over night , Bp on higher side , on Bystolic at home,     PRESSORS: [ ] YES [ ] NO  WHICH:    ANTIBIOTICS:                  DATE STARTED:  ANTIBIOTICS:                  DATE STARTED:  ANTIBIOTICS:                  DATE STARTED:    Antimicrobial:  azithromycin  IVPB 500 milliGRAM(s) IV Intermittent every 24 hours  cefTRIAXone   IVPB 1000 milliGRAM(s) IV Intermittent every 24 hours    Cardiovascular:  metoprolol tartrate 12.5 milliGRAM(s) Oral two times a day    Pulmonary:    Hematalogic:  enoxaparin Injectable 40 milliGRAM(s) SubCutaneous daily    Other:  artificial  tears Solution 1 Drop(s) Both EYES three times a day PRN  chlorhexidine 2% Cloths 1 Application(s) Topical daily  levothyroxine 50 MICROGram(s) Oral <User Schedule>  levothyroxine 75 MICROGram(s) Oral <User Schedule>  pantoprazole    Tablet 40 milliGRAM(s) Oral before breakfast  potassium acid phosphate/sodium acid phosphate tablet (K-PHOS No. 2) 1 Tablet(s) Oral four times a day with meals    artificial  tears Solution 1 Drop(s) Both EYES three times a day PRN  azithromycin  IVPB 500 milliGRAM(s) IV Intermittent every 24 hours  cefTRIAXone   IVPB 1000 milliGRAM(s) IV Intermittent every 24 hours  chlorhexidine 2% Cloths 1 Application(s) Topical daily  enoxaparin Injectable 40 milliGRAM(s) SubCutaneous daily  levothyroxine 50 MICROGram(s) Oral <User Schedule>  levothyroxine 75 MICROGram(s) Oral <User Schedule>  metoprolol tartrate 12.5 milliGRAM(s) Oral two times a day  pantoprazole    Tablet 40 milliGRAM(s) Oral before breakfast  potassium acid phosphate/sodium acid phosphate tablet (K-PHOS No. 2) 1 Tablet(s) Oral four times a day with meals    Drug Dosing Weight    Weight (kg): 50.8 (13 Jun 2020 13:38)    CENTRAL LINE: [ ] YES [ ] NO  LOCATION:         HO: [ ] YES [ ] NO          A-LINE:  [ ] YES [ ] NO  LOCATION:             ICU Vital Signs Last 24 Hrs  T(C): 36.1 (16 Jun 2020 05:29), Max: 36.4 (16 Jun 2020 00:21)  T(F): 96.9 (16 Jun 2020 05:29), Max: 97.6 (16 Jun 2020 00:21)  HR: 87 (16 Jun 2020 09:00) (66 - 103)  BP: 118/58 (16 Jun 2020 09:00) (91/66 - 175/109)  BP(mean): 73 (16 Jun 2020 09:00) (71 - 129)  ABP: --  ABP(mean): --  RR: 26 (16 Jun 2020 09:00) (12 - 28)  SpO2: 99% (16 Jun 2020 09:00) (90% - 100%)            06-15 @ 07:01  -  06-16 @ 07:00  --------------------------------------------------------  IN: 2870 mL / OUT: 4150 mL / NET: -1280 mL            REVIEW OF SYSTEMS:    CONSTITUTIONAL: No weakness, fevers or chills  NECK: No pain or stiffness  RESPIRATORY: No cough, wheezing, hemoptysis; No shortness of breath  CARDIOVASCULAR: No chest pain or palpitations  GASTROINTESTINAL: No abdominal or epigastric pain. No nausea, vomiting, No diarrhea or constipation. No melena or hematochezia.  GENITOURINARY: No dysuria, frequency or hematuria  NEUROLOGICAL: No numbness or weakness  All other review of systems is negative unless indicated above      PHYSICAL EXAM:    GENERAL: NAD, well-groomed, well-developed  EYES: EOMI, PERRLA,   NECK: Supple, No JVD; Normal thyroid; Trachea midline  NERVOUS SYSTEM:  Alert & Oriented X3,  Motor Strength 5/5 B/L upper and lower extremities; DTRs 2+ intact and symmetric  CHEST/LUNG: No rales, rhonchi, wheezing   HEART: Regular rate and rhythm; No murmurs,   ABDOMEN: Soft, Nontender, Nondistended; Bowel sounds present  EXTREMITIES:  2+ Peripheral Pulses, No clubbing, cyanosis, or edema        LABS:  CBC Full  -  ( 16 Jun 2020 05:14 )  WBC Count : 6.43 K/uL  RBC Count : 4.60 M/uL  Hemoglobin : 13.4 g/dL  Hematocrit : 38.6 %  Platelet Count - Automated : 212 K/uL  Mean Cell Volume : 83.9 fl  Mean Cell Hemoglobin : 29.1 pg  Mean Cell Hemoglobin Concentration : 34.7 gm/dL  Auto Neutrophil # : 3.95 K/uL  Auto Lymphocyte # : 1.91 K/uL  Auto Monocyte # : 0.40 K/uL  Auto Eosinophil # : 0.10 K/uL  Auto Basophil # : 0.05 K/uL  Auto Neutrophil % : 61.4 %  Auto Lymphocyte % : 29.7 %  Auto Monocyte % : 6.2 %  Auto Eosinophil % : 1.6 %  Auto Basophil % : 0.8 %    06-16    130<L>  |  96  |  8   ----------------------------<  92  3.5   |  26  |  0.52    Ca    7.7<L>      16 Jun 2020 05:14  Phos  1.7     06-16  Mg     1.9     06-16    TPro  6.8  /  Alb  3.4<L>  /  TBili  0.7  /  DBili  x   /  AST  34  /  ALT  37  /  AlkPhos  71  06-16        Culture Results:   No growth to date. (06-13 @ 22:10)  Culture Results:   No growth to date. (06-13 @ 22:06)      RADIOLOGY & ADDITIONAL STUDIES REVIEWED:  ***    [ ]GOALS OF CARE DISCUSSION WITH PATIENT/FAMILY/PROXY:    CRITICAL CARE TIME SPENT: 35 minutes

## 2020-06-16 NOTE — PROGRESS NOTE ADULT - ASSESSMENT
Hyponatremia due to Diuretics and increased fluid intake  Good response to Desmopressin , increased osmolality.  Hypo po4 , reduced po intake, r/o vitamin D deficiency.  Correct PO4, follow Vitamin D levels  Repeat UA . Hyponatremia due to Diuretics and increased fluid intake  Good response to Desmopressin , increased osmolality.  Most likely cause of hyponatremia is diuretics .  Hypo po4 , reduced po intake, r/o vitamin D deficiency.  Correct PO4, follow Vitamin D levels  Repeat UA .

## 2020-06-16 NOTE — PROGRESS NOTE ADULT - SUBJECTIVE AND OBJECTIVE BOX
Patient is a 85y old  Female who presents with a chief complaint of hyponatremia (15 Campos 2020 12:31)    pt seen in icu [x  ], reg med floor [   ], bed [x  ], chair at bedside [   ], a+o x3 [ x ], lethargic [  ],  nad [x  ]    walters [ x ],      Allergies    No Known Allergies        Vitals    T(F): 96.9 (20 @ 05:29), Max: 97.6 (20 @ 00:21)  HR: 102 (20 @ 06:15) (63 - 103)  BP: 159/121 (20 @ 06:15) (91/66 - 175/109)  RR: 16 (20 @ 06:15) (12 - 29)  SpO2: 100% (20 @ 06:15) (90% - 100%)  Wt(kg): --  CAPILLARY BLOOD GLUCOSE          Labs                          13.4   6.43  )-----------( 212      ( 2020 05:14 )             38.6       -16    130<L>  |  96  |  8   ----------------------------<  92  3.5   |  26  |  0.52    Ca    7.7<L>      2020 05:14  Phos  1.7       Mg     1.9     16    TPro  6.8  /  Alb  3.4<L>  /  TBili  0.7  /  DBili  x   /  AST  34  /  ALT  37  /  AlkPhos  71  -16            .Blood Blood   @ 22:10   No growth to date.  --  --      .Blood Blood   @ 22:06   No growth to date.  --  --          Radiology Results      Meds    MEDICATIONS  (STANDING):  azithromycin  IVPB 500 milliGRAM(s) IV Intermittent every 24 hours  cefTRIAXone   IVPB 1000 milliGRAM(s) IV Intermittent every 24 hours  chlorhexidine 2% Cloths 1 Application(s) Topical daily  enoxaparin Injectable 40 milliGRAM(s) SubCutaneous daily  levothyroxine 50 MICROGram(s) Oral <User Schedule>  levothyroxine 75 MICROGram(s) Oral <User Schedule>  pantoprazole    Tablet 40 milliGRAM(s) Oral before breakfast  potassium acid phosphate/sodium acid phosphate tablet (K-PHOS No. 2) 1 Tablet(s) Oral four times a day with meals  sodium chloride 0.9%. 1000 milliLiter(s) (50 mL/Hr) IV Continuous <Continuous>      MEDICATIONS  (PRN):  artificial  tears Solution 1 Drop(s) Both EYES three times a day PRN Dry Eyes      Physical Exam    Neuro :  no focal deficits  Respiratory: CTA B/L  CV: RRR, S1S2, no murmurs,   Abdominal: Soft, NT, ND +BS,  Extremities: No edema, + peripheral pulses    ASSESSMENT    Hyponatremia, hypo-osmolarity, or hypo-osmolar hyponatremia   r/o pna  r/o covid-19  h/o HTN (hypertension)  S/P  section      PLAN        hold HCTZ,   cont losartan and bystolic   s/p NS 500cc  s/p DDAVP and D5 overnight  renal f/u  serum na improving   continue with NS 50 ML per hour till effect of desmopressin fades  cxr with Probable small infiltrates off both lower sergey left greater than right noted above  cont rocephin and zithromax  blood cx neg noted above  pulm f/u  covid test neg noted   contact and airborne isolation d/c'd  cont albuterol inhaler   cont O2 via nasal  as needed   pt DNR/DNI/no invasive procedure  cont current meds  mgmt as per icu Patient is a 85y old  Female who presents with a chief complaint of hyponatremia (15 Campos 2020 12:31)    pt seen in icu [x  ], reg med floor [   ], bed [x  ], chair at bedside [   ], a+o x3 [ x ], lethargic [  ],  nad [x  ]    walters [ x ],      Allergies    No Known Allergies        Vitals    T(F): 96.9 (20 @ 05:29), Max: 97.6 (20 @ 00:21)  HR: 102 (20 @ 06:15) (63 - 103)  BP: 159/121 (20 @ 06:15) (91/66 - 175/109)  RR: 16 (20 @ 06:15) (12 - 29)  SpO2: 100% (20 @ 06:15) (90% - 100%)  Wt(kg): --  CAPILLARY BLOOD GLUCOSE          Labs                          13.4   6.43  )-----------( 212      ( 2020 05:14 )             38.6       -16    130<L>  |  96  |  8   ----------------------------<  92  3.5   |  26  |  0.52    Ca    7.7<L>      2020 05:14  Phos  1.7     -  Mg     1.9     16    TPro  6.8  /  Alb  3.4<L>  /  TBili  0.7  /  DBili  x   /  AST  34  /  ALT  37  /  AlkPhos  71  -16            .Blood Blood   @ 22:10   No growth to date.  --  --      .Blood Blood   @ 22:06   No growth to date.  --  --          Radiology Results      < from: Xray Chest 1 View- PORTABLE-Urgent (06.15.20 @ 09:51) >  IMPRESSION:  Interval improvement in bilateral lower lung infiltrates c/w 20 exam.    < end of copied text >      Meds    MEDICATIONS  (STANDING):  azithromycin  IVPB 500 milliGRAM(s) IV Intermittent every 24 hours  cefTRIAXone   IVPB 1000 milliGRAM(s) IV Intermittent every 24 hours  chlorhexidine 2% Cloths 1 Application(s) Topical daily  enoxaparin Injectable 40 milliGRAM(s) SubCutaneous daily  levothyroxine 50 MICROGram(s) Oral <User Schedule>  levothyroxine 75 MICROGram(s) Oral <User Schedule>  pantoprazole    Tablet 40 milliGRAM(s) Oral before breakfast  potassium acid phosphate/sodium acid phosphate tablet (K-PHOS No. 2) 1 Tablet(s) Oral four times a day with meals  sodium chloride 0.9%. 1000 milliLiter(s) (50 mL/Hr) IV Continuous <Continuous>      MEDICATIONS  (PRN):  artificial  tears Solution 1 Drop(s) Both EYES three times a day PRN Dry Eyes      Physical Exam    Neuro :  no focal deficits  Respiratory: CTA B/L  CV: RRR, S1S2, no murmurs,   Abdominal: Soft, NT, ND +BS,  Extremities: No edema, + peripheral pulses    ASSESSMENT    Hyponatremia, hypo-osmolarity, or hypo-osmolar hyponatremia   r/o pna  r/o covid-19  h/o HTN (hypertension)  S/P  section      PLAN        hold HCTZ,   cont losartan and bystolic   s/p NS 500cc  s/p DDAVP and D5 overnight  renal f/u  serum na improving   can d/c NS 50 ML per hour   cxr with Probable small infiltrates off both lower sergey left greater than right noted   rept cxr with Interval improvement in bilateral lower lung infiltrates c/w 20 exam noted above.     cont rocephin and zithromax  blood cx neg noted above  pulm f/u  covid test neg noted   contact and airborne isolation d/c'd  cont albuterol inhaler   cont O2 via nasal  as needed   pt DNR/DNI/no invasive procedure  cont current meds  mgmt as per icu

## 2020-06-16 NOTE — PROGRESS NOTE ADULT - SUBJECTIVE AND OBJECTIVE BOX
Patient is a 85y old  Female who presents with a chief complaint of hyponatremia (16 Jun 2020 06:37)  Awake, alert, comfortable in bed in NAD. Feeling a bit sob.  INTERVAL HPI/OVERNIGHT EVENTS:      VITAL SIGNS:  T(F): 96.9 (06-16-20 @ 05:29)  HR: 80 (06-16-20 @ 08:00)  BP: 127/68 (06-16-20 @ 08:00)  RR: 15 (06-16-20 @ 08:00)  SpO2: 98% (06-16-20 @ 08:00)  Wt(kg): --  I&O's Detail    15 Campos 2020 07:01  -  16 Jun 2020 07:00  --------------------------------------------------------  IN:    Oral Fluid: 1320 mL    sodium chloride 0.9%: 200 mL    sodium chloride 0.9%: 1050 mL    Solution: 50 mL    Solution: 250 mL  Total IN: 2870 mL    OUT:    Ureteral Catheter: 4150 mL  Total OUT: 4150 mL    Total NET: -1280 mL      16 Jun 2020 07:01  -  16 Jun 2020 09:39  --------------------------------------------------------  IN:    Oral Fluid: 300 mL    Solution: 250 mL  Total IN: 550 mL    OUT:  Total OUT: 0 mL    Total NET: 550 mL              REVIEW OF SYSTEMS:    CONSTITUTIONAL:  No fevers, chills, sweats    HEENT:  Eyes:  No diplopia or blurred vision. ENT:  No earache, sore throat or runny nose.    CARDIOVASCULAR:  No pressure, squeezing, tightness, or heaviness about the chest; no palpitations.    RESPIRATORY:  Per HPI    GASTROINTESTINAL:  No abdominal pain, nausea, vomiting or diarrhea.    GENITOURINARY:  No dysuria, frequency or urgency.    NEUROLOGIC:  No paresthesias, fasciculations, seizures or weakness.    PSYCHIATRIC:  No disorder of thought or mood.      PHYSICAL EXAM:    Constitutional: Well developed and nourished  Eyes:Perrla  ENMT: normal  Neck:supple  Respiratory: Wheezes on right posteriroly  Cardiovascular: S1 S2 regular  Gastrointestinal: Soft, Non tender  Extremities: No edema  Vascular:normal  Neurological:Awake, alert,Ox3  Musculoskeletal:Normal      MEDICATIONS  (STANDING):  azithromycin  IVPB 500 milliGRAM(s) IV Intermittent every 24 hours  cefTRIAXone   IVPB 1000 milliGRAM(s) IV Intermittent every 24 hours  chlorhexidine 2% Cloths 1 Application(s) Topical daily  enoxaparin Injectable 40 milliGRAM(s) SubCutaneous daily  levothyroxine 50 MICROGram(s) Oral <User Schedule>  levothyroxine 75 MICROGram(s) Oral <User Schedule>  metoprolol tartrate 12.5 milliGRAM(s) Oral two times a day  pantoprazole    Tablet 40 milliGRAM(s) Oral before breakfast  potassium acid phosphate/sodium acid phosphate tablet (K-PHOS No. 2) 1 Tablet(s) Oral four times a day with meals    MEDICATIONS  (PRN):  artificial  tears Solution 1 Drop(s) Both EYES three times a day PRN Dry Eyes      Allergies    No Known Allergies    Intolerances        LABS:                        13.4   6.43  )-----------( 212      ( 16 Jun 2020 05:14 )             38.6     06-16    130<L>  |  96  |  8   ----------------------------<  92  3.5   |  26  |  0.52    Ca    7.7<L>      16 Jun 2020 05:14  Phos  1.7     06-16  Mg     1.9     06-16    TPro  6.8  /  Alb  3.4<L>  /  TBili  0.7  /  DBili  x   /  AST  34  /  ALT  37  /  AlkPhos  71  06-16              CAPILLARY BLOOD GLUCOSE        pro-bnp -- 06-13 @ 16:46     d-dimer 179  06-13 @ 16:46  pro-bnp 505 06-13 @ 14:13     d-dimer --  06-13 @ 14:13      RADIOLOGY & ADDITIONAL TESTS:    CXR:  < from: Xray Chest 1 View- PORTABLE-Urgent (06.16.20 @ 07:55) >  FINDINGS: Heart size appears within normal limits. No hilar or superior mediastinal abnormalities are identified. Bilateral lower lobe infiltrates noted on 6/13/2020 has significantly improved with minimal residual opacity identified bilaterally.There is no evidence for interval development of lobar consolidation. No pleural effusion or pneumothorax is demonstrated. No mediastinal shift noted. No osseous fractures identified.    IMPRESSION: As above.    < end of copied text >    Ct scan chest:    ekg;    echo:

## 2020-06-16 NOTE — PROGRESS NOTE ADULT - ATTENDING COMMENTS
84 yo F from home, AAOX3, ambulates with cane with PMH of HTN and hypothyroidism BIBA to ED c/o feeling fatigue and generalized weakness for past 3 weeks, MADDEN and decreased appetite. Found Na 113, and CXR noted b/l lower lobes infiltrates. Admit to ICU for Hyponatremia and PNA       Problems:   - Hyponatremia Symptomatic   - PNA,   - COVID-19  need to be r/o  - HTN  - Hypothyroidism       Plan :  -isolate: contact and airborne Covid-19  x 2 neg  -Na improving   -Na correction 12 mEq/24 hr  -s/p DDAVP for over correction of Na, now on D5w  -avoid over correction   -thiazide held  -BMP q4h   -hyponatremia probable due to thiazide diuretics  -neph eval noted  -blood pressure control .
86 yo F from home, AAOX3, ambulates with cane with PMH of HTN and hypothyroidism BIBA to ED c/o feeling fatigue and generalized weakness for past 3 weeks, MADDEN and decreased appetite. Found Na 113, and CXR noted b/l lower lobes infiltrates. Admit to ICU for Hyponatremia and PNA       Problems:   - Hyponatremia Symptomatic   - PNA,   - COVID-19  need to be r/o  - HTN  - Hypothyroidism       Plan :  -isolate: contact and airborne Covid-19 #1 neg, #2 pending   -hyponatremia work up  -Na correction 12 mEq/24 hr  -s/p DDAVP for over correction of Na, now on D5w  -avoid over correction   -thiazide held  -BMP q4h   -hyponatremia probable due to thiazide diuretics  -neph eval noted  -blood pressure control .
84 yo F from home, AAOX3, ambulates with cane with PMH of HTN and hypothyroidism BIBA to ED c/o feeling fatigue and generalized weakness for past 3 weeks, MADDEN and decreased appetite. Found Na 113, and CXR noted b/l lower lobes infiltrates. Admit to ICU for Hyponatremia and PNA       Problems:   - Hyponatremia Symptomatic   - PNA,   - COVID-19  need to be r/o  - HTN  - Hypothyroidism       Plan :  -isolate: contact and airborne Covid-19 #1 neg, #2 pending   -hyponatremia work up  -Na correction 12 mEq/24 hr  -s/p DDAVP for over correction of Na, now on D5w  -avoid over correction   -thiazide held  -BMP q4h   -hyponatremia probable due to thiazide diuretics  -neph eval pending   -blood pressure control .

## 2020-06-16 NOTE — PROGRESS NOTE ADULT - ASSESSMENT
84 yo F from home, AAOX3, ambulates with cane with PMH of HTN and hypothyroidism BIBA to ED c/o feeling fatigue and generalized weakness for past 3 weeks, MADDEN and decreased appetite. Found Na 113, and CXR noted b/l lower lobes infiltrates. Admit to ICU for Hyponatremia and PNA       Problems:   -Hyponatremia   -PNA, r/o COVID   -HTN  -Hypothyroidism     Plan:  Neuro:   AAOX3, no issue     Respi:   Saturation 97% on RA , comfortable, no sob/work of breath   CXR : small infiltrate of both lower sergey L>R    CVS:   HTN  -hold HCTZ, resume losartan and bystolic   ECG: NSR, incomplete RBBB    GI:   Dash/fluid restriction diet    Heme:   no issue     Endo:   Hypothyroidism  -c/w synthroid home dose   - TSH 2.48   keep FS    Hb A1c 5.5    ID:  PNA  -CXR noted b/l lower lobes infiltrates.   -c/w zithromax and rocephin   - BCx NGTD and  COVID negX2      Renal:   Hyponatremia 2/2 diuretics vs SIADH   -Na on admission 113   Na improving 113>116>121/122 in first 12 hours   s/p DDAVP and D5   - currently on IVF 50cc/hr  -BMP q4hrs and Urine Na/K/Osmo q4hrs   -Nephro consulted Dr. Gupta   - Patient refused Christy removal , will re assess tomorrow  - U/o ; 150 c/hr      PPx: on dvt and gi ppx    GOC: DNR/DNI/no invasive procedure.

## 2020-06-16 NOTE — PROGRESS NOTE ADULT - SUBJECTIVE AND OBJECTIVE BOX
Problem List:  Hyponatremia    PAST MEDICAL & SURGICAL HISTORY:  HTN (hypertension)  S/P  section      No Known Allergies      MEDICATIONS  (STANDING):  azithromycin  IVPB 500 milliGRAM(s) IV Intermittent every 24 hours  cefTRIAXone   IVPB 1000 milliGRAM(s) IV Intermittent every 24 hours  chlorhexidine 2% Cloths 1 Application(s) Topical daily  enoxaparin Injectable 40 milliGRAM(s) SubCutaneous daily  levothyroxine 50 MICROGram(s) Oral <User Schedule>  levothyroxine 75 MICROGram(s) Oral <User Schedule>  metoprolol tartrate 12.5 milliGRAM(s) Oral two times a day  pantoprazole    Tablet 40 milliGRAM(s) Oral before breakfast  potassium acid phosphate/sodium acid phosphate tablet (K-PHOS No. 2) 1 Tablet(s) Oral four times a day with meals  sodium phosphate IVPB 15 milliMole(s) IV Intermittent once    MEDICATIONS  (PRN):  artificial  tears Solution 1 Drop(s) Both EYES three times a day PRN Dry Eyes                            13.4   6.43  )-----------( 212      ( 2020 05:14 )             38.6     06-16    130<L>  |  96  |  8   ----------------------------<  92  3.5   |  26  |  0.52    Ca    7.7<L>      2020 05:14  Phos  1.7     06-16  Mg     1.9     -16    TPro  6.8  /  Alb  3.4<L>  /  TBili  0.7  /  DBili  x   /  AST  34  /  ALT  37  /  AlkPhos  71  06-16        Sodium, Random Urine: 29 mmol/L (06-15 @ 17:35)  Osmolality, Random Urine: 170 mos/kg (06-15 @ 17:35)  Creatinine, Random Urine: 17 mg/dL (06-15 @ 17:35)  Chloride, Random Urine: 31 mmol/L (06-15 @ 17:35)  Potassium, Random Urine: 13 mmol/L (06-15 @ 17:35)  Potassium, Random Urine: 13 mmol/L (06-15 @ 17:35)  Osmolality, Random Urine: 158 mos/kg (06-15 @ 11:51)  Sodium, Random Urine: 46 mmol/L (06-15 @ 11:51)  Osmolality, Random Urine: 389 mos/kg ( @ 18:30)  Sodium, Random Urine: 64 mmol/L ( @ 18:30)  Osmolality, Random Urine: 372 mos/kg ( @ 12:46)  Sodium, Random Urine: 87 mmol/L ( @ 12:45)  Creatinine, Random Urine: 21 mg/dL ( @ 12:45)  Sodium, Random Urine: 87 mmol/L ( @ 08:40)  Potassium, Random Urine: 41 mmol/L ( @ 08:40)  Osmolality, Random Urine: 358 mos/kg ( @ 08:40)  Sodium, Random Urine: 66 mmol/L ( @ 01:17)  Potassium, Random Urine: 58 mmol/L ( @ 01:17)  Osmolality, Random Urine: 373 mos/kg ( @ 01:17)  Sodium, Random Urine: 19 mmol/L ( @ 20:23)  Osmolality, Random Urine: 109 mos/kg ( @ 20:23)  Potassium, Random Urine: 8 mmol/L ( @ 20:23)  Potassium, Random Urine: 7 mmol/L ( @ 17:33)  Osmolality, Random Urine: 110 mos/kg ( @ 17:33)  Sodium, Random Urine: 17 mmol/L ( @ 17:27)  Creatinine, Random Urine: <13 mg/dL ( @ 17:27)  Chloride, Random Urine: 13 mmol/L ( 17:27)      REVIEW OF SYSTEMS:  General: no fever no chills, no weight loss.  RESPIRATORY: No cough, wheezing, hemoptysis; No shortness of breath  CARDIOVASCULAR: No chest pain or palpitations. No Edema  GASTROINTESTINAL: No abdominal or epigastric pain. No nausea, vomiting. No diarrhea or constipation. No melena.  GENITOURINARY: No dysuria, frequency, foamy urine, urinary urgency, incontinence or hematuria  NEUROLOGICAL: No numbness or weakness, no tremor , no dizziness.   Muscle skeletal : no joint pain and no swelling of joints and limbs.  SKIN: No itching, burning, rashes.        VITALS:  T(F): 97.7 (20 @ 12:00), Max: 97.7 (20 @ 12:00)  HR: 86 (20 @ 12:00)  BP: 162/62 (20 @ 12:00)  RR: 16 (20 @ 12:00)  SpO2: 97% (20 @ 12:00)  Wt(kg): --    06-15 @ 07:01  -   @ 07:00  --------------------------------------------------------  IN: 2870 mL / OUT: 4150 mL / NET: -1280 mL     @ 07:01  -   @ 14:07  --------------------------------------------------------  IN: 800 mL / OUT: 450 mL / NET: 350 mL        PHYSICAL EXAM:  Constitutional: well developed, no diaphoresis, no distress.  Neck: No JVD, no carotid bruit, supple, no adenopathy  Respiratory: Good air entrance B/L, no wheezes, rales or rhonchi  Cardiovascular: S1, S2, RRR, no pericardial rub, no murmur  Abdomen: BS+, soft, no tenderness, no bruit  Pelvis: bladder nondistended  Extremities: No cyanosis or clubbing. No peripheral edema.   Pulses: All present

## 2020-06-16 NOTE — CHART NOTE - NSCHARTNOTEFT_GEN_A_CORE
84 yo F from home, AAOX3, ambulates with cane with PMH of HTN and hypothyroidism BIBA to ED c/o feeling fatigue and generalized weakness for past 3 weeks, MADDEN and decreased appetite. Pt checked her B/P, was high 190/80.  Pt denies fever, CP, edema, palpitations, coughing, dizziness, orthopnea, abd pain, n/v/c/d. Last BM this am.   ED course, vitals noted temp 97, HR 70, /95, RR 18, SO2 97% on RA. Labs noted Na 113,  CXR noted b/l lower lobes infiltrates. s/p zithromax and rocephin in ED.   Patient Admitted to ICU for severe Hyponatremia and PNA. Hyponatremia 2/2 diuretics vs SIADH   -Na 113  -will give NS 500cc  -f/u urine lytes, urine osmolarity and serum osmolarity   -BMP q4hrs and Urine Na/K/Osmo q4hrs   -Nephro consulted Dr. Gupta           Problems:   -Hyponatremia   -PNA, r/o COVID   -HTN  -Hypothyroidism     Plan:  Neuro:   AAOX3, no issue     Respi:   Saturation 97% on RA , comfortable, no sob/work of breath   CXR : small infiltrate of both lower sergey L>R    CVS:   HTN  -hold HCTZ, resume losartan and bystolic   ECG: NSR, incomplete RBBB    GI:   Dash/fluid restriction diet    Heme:   no issue     Endo:   Hypothyroidism  -c/w synthroid home dose   - TSH 2.48   keep FS    Hb A1c 5.5    ID:  PNA  -CXR noted b/l lower lobes infiltrates.   -c/w zithromax and rocephin   - BCx NGTD and  COVID negX2      Renal:   Hyponatremia 2/2 diuretics vs SIADH   -Na on admission 113   Na improving 113>116>121/122 in first 12 hours   s/p DDAVP and D5   - currently on IVF 50cc/hr  -BMP q4hrs and Urine Na/K/Osmo q4hrs   -Nephro consulted Dr. Gupta   - Patient refused Christy removal , will re assess tomorrow  - U/o ; 150 c/hr 84 yo F from home, AAOX3, ambulates with cane with PMH of HTN and hypothyroidism BIBA to ED c/o feeling fatigue and generalized weakness for past 3 weeks, MADDEN and decreased appetite. Pt checked her B/P, was high 190/80.  Pt denies fever, CP, edema, palpitations, coughing, dizziness, orthopnea, abd pain, n/v/c/d. Last BM this am.   ED course, vitals noted temp 97, HR 70, /95, RR 18, SO2 97% on RA. Labs noted Na 113,  CXR noted b/l lower lobes infiltrates. s/p Zithromax and Rocephin in ED.   Patient Admitted to ICU for severe Hyponatremia and PNA.  Patient received 500 cc of normal saline, patient was seen by nephrologist. Na overcorrected in the first 6 hours from 113 to 12.  Patient received  desmopressin treatment due to rapid increased in sodium level, subsequent hyponatremia as side effect of Desmopressin followed, patient received  per hour which tapered to 50cc per hour. Na trended up to 130. Symptoms and sign were improved. Patient had episode of panic attack over night complaining of SOB, normal O2 saturation however patient had tachypnea and tachycardia, f/u CXR was taken yielded significant improvement in bilateral lower lobe infiltrates noted on 6/13/2020 with minimal residual opacity identified bilaterally. Home Bp medication were held on admission. Metoprolol 12.5 BID started for tachycardia and BP on higher side. Per nephrology hyponatremia is most likely 2/2 HCTZ and Losartan vs  salt loosing nephropathy. Fluids were held and fluid restriction dc'd. Nurse notified that to have  patient receive po fluids 2liters per day minimum. Patient tested negative for COVID twice. For hypothyroidism TSH was checked found to be WNL ,home dose synthroid resumed. For possible PNA patient was treated with Zithromax and Rocephin with significant improvement in CXR and clinics. Christy removed.     Patient is stable for downgrade to floor under care of Dr. Perez for further management , covering resident Dr. LAWLER was informed.    Things to follow up :  1. Follow CBC and BMP daily.  2. Complete ABX for PNA  3. Monitor I&Os  4. Monitor PO Fluid intake  4. f/u with nephrology Dr Gupta  5. f/u TOV 84 yo F from home, AAOX3, ambulates with cane with PMH of HTN and hypothyroidism BIBA to ED c/o feeling fatigue and generalized weakness for past 3 weeks, MADDEN and decreased appetite. Pt checked her B/P, was high 190/80.  Pt denies fever, CP, edema, palpitations, coughing, dizziness, orthopnea, abd pain, n/v/c/d. Last BM this am.   ED course, vitals noted temp 97, HR 70, /95, RR 18, SO2 97% on RA. Labs noted Na 113,  CXR noted b/l lower lobes infiltrates. s/p Zithromax and Rocephin in ED.   Patient Admitted to ICU for severe Hyponatremia and PNA.  Patient received 500 cc of normal saline, patient was seen by nephrologist. Na overcorrected in the first 6 hours from 113 to 12.  Patient received  desmopressin treatment due to rapid increased in sodium level, subsequent hyponatremia as side effect of Desmopressin followed, patient received  per hour which tapered to 50cc per hour. Na trended up to 130. Symptoms and sign were improved. Patient had episode of panic attack over night complaining of SOB, normal O2 saturation however patient had tachypnea and tachycardia, f/u CXR was taken yielded significant improvement in bilateral lower lobe infiltrates noted on 6/13/2020 with minimal residual opacity identified bilaterally. Home Bp medication were held on admission. Metoprolol 12.5 BID started for tachycardia and BP on higher side. Per nephrology hyponatremia is most likely 2/2 HCTZ and Losartan vs  salt loosing nephropathy. Fluids were held and fluid restriction dc'd. Nurse notified that to have  patient receive po fluids 2liters per day minimum. Patient tested negative for COVID twice. For hypothyroidism TSH was checked found to be WNL ,home dose synthroid resumed. For possible PNA patient was treated with Zithromax and Rocephin with significant improvement in CXR and clinics. Christy removed.     Patient is stable for downgrade to floor under care of Dr. Perez for further management , covering resident NP Erin was informed.    Things to follow up :  1. Follow CBC and BMP daily.  2. Complete ABX for PNA  3. Monitor I&Os  4. Monitor PO Fluid intake  4. f/u with nephrology Dr Gupta  5. f/u TOV

## 2020-06-17 LAB
24R-OH-CALCIDIOL SERPL-MCNC: 63.9 NG/ML — SIGNIFICANT CHANGE UP (ref 30–80)
ANION GAP SERPL CALC-SCNC: 10 MMOL/L — SIGNIFICANT CHANGE UP (ref 5–17)
BUN SERPL-MCNC: 7 MG/DL — SIGNIFICANT CHANGE UP (ref 7–18)
CALCIUM SERPL-MCNC: 7.3 MG/DL — LOW (ref 8.4–10.5)
CHLORIDE SERPL-SCNC: 95 MMOL/L — LOW (ref 96–108)
CO2 SERPL-SCNC: 23 MMOL/L — SIGNIFICANT CHANGE UP (ref 22–31)
CREAT SERPL-MCNC: 0.5 MG/DL — SIGNIFICANT CHANGE UP (ref 0.5–1.3)
GLUCOSE SERPL-MCNC: 93 MG/DL — SIGNIFICANT CHANGE UP (ref 70–99)
HCT VFR BLD CALC: 33.6 % — LOW (ref 34.5–45)
HGB BLD-MCNC: 11.8 G/DL — SIGNIFICANT CHANGE UP (ref 11.5–15.5)
MAGNESIUM SERPL-MCNC: 1.7 MG/DL — SIGNIFICANT CHANGE UP (ref 1.6–2.6)
MCHC RBC-ENTMCNC: 29.1 PG — SIGNIFICANT CHANGE UP (ref 27–34)
MCHC RBC-ENTMCNC: 35.1 GM/DL — SIGNIFICANT CHANGE UP (ref 32–36)
MCV RBC AUTO: 83 FL — SIGNIFICANT CHANGE UP (ref 80–100)
NRBC # BLD: 0 /100 WBCS — SIGNIFICANT CHANGE UP (ref 0–0)
OSMOLALITY UR: 506 MOS/KG — SIGNIFICANT CHANGE UP (ref 50–1200)
PHOSPHATE SERPL-MCNC: 2.5 MG/DL — SIGNIFICANT CHANGE UP (ref 2.5–4.5)
PLATELET # BLD AUTO: 209 K/UL — SIGNIFICANT CHANGE UP (ref 150–400)
POTASSIUM SERPL-MCNC: 3.2 MMOL/L — LOW (ref 3.5–5.3)
POTASSIUM SERPL-SCNC: 3.2 MMOL/L — LOW (ref 3.5–5.3)
POTASSIUM UR-SCNC: 39 MMOL/L — SIGNIFICANT CHANGE UP
RBC # BLD: 4.05 M/UL — SIGNIFICANT CHANGE UP (ref 3.8–5.2)
RBC # FLD: 13.4 % — SIGNIFICANT CHANGE UP (ref 10.3–14.5)
SODIUM SERPL-SCNC: 128 MMOL/L — LOW (ref 135–145)
SODIUM UR-SCNC: 24 MMOL/L — SIGNIFICANT CHANGE UP
WBC # BLD: 4.78 K/UL — SIGNIFICANT CHANGE UP (ref 3.8–10.5)
WBC # FLD AUTO: 4.78 K/UL — SIGNIFICANT CHANGE UP (ref 3.8–10.5)

## 2020-06-17 RX ORDER — POTASSIUM CHLORIDE 20 MEQ
40 PACKET (EA) ORAL EVERY 4 HOURS
Refills: 0 | Status: COMPLETED | OUTPATIENT
Start: 2020-06-17 | End: 2020-06-17

## 2020-06-17 RX ORDER — DEXTROSE MONOHYDRATE, SODIUM CHLORIDE, AND POTASSIUM CHLORIDE 50; .745; 4.5 G/1000ML; G/1000ML; G/1000ML
1000 INJECTION, SOLUTION INTRAVENOUS
Refills: 0 | Status: DISCONTINUED | OUTPATIENT
Start: 2020-06-17 | End: 2020-06-18

## 2020-06-17 RX ORDER — SENNA PLUS 8.6 MG/1
2 TABLET ORAL ONCE
Refills: 0 | Status: COMPLETED | OUTPATIENT
Start: 2020-06-17 | End: 2020-06-17

## 2020-06-17 RX ORDER — AZITHROMYCIN 500 MG/1
250 TABLET, FILM COATED ORAL EVERY 24 HOURS
Refills: 0 | Status: DISCONTINUED | OUTPATIENT
Start: 2020-06-17 | End: 2020-06-18

## 2020-06-17 RX ADMIN — Medication 5 MILLIGRAM(S): at 09:56

## 2020-06-17 RX ADMIN — Medication 40 MILLIGRAM(S): at 21:39

## 2020-06-17 RX ADMIN — Medication 40 MILLIGRAM(S): at 12:41

## 2020-06-17 RX ADMIN — AZITHROMYCIN 255 MILLIGRAM(S): 500 TABLET, FILM COATED ORAL at 09:55

## 2020-06-17 RX ADMIN — Medication 40 MILLIEQUIVALENT(S): at 12:41

## 2020-06-17 RX ADMIN — Medication 50 MICROGRAM(S): at 05:17

## 2020-06-17 RX ADMIN — Medication 12.5 MILLIGRAM(S): at 05:17

## 2020-06-17 RX ADMIN — ENOXAPARIN SODIUM 40 MILLIGRAM(S): 100 INJECTION SUBCUTANEOUS at 12:45

## 2020-06-17 RX ADMIN — CEFTRIAXONE 100 MILLIGRAM(S): 500 INJECTION, POWDER, FOR SOLUTION INTRAMUSCULAR; INTRAVENOUS at 09:56

## 2020-06-17 RX ADMIN — DEXTROSE MONOHYDRATE, SODIUM CHLORIDE, AND POTASSIUM CHLORIDE 50 MILLILITER(S): 50; .745; 4.5 INJECTION, SOLUTION INTRAVENOUS at 12:41

## 2020-06-17 RX ADMIN — Medication 1 TABLET(S): at 09:56

## 2020-06-17 RX ADMIN — CHLORHEXIDINE GLUCONATE 1 APPLICATION(S): 213 SOLUTION TOPICAL at 12:42

## 2020-06-17 RX ADMIN — Medication 12.5 MILLIGRAM(S): at 17:40

## 2020-06-17 RX ADMIN — Medication 40 MILLIEQUIVALENT(S): at 15:28

## 2020-06-17 RX ADMIN — PANTOPRAZOLE SODIUM 40 MILLIGRAM(S): 20 TABLET, DELAYED RELEASE ORAL at 05:17

## 2020-06-17 NOTE — PROGRESS NOTE ADULT - SUBJECTIVE AND OBJECTIVE BOX
Patient is a 85y old  Female who presents with a chief complaint of hyponatremia (2020 14:07)    pt seen in icu [x  ], reg med floor [   ], bed [x  ], chair at bedside [   ], a+o x3 [ x ], lethargic [  ],  nad [x  ]    walters [ x ],        Allergies    No Known Allergies        Vitals    T(F): 97.5 (20 @ 05:05), Max: 97.9 (20 @ 14:16)  HR: 101 (20 @ 05:05) (71 - 101)  BP: 170/80 (20 @ 05:05) (118/58 - 170/80)  RR: 16 (20 @ 05:05) (15 - 28)  SpO2: 98% (20 @ 05:05) (97% - 100%)  Wt(kg): --  CAPILLARY BLOOD GLUCOSE          Labs                          13.4   6.43  )-----------( 212      ( 2020 05:14 )             38.6       06-16    130<L>  |  96  |  8   ----------------------------<  92  3.5   |  26  |  0.52    Ca    7.7<L>      2020 05:14  Phos  1.7     -16  Mg     1.9     16    TPro  6.8  /  Alb  3.4<L>  /  TBili  0.7  /  DBili  x   /  AST  34  /  ALT  37  /  AlkPhos  71  -16            .Blood Blood   @ 22:10   No growth to date.  --  --      .Blood Blood   @ 22:06   No growth to date.  --  --          Radiology Results      Meds    MEDICATIONS  (STANDING):  azithromycin  IVPB 500 milliGRAM(s) IV Intermittent every 24 hours  cefTRIAXone   IVPB 1000 milliGRAM(s) IV Intermittent every 24 hours  chlorhexidine 2% Cloths 1 Application(s) Topical daily  enoxaparin Injectable 40 milliGRAM(s) SubCutaneous daily  levothyroxine 50 MICROGram(s) Oral <User Schedule>  levothyroxine 75 MICROGram(s) Oral <User Schedule>  metoprolol tartrate 12.5 milliGRAM(s) Oral two times a day  pantoprazole    Tablet 40 milliGRAM(s) Oral before breakfast  potassium acid phosphate/sodium acid phosphate tablet (K-PHOS No. 2) 1 Tablet(s) Oral four times a day with meals      MEDICATIONS  (PRN):  artificial  tears Solution 1 Drop(s) Both EYES three times a day PRN Dry Eyes      Physical Exam    Neuro :  no focal deficits  Respiratory: CTA B/L  CV: RRR, S1S2, no murmurs,   Abdominal: Soft, NT, ND +BS,  Extremities: No edema, + peripheral pulses    ASSESSMENT    Hyponatremia, hypo-osmolarity, or hypo-osmolar hyponatremia   r/o pna  r/o covid-19  h/o HTN (hypertension)  S/P  section      PLAN        hold HCTZ,   cont losartan and bystolic   s/p NS 500cc  s/p DDAVP and D5 overnight  renal f/u  serum na improving   can d/c NS 50 ML per hour   cxr with Probable small infiltrates off both lower sergey left greater than right noted   rept cxr with Interval improvement in bilateral lower lung infiltrates c/w 20 exam noted above.     cont rocephin and zithromax  blood cx neg noted above  pulm f/u  covid test neg noted   contact and airborne isolation d/c'd  cont albuterol inhaler   cont O2 via nasal  as needed   pt DNR/DNI/no invasive procedure  cont current meds  mgmt as per icu Patient is a 85y old  Female who presents with a chief complaint of hyponatremia (2020 14:07)    pt seen in icu [  ], reg med floor [ x  ], bed [x  ], chair at bedside [   ], a+o x3 [ x ], lethargic [  ],  nad [x  ]    pt c/o costipation      Allergies    No Known Allergies        Vitals    T(F): 97.5 (20 @ 05:05), Max: 97.9 (20 @ 14:16)  HR: 101 (20 @ 05:05) (71 - 101)  BP: 170/80 (20 @ 05:05) (118/58 - 170/80)  RR: 16 (20 @ 05:05) (15 - 28)  SpO2: 98% (20 @ 05:05) (97% - 100%)  Wt(kg): --  CAPILLARY BLOOD GLUCOSE          Labs                          13.4   6.43  )-----------( 212      ( 2020 05:14 )             38.6       06-16    130<L>  |  96  |  8   ----------------------------<  92  3.5   |  26  |  0.52    Ca    7.7<L>      2020 05:14  Phos  1.7     06-16  Mg     1.9     06-16    TPro  6.8  /  Alb  3.4<L>  /  TBili  0.7  /  DBili  x   /  AST  34  /  ALT  37  /  AlkPhos  71  06-16    Vitamin D, 25-Hydroxy (20 @ 09:50)    Vitamin D, 25-Hydroxy: 63.9: VITD Interpretive Data Result:  30.0-80.0 ng/mL Optimal levels (Reference Range)  >80.0 ng/mL Toxicity possible  20.0-29.0 ng/mL Insufficiency  10.0-19.0 ng/mL Mild to Moderate Deficiency  <10.0 ng/mL Severe Deficiency  Optimal levels for 25-Hydroxy vitamin D are 30.0 ng/mL and above based  upon the Endocrine Society guidelines 2011.  However, there is a lack of  consensus on this and the [employer] recommends 20.0 ng/mL and  above as optimal levels.  Vitamin D results may vary depending on the  method of analysis.  The Roche klaudia e801 electrochemiluminescent  immunoassay method measures both D2 and D3. ng/mL            .Blood Blood   @ 22:10   No growth to date.  --  --      .Blood Blood   @ 22:06   No growth to date.  --  --          Radiology Results      Meds    MEDICATIONS  (STANDING):  azithromycin  IVPB 500 milliGRAM(s) IV Intermittent every 24 hours  cefTRIAXone   IVPB 1000 milliGRAM(s) IV Intermittent every 24 hours  chlorhexidine 2% Cloths 1 Application(s) Topical daily  enoxaparin Injectable 40 milliGRAM(s) SubCutaneous daily  levothyroxine 50 MICROGram(s) Oral <User Schedule>  levothyroxine 75 MICROGram(s) Oral <User Schedule>  metoprolol tartrate 12.5 milliGRAM(s) Oral two times a day  pantoprazole    Tablet 40 milliGRAM(s) Oral before breakfast  potassium acid phosphate/sodium acid phosphate tablet (K-PHOS No. 2) 1 Tablet(s) Oral four times a day with meals      MEDICATIONS  (PRN):  artificial  tears Solution 1 Drop(s) Both EYES three times a day PRN Dry Eyes      Physical Exam    Neuro :  no focal deficits  Respiratory: CTA B/L  CV: RRR, S1S2, no murmurs,   Abdominal: Soft, NT, ND +BS,  Extremities: No edema, + peripheral pulses    ASSESSMENT    Hyponatremia, hypo-osmolarity, or hypo-osmolar hyponatremia   r/o pna  h/o HTN (hypertension)  S/P  section      PLAN        hold HCTZ,   cont losartan and bystolic   s/p NS 500cc  s/p DDAVP and D5   renal f/u  serum na improving   Good response to Desmopressin , increased osmolality.  Most likely cause of hyponatremia is diuretics .  Hypo po4 , reduced po intake,   Correct PO4,   Vitamin D within normal levels noted above  Repeat UA .  cxr with Probable small infiltrates off both lower sergey left greater than right noted   rept cxr with Interval improvement in bilateral lower lung infiltrates c/w 20 exam noted above.     cont rocephin and zithromax  blood cx neg noted above  pulm f/u  covid test neg noted   contact and airborne isolation d/c'd  cont albuterol inhaler   cont O2 via nasal  as needed   pt DNR/DNI/no invasive procedure   add dulcolax x1  start senna at bedtime  cont current meds

## 2020-06-17 NOTE — PROGRESS NOTE ADULT - SUBJECTIVE AND OBJECTIVE BOX
Problem List:  Hyponatremia    PAST MEDICAL & SURGICAL HISTORY:  HTN (hypertension)  S/P  section      No Known Allergies      MEDICATIONS  (STANDING):  azithromycin   Tablet 250 milliGRAM(s) Oral every 24 hours  cefTRIAXone   IVPB 1000 milliGRAM(s) IV Intermittent every 24 hours  chlorhexidine 2% Cloths 1 Application(s) Topical daily  enoxaparin Injectable 40 milliGRAM(s) SubCutaneous daily  levothyroxine 50 MICROGram(s) Oral <User Schedule>  levothyroxine 75 MICROGram(s) Oral <User Schedule>  methylPREDNISolone sodium succinate Injectable 40 milliGRAM(s) IV Push three times a day  metoprolol tartrate 12.5 milliGRAM(s) Oral two times a day  pantoprazole    Tablet 40 milliGRAM(s) Oral before breakfast  potassium chloride    Tablet ER 40 milliEquivalent(s) Oral every 4 hours  sodium chloride 0.9% with potassium chloride 20 mEq/L 1000 milliLiter(s) (50 mL/Hr) IV Continuous <Continuous>    MEDICATIONS  (PRN):  artificial  tears Solution 1 Drop(s) Both EYES three times a day PRN Dry Eyes                            11.8   4.78  )-----------( 209      ( 2020 08:51 )             33.6     06-    128<L>  |  95<L>  |  7   ----------------------------<  93  3.2<L>   |  23  |  0.50    Ca    7.3<L>      2020 08:51  Phos  2.5     -  Mg     1.7         TPro  6.8  /  Alb  3.4<L>  /  TBili  0.7  /  DBili  x   /  AST  34  /  ALT  37  /  AlkPhos  71  -        Sodium, Random Urine: 29 mmol/L (06-15 @ 17:35)  Osmolality, Random Urine: 170 mos/kg (06-15 @ 17:35)  Creatinine, Random Urine: 17 mg/dL (06-15 @ 17:35)  Chloride, Random Urine: 31 mmol/L (06-15 @ 17:35)  Potassium, Random Urine: 13 mmol/L (06-15 @ 17:35)  Potassium, Random Urine: 13 mmol/L (06-15 @ 17:35)  Osmolality, Random Urine: 158 mos/kg (06-15 @ 11:51)  Sodium, Random Urine: 46 mmol/L (06-15 @ 11:51)  Osmolality, Random Urine: 389 mos/kg ( @ 18:30)  Sodium, Random Urine: 64 mmol/L ( @ 18:30)  Osmolality, Random Urine: 372 mos/kg ( @ 12:46)  Sodium, Random Urine: 87 mmol/L ( @ 12:45)  Creatinine, Random Urine: 21 mg/dL ( @ 12:45)  Sodium, Random Urine: 87 mmol/L ( @ 08:40)  Potassium, Random Urine: 41 mmol/L ( @ 08:40)  Osmolality, Random Urine: 358 mos/kg ( @ 08:40)  Sodium, Random Urine: 66 mmol/L ( @ 01:17)  Potassium, Random Urine: 58 mmol/L ( @ 01:17)  Osmolality, Random Urine: 373 mos/kg ( @ 01:17)  Sodium, Random Urine: 19 mmol/L ( @ 20:23)  Osmolality, Random Urine: 109 mos/kg ( @ 20:23)  Potassium, Random Urine: 8 mmol/L ( @ 20:23)  Potassium, Random Urine: 7 mmol/L ( @ 17:33)  Osmolality, Random Urine: 110 mos/kg ( @ 17:33)  Sodium, Random Urine: 17 mmol/L ( @ 17:27)  Creatinine, Random Urine: <13 mg/dL ( @ 17:27)  Chloride, Random Urine: 13 mmol/L ( @ 17:27)      REVIEW OF SYSTEMS:  General: no fever no chills, no weight loss.  EYES/ENT: No visual changes;  No vertigo, no headache.  NECK: No pain or stiffness  RESPIRATORY: No cough, wheezing, hemoptysis; No shortness of breath  CARDIOVASCULAR: No chest pain or palpitations. No Edema  GASTROINTESTINAL: No abdominal or epigastric pain. No nausea, vomiting. No diarrhea or constipation. No melena.  GENITOURINARY: No dysuria, frequency, foamy urine, urinary urgency, incontinence or hematuria  NEUROLOGICAL: No numbness or weakness, no tremor , no dizziness.   Muscle skeletal : no joint pain and no swelling of joints and limbs.  SKIN: No itching, burning, rashes.        VITALS:  T(F): 97.4 (20 @ 13:23), Max: 97.9 (20 @ 14:16)  HR: 95 (20 @ 13:23)  BP: 148/79 (20 @ 13:23)  RR: 19 (20 @ 13:23)  SpO2: 98% (20 @ 05:05)  Wt(kg): --     @ 07:01  -   @ 07:00  --------------------------------------------------------  IN: 800 mL / OUT: 450 mL / NET: 350 mL        PHYSICAL EXAM:  Constitutional: well developed, no diaphoresis, no distress.  Neck: No JVD, no carotid bruit, supple, no adenopathy  Respiratory: Good air entrance B/L, no wheezes, rales or rhonchi  Cardiovascular: S1, S2, RRR, no pericardial rub, no murmur  Abdomen: BS+, soft, no tenderness, no bruit  Pelvis: bladder nondistended  Extremities: No cyanosis or clubbing. No peripheral edema.   Pulses: All present

## 2020-06-17 NOTE — PROGRESS NOTE ADULT - ASSESSMENT
Hyponatremia and hypokalemia.  Glycosuria on last UA.  Follow up urine lytes and osmolality.  Follow up PO intake.  Liberalized salt intake    Reduced PO4 and K may reflect increased po intake and cellular up take  Follow up magnesium level daily.

## 2020-06-17 NOTE — CHART NOTE - NSCHARTNOTEFT_GEN_A_CORE
Assessment:      Nutrition reassessment for follow-up. Chart reviewed, downgraded from ICU, pt visited, alert, speaks [place] and English, able to communicate, but limited information as pt very weak at present; Spoke to RN, no GI distress, swallowing/chewing problem reported at present     Factors impacting intake: [ ] none [ ] nausea  [ ] vomiting [ ] diarrhea [ ] constipation  [ ]chewing problems [ ] swallowing issues  [ X ] other: decreased appetite; acute on chronic comorbidities     Diet Prescription: Diet, DASH/TLC:   Sodium & Cholesterol Restricted (06-15-20 @ 14:56)    Intake: poor appetite, 25 to 40% intake per flow sheet, observed breakfast today--25% intake; no new food choices to update    Daily Weight in k.8 (2020 08:00)  Weight in k.8 (15 Campos 2020 15:07)  Weight in k (15 Campos 2020 08:00)  Weight in k.8 (2020 08:00    % Weight Change: See above; Generalized 1+ edema noted       Pertinent Medications: MEDICATIONS  (STANDING):  azithromycin  IVPB 500 milliGRAM(s) IV Intermittent every 24 hours  bisacodyl 5 milliGRAM(s) Oral once  cefTRIAXone   IVPB 1000 milliGRAM(s) IV Intermittent every 24 hours  chlorhexidine 2% Cloths 1 Application(s) Topical daily  enoxaparin Injectable 40 milliGRAM(s) SubCutaneous daily  levothyroxine 50 MICROGram(s) Oral <User Schedule>  levothyroxine 75 MICROGram(s) Oral <User Schedule>  metoprolol tartrate 12.5 milliGRAM(s) Oral two times a day  pantoprazole    Tablet 40 milliGRAM(s) Oral before breakfast  potassium acid phosphate/sodium acid phosphate tablet (K-PHOS No. 2) 1 Tablet(s) Oral four times a day with meals  senna 2 Tablet(s) Oral once    MEDICATIONS  (PRN):  artificial  tears Solution 1 Drop(s) Both EYES three times a day PRN Dry Eyes    Pertinent Labs:  Na130 mmol/L<L> Glu 92 mg/dL K+ 3.5 mmol/L Cr  0.52 mg/dL BUN 8 mg/dL  Phos 1.7 mg/dL<L>  Alb 3.4 g/dL<L>     CAPILLARY BLOOD GLUCOSE    Skin: intact     Estimated Needs:   [ X ] no change since previous assessment  [ ] recalculated:     Previous Nutrition Diagnosis:   [ X ] Malnutrition ( Moderate )    Nutrition Diagnosis is [ X ] ongoing  [ ] Improving   [ ] resolved [ ] not applicable     New Nutrition Diagnosis: [ X ] not applicable       Interventions:   Recommend  [ ] Change Diet To:  [ X ] Nutrition Supplement: Ensure Compact 1 can ( 4 oz or 120 ml ) x tid daily ( 660 kcal,  27 g protein) as medically feasible   [ ] Nutrition Support  [ X ] Other: Discussed with MD/RN                   Provide food choices within diet Rx as available/updated     Monitoring and Evaluation:   [ X ] PO intake [ x ] Tolerance to diet prescription [ x ] weights [ x ] labs[ x ] follow up per protocol  [ ] other:

## 2020-06-17 NOTE — PROGRESS NOTE ADULT - ASSESSMENT
86 yo F from home, AAOX3, ambulates with cane with PMH of HTN and hypothyroidism BIBA to ED c/o feeling fatigue and generalized weakness for past 3 weeks, MADDEN and decreased appetite. Found Na 113, and CXR noted b/l lower lobes infiltrates. Admit to ICU for Hyponatremia and PNA       Problems:   -Hyponatremia   -PNA, r/o COVID   -HTN  -Hypothyroidism     Plan:    Hyponatremia   2/2 diuretics vs SIADH   -Na on admission 113   Na improving 113>116>121/122>130   s/p DDAVP and D5   - currently on IVF 50cc/hr  -BMP q12  -Nephro consulted Dr. Alonso garzon d/c walters, tov  - U/o ; 150 c/hr    PNA  -CXR noted b/l lower lobes infiltrates.   - BCx NGTD and  COVID negX2  Saturation 97% on RA , comfortable, no sob/work of breath   -c/w zithromax and rocephin     HTN  -hold HCTZ, resume losartan and bystolic   ECG: NSR, incomplete RBBB  Monitor BP and adjust meds    Hypothyroidism  -c/w synthroid home dose   - TSH 2.48   keep FS    Hb A1c 5.5    PPx: on dvt and gi ppx    GOC: DNR/DNI/no invasive procedure. 86 yo F from home, AAOX3, ambulates with cane with PMH of HTN and hypothyroidism BIBA to ED c/o feeling fatigue and generalized weakness for past 3 weeks, MADDEN and decreased appetite. Found Na 113, and CXR noted b/l lower lobes infiltrates. Admit to ICU for Hyponatremia and PNA       Problems:   -Hyponatremia   -PNA, r/o COVID   -HTN  -Hypothyroidism     Plan:    Hyponatremia   2/2 diuretics vs SIADH   -Na on admission 113   Na improving 113>116>121/122>130   s/p DDAVP and D5   - currently on IVF 50cc/hr  -BMP q12  -Nephro consulted Dr. Alonso Roth was d/cd  - U/o ; 150 c/hr    PNA  -CXR noted b/l lower lobes infiltrates.   - BCx NGTD and  COVID negX2  Saturation 97% on RA , comfortable, no sob/work of breath   -c/w zithromax and rocephin     HTN  -hold HCTZ, resume losartan and bystolic   ECG: NSR, incomplete RBBB  Monitor BP and adjust meds    Hypothyroidism  -c/w synthroid home dose   - TSH 2.48   keep FS    Hb A1c 5.5    PPx: on dvt and gi ppx    GOC: DNR/DNI/no invasive procedure.

## 2020-06-17 NOTE — PROGRESS NOTE ADULT - SUBJECTIVE AND OBJECTIVE BOX
Patient is a 85y old  Female who presents with a chief complaint of hyponatremia (2020 08:49)  awake, alert, comfortable in bed in NAD. Complaining of sob when not wearing oxygen NC. No cough    INTERVAL HPI/OVERNIGHT EVENTS:      VITAL SIGNS:  T(F): 97.5 (20 @ 05:05)  HR: 102 (20 @ 06:53)  BP: 137/93 (20 @ 06:53)  RR: 16 (20 @ 05:05)  SpO2: 98% (20 @ 05:05)  Wt(kg): --  I&O's Detail    2020 07:01  -  2020 07:00  --------------------------------------------------------  IN:    Oral Fluid: 500 mL    Solution: 50 mL    Solution: 250 mL  Total IN: 800 mL    OUT:    Ureteral Catheter: 450 mL  Total OUT: 450 mL    Total NET: 350 mL              REVIEW OF SYSTEMS:    CONSTITUTIONAL:  No fevers, chills, sweats    HEENT:  Eyes:  No diplopia or blurred vision. ENT:  No earache, sore throat or runny nose.    CARDIOVASCULAR:  No pressure, squeezing, tightness, or heaviness about the chest; no palpitations.    RESPIRATORY:  Per HPI    GASTROINTESTINAL:  No abdominal pain, nausea, vomiting or diarrhea.    GENITOURINARY:  No dysuria, frequency or urgency.    NEUROLOGIC:  No paresthesias, fasciculations, seizures or weakness.    PSYCHIATRIC:  No disorder of thought or mood.      PHYSICAL EXAM:    Constitutional: Well developed and nourished  Eyes:Perrla  ENMT: normal  Neck:supple  Respiratory: Rales post  Cardiovascular: S1 S2 regular  Gastrointestinal: Soft, Non tender  Extremities: No edema  Vascular:normal  Neurological:Awake, alert,Ox3  Musculoskeletal:Normal      MEDICATIONS  (STANDING):  azithromycin   Tablet 250 milliGRAM(s) Oral every 24 hours  cefTRIAXone   IVPB 1000 milliGRAM(s) IV Intermittent every 24 hours  chlorhexidine 2% Cloths 1 Application(s) Topical daily  enoxaparin Injectable 40 milliGRAM(s) SubCutaneous daily  levothyroxine 50 MICROGram(s) Oral <User Schedule>  levothyroxine 75 MICROGram(s) Oral <User Schedule>  metoprolol tartrate 12.5 milliGRAM(s) Oral two times a day  pantoprazole    Tablet 40 milliGRAM(s) Oral before breakfast  potassium chloride    Tablet ER 40 milliEquivalent(s) Oral every 4 hours  senna 2 Tablet(s) Oral once  sodium chloride 0.9% with potassium chloride 20 mEq/L 1000 milliLiter(s) (50 mL/Hr) IV Continuous <Continuous>    MEDICATIONS  (PRN):  artificial  tears Solution 1 Drop(s) Both EYES three times a day PRN Dry Eyes      Allergies    No Known Allergies    Intolerances        LABS:                        11.8   4.78  )-----------( 209      ( 2020 08:51 )             33.6         128<L>  |  95<L>  |  7   ----------------------------<  93  3.2<L>   |  23  |  0.50    Ca    7.3<L>      2020 08:51  Phos  2.5       Mg     1.7         TPro  6.8  /  Alb  3.4<L>  /  TBili  0.7  /  DBili  x   /  AST  34  /  ALT  37  /  AlkPhos  71        Urinalysis Basic - ( 2020 16:12 )    Color: Yellow / Appearance: Clear / S.015 / pH: x  Gluc: x / Ketone: Trace  / Bili: Negative / Urobili: Negative   Blood: x / Protein: 15 / Nitrite: Negative   Leuk Esterase: Negative / RBC: >50 /HPF / WBC 3-5 /HPF   Sq Epi: x / Non Sq Epi: Occasional /HPF / Bacteria: Trace /HPF            CAPILLARY BLOOD GLUCOSE        pro-bnp --  @ 16:46     d-dimer 179   @ 16:46  pro-bnp 505  @ 14:13     d-dimer --   @ 14:13      RADIOLOGY & ADDITIONAL TESTS:    CXR:  < from: Xray Chest 1 View- PORTABLE-Urgent (20 @ 07:55) >  FINDINGS: Heart size appears within normal limits. No hilar or superior mediastinal abnormalities are identified. Bilateral lower lobe infiltrates noted on 2020 has significantly improved with minimal residual opacity identified bilaterally.There is no evidence for interval development of lobar consolidation. No pleural effusion or pneumothorax is demonstrated. No mediastinal shift noted. No osseous fractures identified.    IMPRESSION: As above.      < end of copied text >    Ct scan chest:    ekg;    echo:

## 2020-06-17 NOTE — PROGRESS NOTE ADULT - SUBJECTIVE AND OBJECTIVE BOX
PGY 1 Note discussed with supervising resident and primary attending    Patient is a 85y old  Female who presents with a chief complaint of hyponatremia (2020 06:27)      INTERVAL HPI/OVERNIGHT EVENTS:   patient is compalining of constipation      MEDICATIONS  (STANDING):  azithromycin  IVPB 500 milliGRAM(s) IV Intermittent every 24 hours  bisacodyl 5 milliGRAM(s) Oral once  cefTRIAXone   IVPB 1000 milliGRAM(s) IV Intermittent every 24 hours  chlorhexidine 2% Cloths 1 Application(s) Topical daily  enoxaparin Injectable 40 milliGRAM(s) SubCutaneous daily  levothyroxine 50 MICROGram(s) Oral <User Schedule>  levothyroxine 75 MICROGram(s) Oral <User Schedule>  metoprolol tartrate 12.5 milliGRAM(s) Oral two times a day  pantoprazole    Tablet 40 milliGRAM(s) Oral before breakfast  potassium acid phosphate/sodium acid phosphate tablet (K-PHOS No. 2) 1 Tablet(s) Oral four times a day with meals  senna 2 Tablet(s) Oral once    MEDICATIONS  (PRN):  artificial  tears Solution 1 Drop(s) Both EYES three times a day PRN Dry Eyes      __________________________________________________  REVIEW OF SYSTEMS:    CONSTITUTIONAL: No fever,   EYES: no acute visual disturbances  NECK: No pain or stiffness  RESPIRATORY: No cough; No shortness of breath  CARDIOVASCULAR: No chest pain, no palpitations  GASTROINTESTINAL: No pain. No nausea or vomiting; No diarrhea   NEUROLOGICAL: No headache or numbness, no tremors  MUSCULOSKELETAL: No joint pain, no muscle pain  GENITOURINARY: no dysuria, no frequency, no hesitancy  PSYCHIATRY: no depression , no anxiety  ALL OTHER  ROS negative        Vital Signs Last 24 Hrs  T(C): 36.4 (2020 05:05), Max: 36.6 (2020 14:16)  T(F): 97.5 (2020 05:05), Max: 97.9 (2020 14:16)  HR: 102 (2020 06:53) (71 - 102)  BP: 137/93 (2020 06:53) (118/58 - 170/80)  BP(mean): 81 (2020 12:00) (73 - 99)  RR: 16 (2020 05:05) (16 - 26)  SpO2: 98% (2020 05:05) (97% - 100%)    ________________________________________________  PHYSICAL EXAM:  GENERAL: NAD  HEENT: Normocephalic;  conjunctivae and sclerae clear; moist mucous membranes;   NECK : supple  CHEST/LUNG: Clear to auscultation bilaterally with good air entry   HEART: S1 S2  regular; no murmurs, gallops or rubs  ABDOMEN: Soft, Nontender, Nondistended; Bowel sounds present  EXTREMITIES: no cyanosis; no edema; no calf tenderness  SKIN: warm and dry; no rash  NERVOUS SYSTEM:  Awake and alert; Oriented  to place, person and time ; no new deficits    _________________________________________________  LABS:                        13.4   6.43  )-----------( 212      ( 2020 05:14 )             38.6     06-16    130<L>  |  96  |  8   ----------------------------<  92  3.5   |  26  |  0.52    Ca    7.7<L>      2020 05:14  Phos  1.7     06-16  Mg     1.9     06-16    TPro  6.8  /  Alb  3.4<L>  /  TBili  0.7  /  DBili  x   /  AST  34  /  ALT  37  /  AlkPhos  71  06-16      Urinalysis Basic - ( 2020 16:12 )    Color: Yellow / Appearance: Clear / S.015 / pH: x  Gluc: x / Ketone: Trace  / Bili: Negative / Urobili: Negative   Blood: x / Protein: 15 / Nitrite: Negative   Leuk Esterase: Negative / RBC: >50 /HPF / WBC 3-5 /HPF   Sq Epi: x / Non Sq Epi: Occasional /HPF / Bacteria: Trace /HPF      CAPILLARY BLOOD GLUCOSE            RADIOLOGY & ADDITIONAL TESTS:    Imaging Personally Reviewed:  YES/NO    Consultant(s) Notes Reviewed:   YES/ No    Care Discussed with Consultants :     Plan of care was discussed with patient and /or primary care giver; all questions and concerns were addressed and care was aligned with patient's wishes. PGY 1 Note discussed with supervising resident and primary attending    Patient is a 85y old  Female who presents with a chief complaint of hyponatremia (2020 06:27)      INTERVAL HPI/OVERNIGHT EVENTS:   patient is compalining of constipation      MEDICATIONS  (STANDING):  azithromycin  IVPB 500 milliGRAM(s) IV Intermittent every 24 hours  bisacodyl 5 milliGRAM(s) Oral once  cefTRIAXone   IVPB 1000 milliGRAM(s) IV Intermittent every 24 hours  chlorhexidine 2% Cloths 1 Application(s) Topical daily  enoxaparin Injectable 40 milliGRAM(s) SubCutaneous daily  levothyroxine 50 MICROGram(s) Oral <User Schedule>  levothyroxine 75 MICROGram(s) Oral <User Schedule>  metoprolol tartrate 12.5 milliGRAM(s) Oral two times a day  pantoprazole    Tablet 40 milliGRAM(s) Oral before breakfast  potassium acid phosphate/sodium acid phosphate tablet (K-PHOS No. 2) 1 Tablet(s) Oral four times a day with meals  senna 2 Tablet(s) Oral once    MEDICATIONS  (PRN):  artificial  tears Solution 1 Drop(s) Both EYES three times a day PRN Dry Eyes      __________________________________________________  REVIEW OF SYSTEMS:    CONSTITUTIONAL: No fever,   RESPIRATORY: No cough; No shortness of breath  CARDIOVASCULAR: No chest pain, no palpitations  GASTROINTESTINAL: constipation  NEUROLOGICAL: No headache or numbness, no tremors  MUSCULOSKELETAL: No joint pain, no muscle pain        Vital Signs Last 24 Hrs  T(C): 36.4 (2020 05:05), Max: 36.6 (2020 14:16)  T(F): 97.5 (2020 05:05), Max: 97.9 (2020 14:16)  HR: 102 (2020 06:53) (71 - 102)  BP: 137/93 (2020 06:53) (118/58 - 170/80)  BP(mean): 81 (2020 12:00) (73 - 99)  RR: 16 (2020 05:05) (16 - 26)  SpO2: 98% (2020 05:05) (97% - 100%)    ________________________________________________  PHYSICAL EXAM:  GENERAL: NAD  CHEST/LUNG: Clear to auscultation bilaterally with good air entry   HEART: S1 S2  regular; no murmurs, gallops or rubs  ABDOMEN: Soft, Nontender, Nondistended; Bowel sounds present  EXTREMITIES: no cyanosis; no edema; no calf tenderness  NERVOUS SYSTEM:  Awake and alert; Oriented  to place, person and time ; no new deficits    _________________________________________________  LABS:                        13.4   6.43  )-----------( 212      ( 2020 05:14 )             38.6     06-16    130<L>  |  96  |  8   ----------------------------<  92  3.5   |  26  |  0.52    Ca    7.7<L>      2020 05:14  Phos  1.7     06-16  Mg     1.9     -16    TPro  6.8  /  Alb  3.4<L>  /  TBili  0.7  /  DBili  x   /  AST  34  /  ALT  37  /  AlkPhos  71  06-16      Urinalysis Basic - ( 2020 16:12 )    Color: Yellow / Appearance: Clear / S.015 / pH: x  Gluc: x / Ketone: Trace  / Bili: Negative / Urobili: Negative   Blood: x / Protein: 15 / Nitrite: Negative   Leuk Esterase: Negative / RBC: >50 /HPF / WBC 3-5 /HPF   Sq Epi: x / Non Sq Epi: Occasional /HPF / Bacteria: Trace /HPF      CAPILLARY BLOOD GLUCOSE            RADIOLOGY & ADDITIONAL TESTS:    Imaging Personally Reviewed:  YES/NO    Consultant(s) Notes Reviewed:   YES/ No    Care Discussed with Consultants :     Plan of care was discussed with patient and /or primary care giver; all questions and concerns were addressed and care was aligned with patient's wishes.

## 2020-06-18 ENCOUNTER — TRANSCRIPTION ENCOUNTER (OUTPATIENT)
Age: 85
End: 2020-06-18

## 2020-06-18 VITALS
SYSTOLIC BLOOD PRESSURE: 135 MMHG | TEMPERATURE: 98 F | HEART RATE: 108 BPM | DIASTOLIC BLOOD PRESSURE: 77 MMHG | RESPIRATION RATE: 18 BRPM | OXYGEN SATURATION: 97 %

## 2020-06-18 LAB
ALBUMIN SERPL ELPH-MCNC: 3.1 G/DL — LOW (ref 3.5–5)
ALP SERPL-CCNC: 67 U/L — SIGNIFICANT CHANGE UP (ref 40–120)
ALT FLD-CCNC: 35 U/L DA — SIGNIFICANT CHANGE UP (ref 10–60)
ANION GAP SERPL CALC-SCNC: 8 MMOL/L — SIGNIFICANT CHANGE UP (ref 5–17)
AST SERPL-CCNC: 29 U/L — SIGNIFICANT CHANGE UP (ref 10–40)
BILIRUB SERPL-MCNC: 0.5 MG/DL — SIGNIFICANT CHANGE UP (ref 0.2–1.2)
BUN SERPL-MCNC: 15 MG/DL — SIGNIFICANT CHANGE UP (ref 7–18)
CALCIUM SERPL-MCNC: 7.5 MG/DL — LOW (ref 8.4–10.5)
CHLORIDE SERPL-SCNC: 100 MMOL/L — SIGNIFICANT CHANGE UP (ref 96–108)
CO2 SERPL-SCNC: 24 MMOL/L — SIGNIFICANT CHANGE UP (ref 22–31)
CREAT SERPL-MCNC: 0.58 MG/DL — SIGNIFICANT CHANGE UP (ref 0.5–1.3)
CULTURE RESULTS: SIGNIFICANT CHANGE UP
CULTURE RESULTS: SIGNIFICANT CHANGE UP
GLUCOSE BLDC GLUCOMTR-MCNC: 124 MG/DL — HIGH (ref 70–99)
GLUCOSE SERPL-MCNC: 124 MG/DL — HIGH (ref 70–99)
HCT VFR BLD CALC: 34.1 % — LOW (ref 34.5–45)
HGB BLD-MCNC: 11.8 G/DL — SIGNIFICANT CHANGE UP (ref 11.5–15.5)
MAGNESIUM SERPL-MCNC: 2 MG/DL — SIGNIFICANT CHANGE UP (ref 1.6–2.6)
MCHC RBC-ENTMCNC: 29.1 PG — SIGNIFICANT CHANGE UP (ref 27–34)
MCHC RBC-ENTMCNC: 34.6 GM/DL — SIGNIFICANT CHANGE UP (ref 32–36)
MCV RBC AUTO: 84.2 FL — SIGNIFICANT CHANGE UP (ref 80–100)
NRBC # BLD: 0 /100 WBCS — SIGNIFICANT CHANGE UP (ref 0–0)
PHOSPHATE SERPL-MCNC: 2.6 MG/DL — SIGNIFICANT CHANGE UP (ref 2.5–4.5)
PLATELET # BLD AUTO: 216 K/UL — SIGNIFICANT CHANGE UP (ref 150–400)
POTASSIUM SERPL-MCNC: 5.1 MMOL/L — SIGNIFICANT CHANGE UP (ref 3.5–5.3)
POTASSIUM SERPL-SCNC: 5.1 MMOL/L — SIGNIFICANT CHANGE UP (ref 3.5–5.3)
PROT ?TM UR-MCNC: 50 MG/DL — HIGH (ref 0–12)
PROT SERPL-MCNC: 6.4 G/DL — SIGNIFICANT CHANGE UP (ref 6–8.3)
RBC # BLD: 4.05 M/UL — SIGNIFICANT CHANGE UP (ref 3.8–5.2)
RBC # FLD: 13.6 % — SIGNIFICANT CHANGE UP (ref 10.3–14.5)
SODIUM SERPL-SCNC: 132 MMOL/L — LOW (ref 135–145)
SPECIMEN SOURCE: SIGNIFICANT CHANGE UP
SPECIMEN SOURCE: SIGNIFICANT CHANGE UP
WBC # BLD: 4.3 K/UL — SIGNIFICANT CHANGE UP (ref 3.8–10.5)
WBC # FLD AUTO: 4.3 K/UL — SIGNIFICANT CHANGE UP (ref 3.8–10.5)

## 2020-06-18 PROCEDURE — 86335 IMMUNFIX E-PHORSIS/URINE/CSF: CPT

## 2020-06-18 PROCEDURE — 84484 ASSAY OF TROPONIN QUANT: CPT

## 2020-06-18 PROCEDURE — 87040 BLOOD CULTURE FOR BACTERIA: CPT

## 2020-06-18 PROCEDURE — 85730 THROMBOPLASTIN TIME PARTIAL: CPT

## 2020-06-18 PROCEDURE — 85610 PROTHROMBIN TIME: CPT

## 2020-06-18 PROCEDURE — 82728 ASSAY OF FERRITIN: CPT

## 2020-06-18 PROCEDURE — 85384 FIBRINOGEN ACTIVITY: CPT

## 2020-06-18 PROCEDURE — 36415 COLL VENOUS BLD VENIPUNCTURE: CPT

## 2020-06-18 PROCEDURE — 84560 ASSAY OF URINE/URIC ACID: CPT

## 2020-06-18 PROCEDURE — 84145 PROCALCITONIN (PCT): CPT

## 2020-06-18 PROCEDURE — 82962 GLUCOSE BLOOD TEST: CPT

## 2020-06-18 PROCEDURE — 83935 ASSAY OF URINE OSMOLALITY: CPT

## 2020-06-18 PROCEDURE — 84133 ASSAY OF URINE POTASSIUM: CPT

## 2020-06-18 PROCEDURE — 84443 ASSAY THYROID STIM HORMONE: CPT

## 2020-06-18 PROCEDURE — 82272 OCCULT BLD FECES 1-3 TESTS: CPT

## 2020-06-18 PROCEDURE — 82306 VITAMIN D 25 HYDROXY: CPT

## 2020-06-18 PROCEDURE — 82570 ASSAY OF URINE CREATININE: CPT

## 2020-06-18 PROCEDURE — 83605 ASSAY OF LACTIC ACID: CPT

## 2020-06-18 PROCEDURE — 84100 ASSAY OF PHOSPHORUS: CPT

## 2020-06-18 PROCEDURE — 86140 C-REACTIVE PROTEIN: CPT

## 2020-06-18 PROCEDURE — 99291 CRITICAL CARE FIRST HOUR: CPT

## 2020-06-18 PROCEDURE — 85027 COMPLETE CBC AUTOMATED: CPT

## 2020-06-18 PROCEDURE — 94640 AIRWAY INHALATION TREATMENT: CPT

## 2020-06-18 PROCEDURE — 85379 FIBRIN DEGRADATION QUANT: CPT

## 2020-06-18 PROCEDURE — 82436 ASSAY OF URINE CHLORIDE: CPT

## 2020-06-18 PROCEDURE — 97530 THERAPEUTIC ACTIVITIES: CPT

## 2020-06-18 PROCEDURE — 97116 GAIT TRAINING THERAPY: CPT

## 2020-06-18 PROCEDURE — 93005 ELECTROCARDIOGRAM TRACING: CPT

## 2020-06-18 PROCEDURE — 83880 ASSAY OF NATRIURETIC PEPTIDE: CPT

## 2020-06-18 PROCEDURE — 97110 THERAPEUTIC EXERCISES: CPT

## 2020-06-18 PROCEDURE — 84156 ASSAY OF PROTEIN URINE: CPT

## 2020-06-18 PROCEDURE — 84300 ASSAY OF URINE SODIUM: CPT

## 2020-06-18 PROCEDURE — 80048 BASIC METABOLIC PNL TOTAL CA: CPT

## 2020-06-18 PROCEDURE — 80053 COMPREHEN METABOLIC PANEL: CPT

## 2020-06-18 PROCEDURE — 83036 HEMOGLOBIN GLYCOSYLATED A1C: CPT

## 2020-06-18 PROCEDURE — 71045 X-RAY EXAM CHEST 1 VIEW: CPT

## 2020-06-18 PROCEDURE — U0003: CPT

## 2020-06-18 PROCEDURE — 83735 ASSAY OF MAGNESIUM: CPT

## 2020-06-18 PROCEDURE — 82009 KETONE BODYS QUAL: CPT

## 2020-06-18 PROCEDURE — 83930 ASSAY OF BLOOD OSMOLALITY: CPT

## 2020-06-18 PROCEDURE — 84105 ASSAY OF URINE PHOSPHORUS: CPT

## 2020-06-18 PROCEDURE — 83615 LACTATE (LD) (LDH) ENZYME: CPT

## 2020-06-18 PROCEDURE — 81001 URINALYSIS AUTO W/SCOPE: CPT

## 2020-06-18 RX ORDER — PANTOPRAZOLE SODIUM 20 MG/1
1 TABLET, DELAYED RELEASE ORAL
Qty: 10 | Refills: 0
Start: 2020-06-18 | End: 2020-06-27

## 2020-06-18 RX ORDER — CEFUROXIME AXETIL 250 MG
1 TABLET ORAL
Qty: 4 | Refills: 0
Start: 2020-06-18 | End: 2020-06-19

## 2020-06-18 RX ORDER — SENNA PLUS 8.6 MG/1
2 TABLET ORAL AT BEDTIME
Refills: 0 | Status: DISCONTINUED | OUTPATIENT
Start: 2020-06-18 | End: 2020-06-18

## 2020-06-18 RX ORDER — BUDESONIDE AND FORMOTEROL FUMARATE DIHYDRATE 160; 4.5 UG/1; UG/1
2 AEROSOL RESPIRATORY (INHALATION)
Refills: 0 | Status: DISCONTINUED | OUTPATIENT
Start: 2020-06-18 | End: 2020-06-18

## 2020-06-18 RX ORDER — LACTULOSE 10 G/15ML
10 SOLUTION ORAL ONCE
Refills: 0 | Status: COMPLETED | OUTPATIENT
Start: 2020-06-18 | End: 2020-06-18

## 2020-06-18 RX ORDER — LOSARTAN/HYDROCHLOROTHIAZIDE 100MG-25MG
1 TABLET ORAL
Qty: 0 | Refills: 0 | DISCHARGE

## 2020-06-18 RX ORDER — BUDESONIDE AND FORMOTEROL FUMARATE DIHYDRATE 160; 4.5 UG/1; UG/1
2 AEROSOL RESPIRATORY (INHALATION)
Qty: 1 | Refills: 0
Start: 2020-06-18 | End: 2020-07-17

## 2020-06-18 RX ORDER — SENNA PLUS 8.6 MG/1
2 TABLET ORAL
Qty: 60 | Refills: 0
Start: 2020-06-18 | End: 2020-07-17

## 2020-06-18 RX ADMIN — LACTULOSE 10 GRAM(S): 10 SOLUTION ORAL at 10:34

## 2020-06-18 RX ADMIN — ENOXAPARIN SODIUM 40 MILLIGRAM(S): 100 INJECTION SUBCUTANEOUS at 11:05

## 2020-06-18 RX ADMIN — Medication 12.5 MILLIGRAM(S): at 05:15

## 2020-06-18 RX ADMIN — PANTOPRAZOLE SODIUM 40 MILLIGRAM(S): 20 TABLET, DELAYED RELEASE ORAL at 05:15

## 2020-06-18 RX ADMIN — CEFTRIAXONE 100 MILLIGRAM(S): 500 INJECTION, POWDER, FOR SOLUTION INTRAMUSCULAR; INTRAVENOUS at 10:34

## 2020-06-18 RX ADMIN — Medication 50 MICROGRAM(S): at 05:15

## 2020-06-18 RX ADMIN — BUDESONIDE AND FORMOTEROL FUMARATE DIHYDRATE 2 PUFF(S): 160; 4.5 AEROSOL RESPIRATORY (INHALATION) at 10:34

## 2020-06-18 RX ADMIN — Medication 40 MILLIGRAM(S): at 05:15

## 2020-06-18 RX ADMIN — AZITHROMYCIN 250 MILLIGRAM(S): 500 TABLET, FILM COATED ORAL at 07:34

## 2020-06-18 NOTE — DISCHARGE NOTE PROVIDER - NSDCMRMEDTOKEN_GEN_ALL_CORE_FT
Bystolic 5 mg oral tablet: 1 tab(s) orally once a day  losartan-hydrochlorothiazide 100mg-12.5mg oral tablet: 1 tab(s) orally once a day  Synthroid 50 mcg (0.05 mg) oral tablet: 1 tab(s) orally once a day  Monday to Friday  Synthroid 75 mcg (0.075 mg) oral tablet: 1 tab(s) orally once a day  Sat/ Sun Bystolic 5 mg oral tablet: 1 tab(s) orally once a day  Synthroid 50 mcg (0.05 mg) oral tablet: 1 tab(s) orally once a day  Monday to Friday  Synthroid 75 mcg (0.075 mg) oral tablet: 1 tab(s) orally once a day  Sat/ Sun budesonide-formoterol 160 mcg-4.5 mcg/inh inhalation aerosol: 2 puff(s) inhaled 2 times a day   Bystolic 5 mg oral tablet: 1 tab(s) orally once a day  cefuroxime 250 mg oral tablet: 1 tab(s) orally 2 times a day   pantoprazole 40 mg oral delayed release tablet: 1 tab(s) orally once a day (before a meal)  predniSONE 10 mg oral tablet: 4 tab once a day x 4 days  3 tab once a day x 4 days  2 tab once a day x 4 days  1 tab once a day x 4 days  senna oral tablet: 2 tab(s) orally once a day (at bedtime)  Synthroid 50 mcg (0.05 mg) oral tablet: 1 tab(s) orally once a day  Monday to Friday  Synthroid 75 mcg (0.075 mg) oral tablet: 1 tab(s) orally once a day  Sat/ Sun

## 2020-06-18 NOTE — PROGRESS NOTE ADULT - REASON FOR ADMISSION
hyponatremia

## 2020-06-18 NOTE — DISCHARGE NOTE PROVIDER - CARE PROVIDER_API CALL
Morris Peerz)  Internal Medicine  [address] [place] [address]  Phone: [phone]  Fax: [phone]  Follow Up Time:     Ravin Alvarado)  Internal Medicine; Pulmonary Disease  [address] [place] [address]  Phone: [phone]  Fax: [phone]  Follow Up Time:     Bev Richmond  INTERNAL MEDICINE  [address] [place] [address]  Phone: [phone]  Fax: [phone]  Follow Up Time:     Lisa Gupta  INTERNAL MEDICINE  [address] [place] [address]  Phone: [phone]  Fax: [phone]  Follow Up Time:

## 2020-06-18 NOTE — PROGRESS NOTE ADULT - SUBJECTIVE AND OBJECTIVE BOX
Problem List:  Hyponatremia    PAST MEDICAL & SURGICAL HISTORY:  HTN (hypertension)  S/P  section      No Known Allergies      MEDICATIONS  (STANDING):  budesonide 160 MICROgram(s)/formoterol 4.5 MICROgram(s) Inhaler 2 Puff(s) Inhalation two times a day  cefTRIAXone   IVPB 1000 milliGRAM(s) IV Intermittent every 24 hours  chlorhexidine 2% Cloths 1 Application(s) Topical daily  enoxaparin Injectable 40 milliGRAM(s) SubCutaneous daily  lactulose Syrup 10 Gram(s) Oral once  levothyroxine 50 MICROGram(s) Oral <User Schedule>  levothyroxine 75 MICROGram(s) Oral <User Schedule>  metoprolol tartrate 12.5 milliGRAM(s) Oral two times a day  pantoprazole    Tablet 40 milliGRAM(s) Oral before breakfast  predniSONE   Tablet 40 milliGRAM(s) Oral daily  senna 2 Tablet(s) Oral at bedtime  sodium chloride 0.9% with potassium chloride 20 mEq/L 1000 milliLiter(s) (50 mL/Hr) IV Continuous <Continuous>    MEDICATIONS  (PRN):  artificial  tears Solution 1 Drop(s) Both EYES three times a day PRN Dry Eyes                            11.8   4.30  )-----------( 216      ( 2020 06:43 )             34.1     06-18    132<L>  |  100  |  15  ----------------------------<  124<H>  5.1   |  24  |  0.58    Ca    7.5<L>      2020 06:43  Phos  2.6     -18  Mg     2.0     18    TPro  6.4  /  Alb  3.1<L>  /  TBili  0.5  /  DBili  x   /  AST  29  /  ALT  35  /  AlkPhos  67  -18        Potassium, Random Urine: 39 mmol/L ( @ 15:53)  Sodium, Random Urine: 24 mmol/L ( @ 15:53)  Osmolality, Random Urine: 506 mos/kg ( @ 15:53)  Sodium, Random Urine: 29 mmol/L (06-15 @ 17:35)  Osmolality, Random Urine: 170 mos/kg (06-15 @ 17:35)  Creatinine, Random Urine: 17 mg/dL (06-15 @ 17:35)  Chloride, Random Urine: 31 mmol/L (06-15 @ 17:35)  Potassium, Random Urine: 13 mmol/L (06-15 @ 17:35)  Potassium, Random Urine: 13 mmol/L (06-15 @ 17:35)  Osmolality, Random Urine: 158 mos/kg (06-15 @ 11:51)  Sodium, Random Urine: 46 mmol/L (06-15 @ 11:51)  Osmolality, Random Urine: 389 mos/kg ( @ 18:30)  Sodium, Random Urine: 64 mmol/L ( @ 18:30)  Osmolality, Random Urine: 372 mos/kg ( @ 12:46)  Sodium, Random Urine: 87 mmol/L ( @ 12:45)  Creatinine, Random Urine: 21 mg/dL ( @ 12:45)  Sodium, Random Urine: 87 mmol/L ( @ 08:40)  Potassium, Random Urine: 41 mmol/L ( @ 08:40)  Osmolality, Random Urine: 358 mos/kg ( @ 08:40)  Sodium, Random Urine: 66 mmol/L ( @ 01:17)  Potassium, Random Urine: 58 mmol/L ( @ 01:17)  Osmolality, Random Urine: 373 mos/kg ( @ 01:17)  Sodium, Random Urine: 19 mmol/L ( @ 20:23)  Osmolality, Random Urine: 109 mos/kg ( @ 20:23)  Potassium, Random Urine: 8 mmol/L ( @ 20:23)  Potassium, Random Urine: 7 mmol/L ( @ 17:33)  Osmolality, Random Urine: 110 mos/kg ( @ 17:33)  Sodium, Random Urine: 17 mmol/L ( @ 17:27)  Creatinine, Random Urine: <13 mg/dL ( @ 17:27)  Chloride, Random Urine: 13 mmol/L ( @ 17:27)      REVIEW OF SYSTEMS:  General: no fever no chills, no weight loss.  EYES/ENT: No visual changes;  No vertigo, no headache.  NECK: No pain or stiffness  RESPIRATORY: No cough, wheezing, hemoptysis; No shortness of breath  CARDIOVASCULAR: No chest pain or palpitations. No Edema  GASTROINTESTINAL: No abdominal or epigastric pain. No nausea, vomiting. No diarrhea or constipation. No melena.  GENITOURINARY: No dysuria, frequency, foamy urine, urinary urgency, incontinence or hematuria  NEUROLOGICAL: No numbness or weakness, no tremor , no dizziness.   Muscle skeletal : no joint pain and no swelling of joints and limbs.  SKIN: No itching, burning, rashes.        VITALS:  T(F): 98.1 (20 @ 05:10), Max: 98.1 (20 @ 05:10)  HR: 93 (20 @ 05:10)  BP: 150/72 (20 @ 05:10)  RR: 16 (20 @ 05:10)  SpO2: 98% (20 @ 05:10)  Wt(kg): --      PHYSICAL EXAM:  Constitutional: well developed, no diaphoresis, no distress.  Neck: No JVD, no carotid bruit, supple, no adenopathy  Respiratory: Good air entrance B/L, no wheezes, rales or rhonchi  Cardiovascular: S1, S2, RRR, no pericardial rub, no murmur  Abdomen: BS+, soft, no tenderness, no bruit  Pelvis: bladder nondistended  Extremities: No cyanosis or clubbing. No peripheral edema.

## 2020-06-18 NOTE — PROGRESS NOTE ADULT - SUBJECTIVE AND OBJECTIVE BOX
Patient is a 85y old  Female who presents with a chief complaint of hyponatremia (2020 14:07)    pt seen in icu [  ], reg med floor [ x  ], bed [x  ], chair at bedside [   ], a+o x3 [ x ], lethargic [  ],  nad [x  ]    pt c/o costipation      Allergies    No Known Allergies        Vitals    T(F): 98.1 (20 @ 05:10), Max: 98.1 (20 @ 05:10)  HR: 93 (20 @ 05:10) (82 - 102)  BP: 150/72 (20 @ 05:10) (137/84 - 150/72)  RR: 16 (20 @ 05:10) (16 - 19)  SpO2: 98% (20 @ 05:10) (98% - 98%)  Wt(kg): --  CAPILLARY BLOOD GLUCOSE          Labs                          11.8   4.78  )-----------( 209      ( 2020 08:51 )             33.6       06-17    128<L>  |  95<L>  |  7   ----------------------------<  93  3.2<L>   |  23  |  0.50    Ca    7.3<L>      2020 08:51  Phos  2.5     -  Mg     1.7     -17              .Blood Blood   @ 22:10   No growth to date.  --  --      .Blood Blood   @ 22:06   No growth to date.  --  --          Radiology Results      Meds    MEDICATIONS  (STANDING):  azithromycin   Tablet 250 milliGRAM(s) Oral every 24 hours  cefTRIAXone   IVPB 1000 milliGRAM(s) IV Intermittent every 24 hours  chlorhexidine 2% Cloths 1 Application(s) Topical daily  enoxaparin Injectable 40 milliGRAM(s) SubCutaneous daily  levothyroxine 50 MICROGram(s) Oral <User Schedule>  levothyroxine 75 MICROGram(s) Oral <User Schedule>  methylPREDNISolone sodium succinate Injectable 40 milliGRAM(s) IV Push three times a day  metoprolol tartrate 12.5 milliGRAM(s) Oral two times a day  pantoprazole    Tablet 40 milliGRAM(s) Oral before breakfast  sodium chloride 0.9% with potassium chloride 20 mEq/L 1000 milliLiter(s) (50 mL/Hr) IV Continuous <Continuous>      MEDICATIONS  (PRN):  artificial  tears Solution 1 Drop(s) Both EYES three times a day PRN Dry Eyes      Physical Exam    Neuro :  no focal deficits  Respiratory: CTA B/L  CV: RRR, S1S2, no murmurs,   Abdominal: Soft, NT, ND +BS,  Extremities: No edema, + peripheral pulses    ASSESSMENT    Hyponatremia, hypo-osmolarity, or hypo-osmolar hyponatremia   r/o pna  h/o HTN (hypertension)  S/P  section      PLAN        hold HCTZ,   cont losartan and bystolic   s/p NS 500cc  s/p DDAVP and D5   renal f/u  serum na improving   Good response to Desmopressin , increased osmolality.  Most likely cause of hyponatremia is diuretics .  Hypo po4 , reduced po intake,   Correct PO4,   Vitamin D within normal levels noted above  Repeat UA .  cxr with Probable small infiltrates off both lower sergey left greater than right noted   rept cxr with Interval improvement in bilateral lower lung infiltrates c/w 20 exam noted above.     cont rocephin and zithromax  blood cx neg noted above  pulm f/u  covid test neg noted   contact and airborne isolation d/c'd  cont albuterol inhaler   cont O2 via nasal  as needed   pt DNR/DNI/no invasive procedure   add dulcolax x1  start senna at bedtime  cont current meds Patient is a 85y old  Female who presents with a chief complaint of hyponatremia (2020 14:07)    pt seen in icu [  ], reg med floor [ x  ], bed [x  ], chair at bedside [   ], a+o x3 [ x ], lethargic [  ],  nad [x  ]    pt c/o costipation      Allergies    No Known Allergies        Vitals    T(F): 98.1 (20 @ 05:10), Max: 98.1 (20 @ 05:10)  HR: 93 (20 @ 05:10) (82 - 102)  BP: 150/72 (20 @ 05:10) (137/84 - 150/72)  RR: 16 (20 @ 05:10) (16 - 19)  SpO2: 98% (20 @ 05:10) (98% - 98%)  Wt(kg): --  CAPILLARY BLOOD GLUCOSE          Labs                          11.8   4.78  )-----------( 209      ( 2020 08:51 )             33.6       06-17    128<L>  |  95<L>  |  7   ----------------------------<  93  3.2<L>   |  23  |  0.50    Ca    7.3<L>      2020 08:51  Phos  2.5     -  Mg     1.7     -17              .Blood Blood   @ 22:10   No growth to date.  --  --      .Blood Blood   @ 22:06   No growth to date.  --  --          Radiology Results      Meds    MEDICATIONS  (STANDING):  azithromycin   Tablet 250 milliGRAM(s) Oral every 24 hours  cefTRIAXone   IVPB 1000 milliGRAM(s) IV Intermittent every 24 hours  chlorhexidine 2% Cloths 1 Application(s) Topical daily  enoxaparin Injectable 40 milliGRAM(s) SubCutaneous daily  levothyroxine 50 MICROGram(s) Oral <User Schedule>  levothyroxine 75 MICROGram(s) Oral <User Schedule>  methylPREDNISolone sodium succinate Injectable 40 milliGRAM(s) IV Push three times a day  metoprolol tartrate 12.5 milliGRAM(s) Oral two times a day  pantoprazole    Tablet 40 milliGRAM(s) Oral before breakfast  sodium chloride 0.9% with potassium chloride 20 mEq/L 1000 milliLiter(s) (50 mL/Hr) IV Continuous <Continuous>      MEDICATIONS  (PRN):  artificial  tears Solution 1 Drop(s) Both EYES three times a day PRN Dry Eyes      Physical Exam    Neuro :  no focal deficits  Respiratory: CTA B/L  CV: RRR, S1S2, no murmurs,   Abdominal: Soft, NT, ND +BS,  Extremities: No edema, + peripheral pulses    ASSESSMENT    Hyponatremia, hypo-osmolarity, or hypo-osmolar hyponatremia   r/o pna  h/o HTN (hypertension)  S/P  section      PLAN        hold HCTZ,   cont losartan and bystolic   s/p NS 500cc  s/p DDAVP and D5   renal f/u  serum na improving   Good response to Desmopressin , increased osmolality.  Most likely cause of hyponatremia is diuretics .  Hypo po4 , reduced po intake,   Correct PO4,   Vitamin D within normal levels noted above  cxr with Probable small infiltrates off both lower sergey left greater than right noted   rept cxr with Interval improvement in bilateral lower lung infiltrates c/w 20 exam noted above.     to complete rocephin in am   zithromax course completed  blood cx neg noted above  pulm f/u  covid test neg noted   contact and airborne isolation d/c'd  cont albuterol inhaler   cont O2 via nasal  as needed   pt DNR/DNI/no invasive procedure   lactulose 15 ml x1  cont senna at bedtime   phys tx eval  cont current meds   d/c plan if cleared by renal Patient is a 85y old  Female who presents with a chief complaint of hyponatremia (2020 14:07)    pt seen in icu [  ], reg med floor [ x  ], bed [x  ], chair at bedside [   ], a+o x3 [ x ], lethargic [  ],  nad [x  ]    pt c/o costipation      Allergies    No Known Allergies        Vitals    T(F): 98.1 (20 @ 05:10), Max: 98.1 (20 @ 05:10)  HR: 93 (20 @ 05:10) (82 - 102)  BP: 150/72 (20 @ 05:10) (137/84 - 150/72)  RR: 16 (20 @ 05:10) (16 - 19)  SpO2: 98% (20 @ 05:10) (98% - 98%)  Wt(kg): --  CAPILLARY BLOOD GLUCOSE          Labs                          11.8   4.78  )-----------( 209      ( 2020 08:51 )             33.6       06-17    128<L>  |  95<L>  |  7   ----------------------------<  93  3.2<L>   |  23  |  0.50    Ca    7.3<L>      2020 08:51  Phos  2.5     -  Mg     1.7     -17              .Blood Blood   @ 22:10   No growth to date.  --  --      .Blood Blood   @ 22:06   No growth to date.  --  --          Radiology Results      Meds    MEDICATIONS  (STANDING):  azithromycin   Tablet 250 milliGRAM(s) Oral every 24 hours  cefTRIAXone   IVPB 1000 milliGRAM(s) IV Intermittent every 24 hours  chlorhexidine 2% Cloths 1 Application(s) Topical daily  enoxaparin Injectable 40 milliGRAM(s) SubCutaneous daily  levothyroxine 50 MICROGram(s) Oral <User Schedule>  levothyroxine 75 MICROGram(s) Oral <User Schedule>  methylPREDNISolone sodium succinate Injectable 40 milliGRAM(s) IV Push three times a day  metoprolol tartrate 12.5 milliGRAM(s) Oral two times a day  pantoprazole    Tablet 40 milliGRAM(s) Oral before breakfast  sodium chloride 0.9% with potassium chloride 20 mEq/L 1000 milliLiter(s) (50 mL/Hr) IV Continuous <Continuous>      MEDICATIONS  (PRN):  artificial  tears Solution 1 Drop(s) Both EYES three times a day PRN Dry Eyes      Physical Exam    Neuro :  no focal deficits  Respiratory: CTA B/L  CV: RRR, S1S2, no murmurs,   Abdominal: Soft, NT, ND +BS,  Extremities: No edema, + peripheral pulses    ASSESSMENT    Hyponatremia, hypo-osmolarity, or hypo-osmolar hyponatremia   r/o pna  h/o HTN (hypertension)  S/P  section      PLAN        hold HCTZ,   cont losartan and bystolic   s/p NS 500cc  s/p DDAVP and D5   renal f/u  serum na improving   Good response to Desmopressin , increased osmolality.  Most likely cause of hyponatremia is diuretics .  Hypo po4 , reduced po intake,   Correct PO4,   Vitamin D within normal levels noted above  cxr with Probable small infiltrates off both lower sergey left greater than right noted   rept cxr with Interval improvement in bilateral lower lung infiltrates c/w 20 exam noted above.     to complete rocephin in am   zithromax course completed  blood cx neg noted above  pulm f/u  covid test neg noted   contact and airborne isolation d/c'd  cont albuterol inhaler   cont O2 via nasal  as needed   switch steroids to prednisone 40 mg daily and taperover 5 days  Symbicort /4.5 mcgs 2 puffs po BID  pt DNR/DNI/no invasive procedure   lactulose 15 ml x1  cont senna at bedtime   phys tx eval  cont current meds   d/c plan if cleared by renal

## 2020-06-18 NOTE — DISCHARGE NOTE PROVIDER - HOSPITAL COURSE
86 yo F from home, AAOX3, ambulates with cane with PMH of HTN and hypothyroidism BIBA to ED c/o feeling fatigue and generalized weakness for past 3 weeks, MADDEN and decreased appetite. Pt checked her B/P, was high 190/80.  Pt denies fever, CP, edema, palpitations, coughing, dizziness, orthopnea, abd pain, n/v/c/d. Last BM this am.     ED course, vitals noted temp 97, HR 70, /95, RR 18, SO2 97% on RA. Labs noted Na 113,  CXR noted b/l lower lobes infiltrates. s/p Zithromax and Rocephin in ED.     Patient Admitted to ICU for severe Hyponatremia and PNA.  Patient received 500 cc of normal saline, patient was seen by nephrologist. Na overcorrected in the first 6 hours from 113 to 12.  Patient received  desmopressin treatment due to rapid increased in sodium level, subsequent hyponatremia as side effect of Desmopressin followed, patient received  per hour which tapered to 50cc per hour. Na trended up to 130. Symptoms and sign were improved. Patient had episode of panic attack over night complaining of SOB, normal O2 saturation however patient had tachypnea and tachycardia, f/u CXR was taken yielded significant improvement in bilateral lower lobe infiltrates noted on 6/13/2020 with minimal residual opacity identified bilaterally. Home Bp medication were held on admission. Metoprolol 12.5 BID started for tachycardia and BP on higher side. Per nephrology hyponatremia is most likely 2/2 HCTZ and Losartan vs  salt loosing nephropathy. Fluids were held and fluid restriction dc'd. Nurse notified that to have  patient receive po fluids 2liters per day minimum. Patient tested negative for COVID twice. For hypothyroidism TSH was checked found to be WNL ,home dose synthroid resumed. For possible PNA patient was treated with Zithromax and Rocephin with significant improvement in CXR and clinics. Christy removed. 86 yo F from home, AAOX3, ambulates with cane with PMH of HTN and hypothyroidism BIBA to ED c/o feeling fatigue and generalized weakness for past 3 weeks, MADDEN and decreased appetite. Pt checked her B/P, was high 190/80.  Pt denies fever, CP, edema, palpitations, coughing, dizziness, orthopnea, abd pain, n/v/c/d. Last BM this am.     ED course, vitals noted temp 97, HR 70, /95, RR 18, SO2 97% on RA. Labs noted Na 113,  CXR noted b/l lower lobes infiltrates. s/p Zithromax and Rocephin in ED.     Patient Admitted to ICU for severe Hyponatremia and PNA.  Patient received 500 cc of normal saline, patient was seen by nephrologist. Na overcorrected in the first 6 hours from 113 to 12.  Patient received  desmopressin treatment due to rapid increased in sodium level, subsequent hyponatremia as side effect of Desmopressin followed, patient received  per hour which tapered to 50cc per hour. Na trended up to 130. Symptoms and sign were improved. Patient had episode of panic attack over night complaining of SOB, normal O2 saturation however patient had tachypnea and tachycardia, f/u CXR was taken yielded significant improvement in bilateral lower lobe infiltrates noted on 6/13/2020 with minimal residual opacity identified bilaterally. Home Bp medication were held on admission. Metoprolol 12.5 BID started for tachycardia and BP on higher side. Per nephrology hyponatremia is most likely 2/2 HCTZ and Losartan vs  salt loosing nephropathy. Fluids were held and fluid restriction dc'd. Nurse notified that to have  patient receive po fluids 2liters per day minimum. Patient tested negative for COVID twice. For hypothyroidism TSH was checked found to be WNL ,home dose synthroid resumed. For possible PNA patient was treated with Zithromax and Rocephin with significant improvement in CXR and clinics. Christy removed.     Sodium improved. recommended to follow up as outpatient.    Given patient's improved clinical status and current hemodynamic stability, decision was made to discharge the patient.    Patient is stable for discharge per attending and is advised to follow up with PCP as outpatient    Please refer to patient's complete medical chart with documents for a full hospital course, for this is only a brief summary.

## 2020-06-18 NOTE — DISCHARGE NOTE NURSING/CASE MANAGEMENT/SOCIAL WORK - PATIENT PORTAL LINK FT
You can access the FollowMyHealth Patient Portal offered by [facility] by registering at the following website: [email or web address]. By joining [employer]’s FollowMyHealth portal, you will also be able to view your health information using other applications (apps) compatible with our system.

## 2020-06-18 NOTE — PROGRESS NOTE ADULT - ASSESSMENT
Hyponatremia - sodium improved, patient was placed on steroids  Mild hyperkalemia , post K supplement  DC IV fluids and K supplement      Hypopo4 improved with supplements  Vitamin D level adequate  Most likely cause is reduced po intake and intracellular shift with feeding .    There is Glycosuria in urine , cause of abnormal glucose absorption in proximal tubule not clear, can follow as an outpatient.  Urine IF was ordered.

## 2020-06-18 NOTE — PROGRESS NOTE ADULT - NSHPATTENDINGPLANDISCUSS_GEN_ALL_CORE
medical team and the patient
the patient and medical team
icu team on rounds and medicine attending
icu team
icu team on rounds

## 2020-06-18 NOTE — PROGRESS NOTE ADULT - SUBJECTIVE AND OBJECTIVE BOX
Patient is a 85y old  Female who presents with a chief complaint of hyponatremia (2020 06:29)  Awake, alert, comfortable in bed in NAD    INTERVAL HPI/OVERNIGHT EVENTS:      VITAL SIGNS:  T(F): 98.1 (20 @ 05:10)  HR: 93 (20 @ 05:10)  BP: 150/72 (20 @ 05:10)  RR: 16 (20 @ 05:10)  SpO2: 98% (20 @ 05:10)  Wt(kg): --  I&O's Detail          REVIEW OF SYSTEMS:    CONSTITUTIONAL:  No fevers, chills, sweats    HEENT:  Eyes:  No diplopia or blurred vision. ENT:  No earache, sore throat or runny nose.    CARDIOVASCULAR:  No pressure, squeezing, tightness, or heaviness about the chest; no palpitations.    RESPIRATORY:  Per HPI    GASTROINTESTINAL:  No abdominal pain, nausea, vomiting or diarrhea.    GENITOURINARY:  No dysuria, frequency or urgency.    NEUROLOGIC:  No paresthesias, fasciculations, seizures or weakness.    PSYCHIATRIC:  No disorder of thought or mood.      PHYSICAL EXAM:    Constitutional: Well developed and nourished  Eyes:Perrla  ENMT: normal  Neck:supple  Respiratory: Rales bilaterally  Cardiovascular: S1 S2 regular  Gastrointestinal: Soft, Non tender  Extremities: No edema  Vascular:normal  Neurological:Awake, alert,Ox3  Musculoskeletal:Normal      MEDICATIONS  (STANDING):  budesonide 160 MICROgram(s)/formoterol 4.5 MICROgram(s) Inhaler 2 Puff(s) Inhalation two times a day  cefTRIAXone   IVPB 1000 milliGRAM(s) IV Intermittent every 24 hours  chlorhexidine 2% Cloths 1 Application(s) Topical daily  enoxaparin Injectable 40 milliGRAM(s) SubCutaneous daily  lactulose Syrup 10 Gram(s) Oral once  levothyroxine 50 MICROGram(s) Oral <User Schedule>  levothyroxine 75 MICROGram(s) Oral <User Schedule>  metoprolol tartrate 12.5 milliGRAM(s) Oral two times a day  pantoprazole    Tablet 40 milliGRAM(s) Oral before breakfast  predniSONE   Tablet 40 milliGRAM(s) Oral daily  senna 2 Tablet(s) Oral at bedtime  sodium chloride 0.9% with potassium chloride 20 mEq/L 1000 milliLiter(s) (50 mL/Hr) IV Continuous <Continuous>    MEDICATIONS  (PRN):  artificial  tears Solution 1 Drop(s) Both EYES three times a day PRN Dry Eyes      Allergies    No Known Allergies    Intolerances        LABS:                        11.8   4.30  )-----------( 216      ( 2020 06:43 )             34.1     06-18    132<L>  |  100  |  15  ----------------------------<  124<H>  5.1   |  24  |  0.58    Ca    7.5<L>      2020 06:43  Phos  2.6     06-18  Mg     2.0     06-18    TPro  6.4  /  Alb  3.1<L>  /  TBili  0.5  /  DBili  x   /  AST  29  /  ALT  35  /  AlkPhos  67  06-18      Urinalysis Basic - ( 2020 16:12 )    Color: Yellow / Appearance: Clear / S.015 / pH: x  Gluc: x / Ketone: Trace  / Bili: Negative / Urobili: Negative   Blood: x / Protein: 15 / Nitrite: Negative   Leuk Esterase: Negative / RBC: >50 /HPF / WBC 3-5 /HPF   Sq Epi: x / Non Sq Epi: Occasional /HPF / Bacteria: Trace /HPF            CAPILLARY BLOOD GLUCOSE      POCT Blood Glucose.: 124 mg/dL (2020 07:53)    pro-bnp --  @ 16:46     d-dimer 179  06-13 @ 16:46  pro-bnp 505 - @ 14:13     d-dimer --   @ 14:13      RADIOLOGY & ADDITIONAL TESTS:    CXR:  < from: Xray Chest 1 View- PORTABLE-Urgent (20 @ 07:55) >  FINDINGS: Heart size appears within normal limits. No hilar or superior mediastinal abnormalities are identified. Bilateral lower lobe infiltrates noted on 2020 has significantly improved with minimal residual opacity identified bilaterally.There is no evidence for interval development of lobar consolidation. No pleural effusion or pneumothorax is demonstrated. No mediastinal shift noted. No osseous fractures identified.    IMPRESSION: As above.    < end of copied text >    Ct scan chest:    ekg;    echo:

## 2020-06-18 NOTE — DISCHARGE NOTE PROVIDER - PROVIDER TOKENS
PROVIDER:[TOKEN:[[id number]]],PROVIDER:[TOKEN:[[id number]]],PROVIDER:[TOKEN:[[id number]]],PROVIDER:[TOKEN:[[id number]]]

## 2020-06-18 NOTE — DISCHARGE NOTE PROVIDER - NSDCCPCAREPLAN_GEN_ALL_CORE_FT
PRINCIPAL DISCHARGE DIAGNOSIS  Diagnosis: Hyponatremia  Assessment and Plan of Treatment: You were found to have low sodium levels. you were initially admitted to ICU for close monitoring later transferred to floor. You were given Iv fluids which corrected the sodium. you are recommended to drink plenty fluids and stay hydrated. You are recommended to follow up with nephrologist as out patient.      SECONDARY DISCHARGE DIAGNOSES  Diagnosis: COPD (chronic obstructive pulmonary disease)  Assessment and Plan of Treatment: COPD   You have history of COPD. Continue taking medications as prescribed. Follow up with pulmonologist as outpatient    Diagnosis: PNA (pneumonia)  Assessment and Plan of Treatment: You were found to have pneumonia. You were given Iv antibiotics during hospital stay. Blood cultures were negative. you are being sent home with 2 more days of oral antibiotics.    Diagnosis: HTN (hypertension)  Assessment and Plan of Treatment: Your blood pressure was well-controlled during your admission. Continue with your current regimen of anti-hypertensive medications as mentioned in your discharge summary and ensure that you follow up with your primary care provider for further recommendations and monitoring. (hypertension)

## 2020-06-20 LAB — PHOSPHATE 24H UR-MCNC: 142.1 MG/DL — SIGNIFICANT CHANGE UP

## 2020-06-22 LAB — INTERPRETATION 24H UR IFE-IMP: SIGNIFICANT CHANGE UP

## 2020-08-17 ENCOUNTER — APPOINTMENT (OUTPATIENT)
Dept: ENDOCRINOLOGY | Facility: CLINIC | Age: 85
End: 2020-08-17
Payer: MEDICARE

## 2020-08-17 VITALS
HEART RATE: 71 BPM | BODY MASS INDEX: 17.85 KG/M2 | SYSTOLIC BLOOD PRESSURE: 203 MMHG | OXYGEN SATURATION: 97 % | DIASTOLIC BLOOD PRESSURE: 97 MMHG | WEIGHT: 97 LBS | TEMPERATURE: 97.6 F | HEIGHT: 62 IN | RESPIRATION RATE: 16 BRPM

## 2020-08-17 PROCEDURE — 99214 OFFICE O/P EST MOD 30 MIN: CPT

## 2020-08-17 NOTE — ASSESSMENT
[FreeTextEntry1] : The patient is hypertensive\par Advised to call her PCP ASAP\par The TSH was borderline elevated\par Will increase the dose of the Levothyroxine slightly\par Will repeat the US thyroid\par Will repeat the thyroid tests in 4 months

## 2020-08-17 NOTE — REASON FOR VISIT
[Hypothyroidism] : hypothyroidism [Thyroid nodule/ MNG] : thyroid nodule/ MNG [Follow - Up] : a follow-up visit

## 2020-08-17 NOTE — PHYSICAL EXAM
[Thyroid Not Enlarged] : the thyroid was not enlarged [No Acute Distress] : no acute distress [No Neck Mass] : no neck mass was observed [Alert] : alert [Normal PMI] : the apical impulse was normal [Clear to Auscultation] : lungs were clear to auscultation bilaterally [No Respiratory Distress] : no respiratory distress [Normal Rate] : heart rate was normal

## 2020-09-01 PROBLEM — I10 ESSENTIAL (PRIMARY) HYPERTENSION: Chronic | Status: ACTIVE | Noted: 2020-06-13

## 2020-11-19 ENCOUNTER — APPOINTMENT (OUTPATIENT)
Dept: ENDOCRINOLOGY | Facility: CLINIC | Age: 85
End: 2020-11-19
Payer: MEDICARE

## 2020-11-19 VITALS
TEMPERATURE: 98.1 F | WEIGHT: 102 LBS | HEIGHT: 62 IN | DIASTOLIC BLOOD PRESSURE: 99 MMHG | RESPIRATION RATE: 16 BRPM | SYSTOLIC BLOOD PRESSURE: 175 MMHG | HEART RATE: 78 BPM | OXYGEN SATURATION: 97 % | BODY MASS INDEX: 18.77 KG/M2

## 2020-11-19 PROCEDURE — 99214 OFFICE O/P EST MOD 30 MIN: CPT

## 2020-11-19 PROCEDURE — 83036 HEMOGLOBIN GLYCOSYLATED A1C: CPT | Mod: QW

## 2020-11-19 PROCEDURE — 82962 GLUCOSE BLOOD TEST: CPT

## 2020-11-19 NOTE — ASSESSMENT
[FreeTextEntry1] : The patient is clinically euthyroid\par The US thyroid is stable\par Will continue the same treatment for the hypothyroidism\par The FBS was elevated\par Will repeat the blood tests\par Given a low CHO low fat diet

## 2020-11-19 NOTE — REASON FOR VISIT
[Follow - Up] : a follow-up visit [Hypothyroidism] : hypothyroidism [Osteoporosis] : osteoporosis [Thyroid nodule/ MNG] : thyroid nodule/ MNG

## 2020-11-19 NOTE — DATA REVIEWED
[FreeTextEntry1] : The patient had an elevated FBS. The TSH and free T4 were normal. The US thyroid was unremarkable. The PPS was 108 mg/dl. The HbA1c was 5.1%.

## 2020-11-19 NOTE — HISTORY OF PRESENT ILLNESS
[FreeTextEntry1] : Patient is doing well, denies feeling tired, having cold intolerance, or palpitations. No dryness of the skin or hair loss. No chest pain or SOB. Taking the Levothyroxine regularly ½ hour before breakfast. Her weight has not changed.  No history of neck radiation. The thyroid tests are within normal limits. The US thyroid is unchanged. Her FBS was elevated.

## 2020-11-20 LAB
GLUCOSE BLDC GLUCOMTR-MCNC: 108
HBA1C MFR BLD HPLC: 5.1

## 2020-12-07 ENCOUNTER — APPOINTMENT (OUTPATIENT)
Dept: ENDOCRINOLOGY | Facility: CLINIC | Age: 85
End: 2020-12-07
Payer: MEDICARE

## 2020-12-07 VITALS — DIASTOLIC BLOOD PRESSURE: 81 MMHG | SYSTOLIC BLOOD PRESSURE: 132 MMHG | HEART RATE: 60 BPM

## 2020-12-07 VITALS
OXYGEN SATURATION: 96 % | DIASTOLIC BLOOD PRESSURE: 81 MMHG | TEMPERATURE: 98.2 F | HEART RATE: 65 BPM | BODY MASS INDEX: 18.77 KG/M2 | RESPIRATION RATE: 16 BRPM | SYSTOLIC BLOOD PRESSURE: 155 MMHG | HEIGHT: 62 IN | WEIGHT: 102 LBS

## 2020-12-07 PROCEDURE — 96372 THER/PROPH/DIAG INJ SC/IM: CPT

## 2020-12-07 RX ORDER — DENOSUMAB 60 MG/ML
60 INJECTION SUBCUTANEOUS
Qty: 1 | Refills: 0 | Status: COMPLETED | OUTPATIENT
Start: 2020-12-07

## 2020-12-07 RX ADMIN — DENOSUMAB 0 MG/ML: 60 INJECTION SUBCUTANEOUS at 00:00

## 2021-02-23 ENCOUNTER — APPOINTMENT (OUTPATIENT)
Dept: ENDOCRINOLOGY | Facility: CLINIC | Age: 86
End: 2021-02-23
Payer: MEDICARE

## 2021-02-23 VITALS
BODY MASS INDEX: 19.14 KG/M2 | DIASTOLIC BLOOD PRESSURE: 91 MMHG | WEIGHT: 104 LBS | HEART RATE: 62 BPM | HEIGHT: 62 IN | SYSTOLIC BLOOD PRESSURE: 183 MMHG | OXYGEN SATURATION: 98 % | TEMPERATURE: 97.8 F | RESPIRATION RATE: 16 BRPM

## 2021-02-23 PROCEDURE — 99214 OFFICE O/P EST MOD 30 MIN: CPT

## 2021-02-23 NOTE — ASSESSMENT
[FreeTextEntry1] : The osteoporosis remains the same\par Will continue Prolia 60 mg every 6 months\par The patient is clinically euthyroid\par Last year the US thyroid revealed small nodule\par Will continue same treatment\par The Prediabetes is stable\par Advised to continue the diet and trying to walk regularly\par

## 2021-02-23 NOTE — HISTORY OF PRESENT ILLNESS
[FreeTextEntry1] : Doing well, the DEXA scan still shows osteoporosis but it is stable. The thyroid tests are normal.The Prediabetes is also stable, the HbA1c is now normal. The BS on AM was not fasting. Denies cold intolerance, dryness of the skin or hair loss. Taking the Levothyroxine regularly.

## 2021-05-17 ENCOUNTER — APPOINTMENT (OUTPATIENT)
Dept: ENDOCRINOLOGY | Facility: CLINIC | Age: 86
End: 2021-05-17
Payer: MEDICARE

## 2021-05-17 VITALS — SYSTOLIC BLOOD PRESSURE: 121 MMHG | DIASTOLIC BLOOD PRESSURE: 69 MMHG

## 2021-05-17 PROCEDURE — 96372 THER/PROPH/DIAG INJ SC/IM: CPT

## 2021-05-17 RX ORDER — DENOSUMAB 60 MG/ML
60 INJECTION SUBCUTANEOUS
Qty: 1 | Refills: 0 | Status: COMPLETED | OUTPATIENT
Start: 2021-05-17

## 2021-05-17 RX ADMIN — DENOSUMAB 0 MG/ML: 60 INJECTION SUBCUTANEOUS at 00:00

## 2021-06-21 ENCOUNTER — APPOINTMENT (OUTPATIENT)
Dept: ENDOCRINOLOGY | Facility: CLINIC | Age: 86
End: 2021-06-21
Payer: MEDICARE

## 2021-06-21 VITALS
TEMPERATURE: 98.1 F | RESPIRATION RATE: 16 BRPM | HEIGHT: 62 IN | DIASTOLIC BLOOD PRESSURE: 91 MMHG | WEIGHT: 104 LBS | OXYGEN SATURATION: 96 % | BODY MASS INDEX: 19.14 KG/M2 | SYSTOLIC BLOOD PRESSURE: 167 MMHG | HEART RATE: 92 BPM

## 2021-06-21 VITALS — SYSTOLIC BLOOD PRESSURE: 179 MMHG | DIASTOLIC BLOOD PRESSURE: 96 MMHG

## 2021-06-21 PROCEDURE — 99214 OFFICE O/P EST MOD 30 MIN: CPT

## 2021-06-21 RX ORDER — LISINOPRIL 10 MG/1
10 TABLET ORAL DAILY
Qty: 100 | Refills: 0 | Status: ACTIVE | COMMUNITY

## 2021-06-21 NOTE — HISTORY OF PRESENT ILLNESS
[FreeTextEntry1] : Patient feels well , she is asymptomatic. Her weight has not changed. She walks regularly and follows the diet. The FBS in the laboratory was 110 mg/dl and the HbA1c was 5.0 %. The lipid panel was within normal limits. Denies cold intolerance or palpitations. Her TSH was normal as well as the Free T4. Her Vitamin D was fine.

## 2021-06-21 NOTE — ASSESSMENT
[FreeTextEntry1] : The patient is clinically euthyroid\par The US thyroid was fine\par Will continue same treatment\par Her BP is elevated\par Advised to follow a low CHO, low salt diet and walk regularly\par To see her PCP for increased high blood pressure\par She is getting Prolia for osteoporosis

## 2021-10-21 ENCOUNTER — APPOINTMENT (OUTPATIENT)
Dept: ENDOCRINOLOGY | Facility: CLINIC | Age: 86
End: 2021-10-21
Payer: MEDICARE

## 2021-10-21 VITALS
TEMPERATURE: 97.4 F | SYSTOLIC BLOOD PRESSURE: 175 MMHG | HEIGHT: 62 IN | RESPIRATION RATE: 16 BRPM | DIASTOLIC BLOOD PRESSURE: 73 MMHG | BODY MASS INDEX: 18.77 KG/M2 | OXYGEN SATURATION: 96 % | HEART RATE: 61 BPM | WEIGHT: 102 LBS

## 2021-10-21 PROCEDURE — 99214 OFFICE O/P EST MOD 30 MIN: CPT

## 2021-10-21 NOTE — ASSESSMENT
[FreeTextEntry1] : Patient is clinically euthyroid\par Her weight has decreased\par Will continue the same thyroid medication\par Will order a new US thyroid before next visit\par Will repeat the thyroid function tests before next visit\par The FBS was elevated\par Advised to follow a low CHO, low fat diet\par Advised to walk regularly.\par Advised to see the Nephrologist her creatinine is elevated

## 2021-10-21 NOTE — DATA REVIEWED
[FreeTextEntry1] : The FBS has increased slightly. The renal function remains abnormal. The TSH and free t4 were normal.

## 2021-10-21 NOTE — HISTORY OF PRESENT ILLNESS
[FreeTextEntry1] : Patient is doing well, denies feeling tired, having cold intolerance, or palpitations. She is not sleeping well. Denies dryness of the skin or hair loss. She denies chest pain or SOB. Taking the Levothyroxine regularly ½ hour before breakfast. Her weight has decreased.  The thyroid tests are within normal limits. No recent US thyroid\par

## 2021-10-28 NOTE — PHYSICAL THERAPY INITIAL EVALUATION ADULT - PRECAUTIONS/LIMITATIONS, REHAB EVAL
fall precautions Azathioprine Pregnancy And Lactation Text: This medication is Pregnancy Category D and isn't considered safe during pregnancy. It is unknown if this medication is excreted in breast milk.

## 2021-11-16 ENCOUNTER — APPOINTMENT (OUTPATIENT)
Dept: ENDOCRINOLOGY | Facility: CLINIC | Age: 86
End: 2021-11-16
Payer: MEDICARE

## 2021-11-16 VITALS
BODY MASS INDEX: 18.77 KG/M2 | WEIGHT: 102 LBS | HEIGHT: 62 IN | DIASTOLIC BLOOD PRESSURE: 82 MMHG | SYSTOLIC BLOOD PRESSURE: 173 MMHG | OXYGEN SATURATION: 96 % | TEMPERATURE: 97.6 F | HEART RATE: 72 BPM | RESPIRATION RATE: 16 BRPM

## 2021-11-16 PROCEDURE — 96372 THER/PROPH/DIAG INJ SC/IM: CPT

## 2021-11-16 RX ORDER — DENOSUMAB 60 MG/ML
60 INJECTION SUBCUTANEOUS
Qty: 1 | Refills: 0 | Status: COMPLETED | OUTPATIENT
Start: 2021-11-16

## 2021-11-16 RX ADMIN — DENOSUMAB 0 MG/ML: 60 INJECTION SUBCUTANEOUS at 00:00

## 2022-03-01 ENCOUNTER — APPOINTMENT (OUTPATIENT)
Dept: ENDOCRINOLOGY | Facility: CLINIC | Age: 87
End: 2022-03-01
Payer: MEDICARE

## 2022-03-01 VITALS
HEART RATE: 57 BPM | TEMPERATURE: 97.8 F | RESPIRATION RATE: 16 BRPM | HEIGHT: 62 IN | OXYGEN SATURATION: 97 % | SYSTOLIC BLOOD PRESSURE: 164 MMHG | WEIGHT: 104 LBS | DIASTOLIC BLOOD PRESSURE: 77 MMHG | BODY MASS INDEX: 19.14 KG/M2

## 2022-03-01 PROCEDURE — 99214 OFFICE O/P EST MOD 30 MIN: CPT

## 2022-03-01 NOTE — ASSESSMENT
[FreeTextEntry1] : Patient is clinically euthyroid\par Her weight is stable\par Her US thyroid is unchanged\par Will continue the same thyroid medication\par The Prediabetes is stable\par Will repeat the thyroid function tests before next visit\par

## 2022-03-01 NOTE — DATA REVIEWED
[FreeTextEntry1] : The FBS was elevated with normal HbA1c. The lipid panel was fine. The TSH and free t4 were normal. The US thyroid was unchanged.

## 2022-06-03 ENCOUNTER — APPOINTMENT (OUTPATIENT)
Dept: ENDOCRINOLOGY | Facility: CLINIC | Age: 87
End: 2022-06-03
Payer: MEDICARE

## 2022-06-03 VITALS
OXYGEN SATURATION: 97 % | SYSTOLIC BLOOD PRESSURE: 179 MMHG | HEART RATE: 70 BPM | BODY MASS INDEX: 19.14 KG/M2 | RESPIRATION RATE: 16 BRPM | DIASTOLIC BLOOD PRESSURE: 93 MMHG | TEMPERATURE: 97.9 F | HEIGHT: 62 IN | WEIGHT: 104 LBS

## 2022-06-03 PROCEDURE — 96372 THER/PROPH/DIAG INJ SC/IM: CPT

## 2022-06-03 RX ORDER — DENOSUMAB 60 MG/ML
60 INJECTION SUBCUTANEOUS
Qty: 1 | Refills: 0 | Status: COMPLETED | OUTPATIENT
Start: 2022-06-03

## 2022-06-03 RX ADMIN — DENOSUMAB 0 MG/ML: 60 INJECTION SUBCUTANEOUS at 00:00

## 2022-07-01 ENCOUNTER — APPOINTMENT (OUTPATIENT)
Dept: ENDOCRINOLOGY | Facility: CLINIC | Age: 87
End: 2022-07-01

## 2022-07-01 VITALS
DIASTOLIC BLOOD PRESSURE: 89 MMHG | RESPIRATION RATE: 16 BRPM | OXYGEN SATURATION: 96 % | SYSTOLIC BLOOD PRESSURE: 188 MMHG | HEIGHT: 62 IN | TEMPERATURE: 98 F | BODY MASS INDEX: 18.58 KG/M2 | WEIGHT: 101 LBS | HEART RATE: 62 BPM

## 2022-07-01 PROCEDURE — 99214 OFFICE O/P EST MOD 30 MIN: CPT

## 2022-07-01 NOTE — ASSESSMENT
[FreeTextEntry1] : Patient is clinically euthyroid\par Her weight has decreased\par Her US thyroid is unchanged\par Will continue the same thyroid medication\par Will repeat the thyroid function tests before next visit\par To see her PCP, the creatinine is elevated\par

## 2022-07-01 NOTE — DATA REVIEWED
[FreeTextEntry1] : The TSH and free t4 were normal. The US thyroid was fine 2/22. The last DEXA scan was 2/21

## 2022-07-01 NOTE — REASON FOR VISIT
[Follow - Up] : a follow-up visit [Hypothyroidism] : hypothyroidism [Osteoporosis] : osteoporosis [Thyroid nodule/ MNG] : thyroid nodule/ MNG [Other___] : [unfilled]

## 2022-07-01 NOTE — HISTORY OF PRESENT ILLNESS
[FreeTextEntry1] : Patient is doing well, denies feeling tired, having cold intolerance, or palpitations. Denies dryness of the skin or hair loss. She denies chest pain or SOB. Taking the Levothyroxine regularly ½ hour before breakfast. Her weight has not changed.  The thyroid tests are within normal limits. The US thyroid is unchanged. \par

## 2022-11-04 ENCOUNTER — APPOINTMENT (OUTPATIENT)
Dept: ENDOCRINOLOGY | Facility: CLINIC | Age: 87
End: 2022-11-04

## 2022-11-04 VITALS
HEIGHT: 62 IN | WEIGHT: 101 LBS | TEMPERATURE: 97.9 F | HEART RATE: 81 BPM | RESPIRATION RATE: 16 BRPM | OXYGEN SATURATION: 97 % | BODY MASS INDEX: 18.58 KG/M2 | DIASTOLIC BLOOD PRESSURE: 101 MMHG | SYSTOLIC BLOOD PRESSURE: 222 MMHG

## 2022-11-04 VITALS — DIASTOLIC BLOOD PRESSURE: 82 MMHG | SYSTOLIC BLOOD PRESSURE: 196 MMHG

## 2022-11-04 PROCEDURE — 99214 OFFICE O/P EST MOD 30 MIN: CPT

## 2022-11-04 NOTE — DATA REVIEWED
[FreeTextEntry1] : Her TSH is slightly elevated with a normal free t4. the creatinine has improved. her HbA1c is normal 5.1%. Her lipid panel is normal. She has mild hyponatremia

## 2022-11-04 NOTE — ASSESSMENT
[FreeTextEntry1] : Patient is slightly hypothyroid\par Her weight is stable\par Will raise the dose of levothyroxine slightly to 75 mcg po QD\par Advised to see her PCP, her BP is elevated and she has mild hyponatremia\par

## 2022-12-08 ENCOUNTER — APPOINTMENT (OUTPATIENT)
Dept: ENDOCRINOLOGY | Facility: CLINIC | Age: 87
End: 2022-12-08

## 2022-12-08 VITALS
BODY MASS INDEX: 18.58 KG/M2 | TEMPERATURE: 98.1 F | WEIGHT: 101 LBS | RESPIRATION RATE: 16 BRPM | HEART RATE: 82 BPM | HEIGHT: 62 IN | OXYGEN SATURATION: 97 % | SYSTOLIC BLOOD PRESSURE: 193 MMHG | DIASTOLIC BLOOD PRESSURE: 100 MMHG

## 2022-12-08 PROCEDURE — 96372 THER/PROPH/DIAG INJ SC/IM: CPT

## 2022-12-08 RX ORDER — DENOSUMAB 60 MG/ML
60 INJECTION SUBCUTANEOUS
Qty: 1 | Refills: 0 | Status: COMPLETED | OUTPATIENT
Start: 2022-12-08

## 2022-12-08 RX ADMIN — DENOSUMAB 0 MG/ML: 60 INJECTION SUBCUTANEOUS at 00:00

## 2023-03-03 ENCOUNTER — APPOINTMENT (OUTPATIENT)
Dept: ENDOCRINOLOGY | Facility: CLINIC | Age: 88
End: 2023-03-03
Payer: MEDICARE

## 2023-03-03 VITALS
HEIGHT: 62 IN | OXYGEN SATURATION: 97 % | HEART RATE: 70 BPM | BODY MASS INDEX: 18.03 KG/M2 | RESPIRATION RATE: 16 BRPM | DIASTOLIC BLOOD PRESSURE: 106 MMHG | WEIGHT: 98 LBS | SYSTOLIC BLOOD PRESSURE: 185 MMHG

## 2023-03-03 PROCEDURE — 99214 OFFICE O/P EST MOD 30 MIN: CPT

## 2023-03-03 RX ORDER — LEVOTHYROXINE SODIUM 0.05 MG/1
50 TABLET ORAL
Qty: 30 | Refills: 3 | Status: COMPLETED | COMMUNITY
End: 2023-03-03

## 2023-03-03 NOTE — ASSESSMENT
[FreeTextEntry1] : Patient is clinically euthyroid\par Her weight has decreased\par Her US thyroid is unchanged\par Will continue the same thyroid medication\par Will repeat the thyroid function tests before next visit\par Will order a new DEXA scan\par She is getting Prolia SQ every 6 months\par Advised to see her PCP for the hypertension\par

## 2023-03-03 NOTE — HISTORY OF PRESENT ILLNESS
[FreeTextEntry1] : Patient is doing well, she is feeling tired, not having cold intolerance, or palpitations. Denies dryness of the skin or hair loss. She denies chest pain or SOB. Taking the Levothyroxine regularly ½ hour before breakfast. Her weight has not changed.  \par

## 2023-03-03 NOTE — DATA REVIEWED
[FreeTextEntry1] : The FBS was slightly elevated but the HbA1c was normal. the TSH and free t4 were normal. The lipid panel was fine. The renal function was borderline elevated.

## 2023-05-05 ENCOUNTER — APPOINTMENT (OUTPATIENT)
Dept: ENDOCRINOLOGY | Facility: CLINIC | Age: 88
End: 2023-05-05
Payer: MEDICARE

## 2023-05-05 VITALS
HEIGHT: 62 IN | SYSTOLIC BLOOD PRESSURE: 169 MMHG | RESPIRATION RATE: 16 BRPM | TEMPERATURE: 98 F | WEIGHT: 100 LBS | BODY MASS INDEX: 18.4 KG/M2 | HEART RATE: 71 BPM | OXYGEN SATURATION: 94 % | DIASTOLIC BLOOD PRESSURE: 91 MMHG

## 2023-05-05 PROCEDURE — 99214 OFFICE O/P EST MOD 30 MIN: CPT | Mod: 25

## 2023-05-05 PROCEDURE — 96372 THER/PROPH/DIAG INJ SC/IM: CPT

## 2023-05-05 RX ORDER — DENOSUMAB 60 MG/ML
60 INJECTION SUBCUTANEOUS
Qty: 1 | Refills: 0 | Status: COMPLETED | OUTPATIENT
Start: 2023-05-05

## 2023-05-05 RX ADMIN — DENOSUMAB 0 MG/ML: 60 INJECTION SUBCUTANEOUS at 00:00

## 2023-07-07 ENCOUNTER — APPOINTMENT (OUTPATIENT)
Dept: ENDOCRINOLOGY | Facility: CLINIC | Age: 88
End: 2023-07-07

## 2023-07-14 ENCOUNTER — APPOINTMENT (OUTPATIENT)
Dept: ENDOCRINOLOGY | Facility: CLINIC | Age: 88
End: 2023-07-14
Payer: MEDICARE

## 2023-07-14 VITALS
RESPIRATION RATE: 16 BRPM | DIASTOLIC BLOOD PRESSURE: 99 MMHG | TEMPERATURE: 97.3 F | HEART RATE: 75 BPM | OXYGEN SATURATION: 96 % | SYSTOLIC BLOOD PRESSURE: 188 MMHG | HEIGHT: 62 IN | WEIGHT: 97 LBS | BODY MASS INDEX: 17.85 KG/M2

## 2023-07-14 DIAGNOSIS — E04.2 NONTOXIC MULTINODULAR GOITER: ICD-10-CM

## 2023-07-14 PROCEDURE — 99214 OFFICE O/P EST MOD 30 MIN: CPT

## 2023-07-14 NOTE — DATA REVIEWED
[FreeTextEntry1] : The TSH and free t4 are normal. Her Na is low she is on Lisinopril. Her lipid panel was also fine.

## 2023-07-14 NOTE — ASSESSMENT
[FreeTextEntry1] : Patient is clinically euthyroid\par Her weight is stable\par Her US thyroid is unchanged\par Will continue the same thyroid medication\par Will repeat the thyroid function tests before next visit\par

## 2023-07-14 NOTE — HISTORY OF PRESENT ILLNESS
[FreeTextEntry1] : Patient has been feeling tired, not having cold intolerance, or palpitations. Denies dryness of the skin or hair loss. She denies chest pain or SOB. Taking the Levothyroxine regularly ½ hour before breakfast. Her weight has decreased.  \par

## 2023-11-01 RX ORDER — DENOSUMAB 60 MG/ML
60 INJECTION SUBCUTANEOUS
Qty: 1 | Refills: 0 | Status: ACTIVE | COMMUNITY
Start: 2019-04-02 | End: 1900-01-01

## 2023-11-17 ENCOUNTER — APPOINTMENT (OUTPATIENT)
Dept: ENDOCRINOLOGY | Facility: CLINIC | Age: 88
End: 2023-11-17
Payer: MEDICARE

## 2023-11-17 VITALS
DIASTOLIC BLOOD PRESSURE: 92 MMHG | HEIGHT: 62 IN | OXYGEN SATURATION: 96 % | BODY MASS INDEX: 17.66 KG/M2 | HEART RATE: 84 BPM | WEIGHT: 96 LBS | RESPIRATION RATE: 16 BRPM | SYSTOLIC BLOOD PRESSURE: 197 MMHG

## 2023-11-17 VITALS — DIASTOLIC BLOOD PRESSURE: 95 MMHG | SYSTOLIC BLOOD PRESSURE: 173 MMHG

## 2023-11-17 VITALS — SYSTOLIC BLOOD PRESSURE: 196 MMHG | DIASTOLIC BLOOD PRESSURE: 90 MMHG

## 2023-11-17 DIAGNOSIS — R73.03 PREDIABETES.: ICD-10-CM

## 2023-11-17 DIAGNOSIS — E03.9 HYPOTHYROIDISM, UNSPECIFIED: ICD-10-CM

## 2023-11-17 PROCEDURE — 99214 OFFICE O/P EST MOD 30 MIN: CPT

## 2023-11-17 RX ORDER — LEVOTHYROXINE SODIUM 0.07 MG/1
75 TABLET ORAL DAILY
Qty: 90 | Refills: 3 | Status: ACTIVE | COMMUNITY
Start: 2018-02-20 | End: 1900-01-01

## 2023-12-04 ENCOUNTER — NON-APPOINTMENT (OUTPATIENT)
Age: 88
End: 2023-12-04

## 2024-01-16 ENCOUNTER — NON-APPOINTMENT (OUTPATIENT)
Age: 89
End: 2024-01-16

## 2024-01-16 ENCOUNTER — APPOINTMENT (OUTPATIENT)
Dept: ENDOCRINOLOGY | Facility: CLINIC | Age: 89
End: 2024-01-16

## 2024-03-06 ENCOUNTER — NON-APPOINTMENT (OUTPATIENT)
Age: 89
End: 2024-03-06

## 2024-03-27 ENCOUNTER — NON-APPOINTMENT (OUTPATIENT)
Age: 89
End: 2024-03-27

## 2024-03-29 ENCOUNTER — APPOINTMENT (OUTPATIENT)
Dept: ENDOCRINOLOGY | Facility: CLINIC | Age: 89
End: 2024-03-29
Payer: MEDICARE

## 2024-03-29 VITALS — SYSTOLIC BLOOD PRESSURE: 188 MMHG | DIASTOLIC BLOOD PRESSURE: 110 MMHG

## 2024-03-29 VITALS
RESPIRATION RATE: 16 BRPM | TEMPERATURE: 95.5 F | SYSTOLIC BLOOD PRESSURE: 213 MMHG | HEIGHT: 62 IN | OXYGEN SATURATION: 99 % | BODY MASS INDEX: 16.75 KG/M2 | HEART RATE: 78 BPM | WEIGHT: 91 LBS | DIASTOLIC BLOOD PRESSURE: 116 MMHG

## 2024-03-29 VITALS — DIASTOLIC BLOOD PRESSURE: 80 MMHG | SYSTOLIC BLOOD PRESSURE: 170 MMHG

## 2024-03-29 VITALS — SYSTOLIC BLOOD PRESSURE: 180 MMHG | DIASTOLIC BLOOD PRESSURE: 70 MMHG

## 2024-03-29 DIAGNOSIS — M81.0 AGE-RELATED OSTEOPOROSIS W/OUT CURRENT PATHOLOGICAL FRACTURE: ICD-10-CM

## 2024-03-29 PROCEDURE — 96372 THER/PROPH/DIAG INJ SC/IM: CPT

## 2024-03-29 RX ORDER — DENOSUMAB 60 MG/ML
60 INJECTION SUBCUTANEOUS
Qty: 1 | Refills: 0 | Status: COMPLETED | OUTPATIENT
Start: 2024-03-29

## 2024-03-29 RX ADMIN — DENOSUMAB 0 MG/ML: 60 INJECTION SUBCUTANEOUS at 00:00

## 2024-09-17 ENCOUNTER — NON-APPOINTMENT (OUTPATIENT)
Age: 89
End: 2024-09-17

## 2024-09-20 ENCOUNTER — NON-APPOINTMENT (OUTPATIENT)
Age: 89
End: 2024-09-20

## 2024-10-08 ENCOUNTER — APPOINTMENT (OUTPATIENT)
Dept: ENDOCRINOLOGY | Facility: CLINIC | Age: 89
End: 2024-10-08

## 2024-10-10 ENCOUNTER — APPOINTMENT (OUTPATIENT)
Dept: ENDOCRINOLOGY | Facility: CLINIC | Age: 89
End: 2024-10-10
Payer: MEDICARE

## 2024-10-10 VITALS — SYSTOLIC BLOOD PRESSURE: 143 MMHG | DIASTOLIC BLOOD PRESSURE: 79 MMHG

## 2024-10-10 VITALS
OXYGEN SATURATION: 94 % | TEMPERATURE: 97.9 F | BODY MASS INDEX: 15.83 KG/M2 | SYSTOLIC BLOOD PRESSURE: 158 MMHG | RESPIRATION RATE: 16 BRPM | WEIGHT: 86 LBS | HEART RATE: 93 BPM | HEIGHT: 62 IN | DIASTOLIC BLOOD PRESSURE: 97 MMHG

## 2024-10-10 PROCEDURE — 96372 THER/PROPH/DIAG INJ SC/IM: CPT

## 2024-10-10 RX ORDER — DENOSUMAB 60 MG/ML
60 INJECTION SUBCUTANEOUS
Qty: 1 | Refills: 0 | Status: COMPLETED | OUTPATIENT
Start: 2024-10-10

## 2024-10-10 RX ADMIN — DENOSUMAB 0 MG/ML: 60 INJECTION SUBCUTANEOUS at 00:00

## 2024-11-05 NOTE — PHYSICAL THERAPY INITIAL EVALUATION ADULT - TRANSFER SKILLS, REHAB EVAL
"Behavioral Health Psychotherapy Progress Note    Psychotherapy Provided: Individual Psychotherapy     1. Mood disorder (HCC)            Goals addressed in session: Goal 1 and Goal 2     DATA: Yue presents today casually dressed and in an upbeat mood.  We processed her week. Therapist provided active listening and empathetic support as needed. She reports this week was better. She completed several cardiac workup tests and is feeling more reassured in regards to that. Her anxiety is overall lower this week. Jeanine was able to maintain boundaries with her adult son, and her mother. 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 We reviewed all of these as significant progress into her maintaining her own personal boundaries and communicating assertively.   During this session, this clinician used the following therapeutic modalities: Client-centered Therapy, Cognitive Behavioral Therapy, Cognitive Processing Therapy, and Motivational Interviewing    Substance Abuse was not addressed during this session. If the client is diagnosed with a co-occurring substance use disorder, please indicate any changes in the frequency or amount of use: drinks usually a few times a week/ 1-drink. Stage of change for addressing substance use diagnoses: Contemplation    ASSESSMENT:  Jeanine Zepeda presents with a Euthymic/ normal mood.     her affect is Normal range and intensity, which is congruent, with her mood and the content of the session. The client has made progress on their goals.     Jeanine Zepeda presents with a none risk of suicide, none risk of self-harm, and none risk of harm to others.    For any risk assessment that surpasses a \"low\" rating, a safety plan must be developed.    A safety plan was indicated: no  If yes, describe in detail n/a    PLAN: Between sessions, Jeanine Zepeda will continue self-care plan. At " the next session, the therapist will use Client-centered Therapy, Cognitive Behavioral Therapy, Cognitive Processing Therapy, Dialectical Behavior Therapy, and Motivational Interviewing to address anxiety/depression.    Behavioral Health Treatment Plan and Discharge Planning: Jeanine Zepeda is aware of and agrees to continue to work on their treatment plan. They have identified and are working toward their discharge goals. yes    Visit start and stop times:    11/05/24  Start Time: 1300  Stop Time: 1355  Total Visit Time: 55 minutes   independent

## 2024-11-26 ENCOUNTER — APPOINTMENT (OUTPATIENT)
Dept: ENDOCRINOLOGY | Facility: CLINIC | Age: 89
End: 2024-11-26
Payer: MEDICARE

## 2024-11-26 VITALS
TEMPERATURE: 97.6 F | RESPIRATION RATE: 16 BRPM | BODY MASS INDEX: 15.46 KG/M2 | HEART RATE: 55 BPM | OXYGEN SATURATION: 94 % | DIASTOLIC BLOOD PRESSURE: 83 MMHG | WEIGHT: 84 LBS | SYSTOLIC BLOOD PRESSURE: 153 MMHG | HEIGHT: 62 IN

## 2024-11-26 DIAGNOSIS — R73.03 PREDIABETES.: ICD-10-CM

## 2024-11-26 DIAGNOSIS — E03.9 HYPOTHYROIDISM, UNSPECIFIED: ICD-10-CM

## 2024-11-26 PROCEDURE — 99214 OFFICE O/P EST MOD 30 MIN: CPT

## 2024-11-26 PROCEDURE — G2211 COMPLEX E/M VISIT ADD ON: CPT

## 2025-01-02 NOTE — PHYSICAL THERAPY INITIAL EVALUATION ADULT - WEIGHT-BEARING RESTRICTIONS: SIT/STAND, REHAB EVAL
-  
 RADIOFREQUENCY ABLATION OPERATIVE REPORT    12/30/25    Surgeon: Lalit Larson MD    Pre-operative Diagnosis: Principal Problem:    Cervical spondylosis  Active Problems:    Cervical facet syndrome    Cervical post-laminectomy syndrome  Resolved Problems:    * No resolved hospital problems. *      Post-operative Diagnosis: Same    INDICATION:  Left  L C3-4 4-5 Radiofrequency radiofrequency neurotomy procedure is requested for therapeutic reasons. Please see H&P for details on previous treatments, examination findings, and work up. left C3-4-45 medial branch blocks are requested for diagnostic reasons.    Conservative treatment was ineffective i.e.: ice, NSAIDS, rest,     Patient is unable to perform the following ADL's: bathing, toileting, sitting, and standing     Pain Assessment: 0-10  Pain Level: 7     Pain Orientation: Left  Pain Location: Neck  Pain Descriptors: Aching (pt reports tingling to Lt arm)    Last Plain films: 2024    EXAMINATION:  Left C3-4-45 radiofrequency neurotomies.     CONSENT:  Written consent was obtained from the patient on the preprinted consent form after explaining the procedure, indications, potential complications and outcomes.  Alternative treatments were also discussed.    DISCUSSION:  The patient was sterilely prepped and draped in the usual fashion in the prone position.  “Time out” was verified for the correct patient, side, level and procedure.    SEDATION per CRNA  FACET RF  Under image-intensifier control, a 1850  mm [employer] RFK insulated radiofrequency probe with 5 mm active tips were successfully directed at the Left C3-4-45 medial branches of the dorsal rami at the target points described by LILIANA. Needle tip positions were confirmed in 3 views and sensory and motor test stimulation was performed.  The patient reported sensory stimulation at 0.0 to 0.0 volts at 50 Hz and motor stimulation at 0.0 to 0.9 volts at 2 Hz, which confirmed satisfactory sensory motor 
full weight-bearing

## 2025-03-24 ENCOUNTER — APPOINTMENT (OUTPATIENT)
Dept: ENDOCRINOLOGY | Facility: CLINIC | Age: 89
End: 2025-03-24

## 2025-04-06 ENCOUNTER — INPATIENT (INPATIENT)
Facility: HOSPITAL | Age: 89
LOS: 8 days | Discharge: NURSING, REHAB OR LONG-TERM CARE | DRG: 535 | End: 2025-04-15
Attending: INTERNAL MEDICINE | Admitting: INTERNAL MEDICINE
Payer: MEDICARE

## 2025-04-06 VITALS — RESPIRATION RATE: 18 BRPM | DIASTOLIC BLOOD PRESSURE: 59 MMHG | SYSTOLIC BLOOD PRESSURE: 107 MMHG

## 2025-04-06 DIAGNOSIS — J44.9 CHRONIC OBSTRUCTIVE PULMONARY DISEASE, UNSPECIFIED: ICD-10-CM

## 2025-04-06 DIAGNOSIS — E03.9 HYPOTHYROIDISM, UNSPECIFIED: ICD-10-CM

## 2025-04-06 DIAGNOSIS — N17.9 ACUTE KIDNEY FAILURE, UNSPECIFIED: ICD-10-CM

## 2025-04-06 DIAGNOSIS — Z98.891 HISTORY OF UTERINE SCAR FROM PREVIOUS SURGERY: Chronic | ICD-10-CM

## 2025-04-06 DIAGNOSIS — S72.009A FRACTURE OF UNSPECIFIED PART OF NECK OF UNSPECIFIED FEMUR, INITIAL ENCOUNTER FOR CLOSED FRACTURE: ICD-10-CM

## 2025-04-06 DIAGNOSIS — Z29.9 ENCOUNTER FOR PROPHYLACTIC MEASURES, UNSPECIFIED: ICD-10-CM

## 2025-04-06 DIAGNOSIS — I24.89 OTHER FORMS OF ACUTE ISCHEMIC HEART DISEASE: ICD-10-CM

## 2025-04-06 DIAGNOSIS — Q01.0 FRONTAL ENCEPHALOCELE: ICD-10-CM

## 2025-04-06 DIAGNOSIS — J47.1 BRONCHIECTASIS WITH (ACUTE) EXACERBATION: ICD-10-CM

## 2025-04-06 DIAGNOSIS — S72.002A FRACTURE OF UNSPECIFIED PART OF NECK OF LEFT FEMUR, INITIAL ENCOUNTER FOR CLOSED FRACTURE: ICD-10-CM

## 2025-04-06 DIAGNOSIS — I10 ESSENTIAL (PRIMARY) HYPERTENSION: ICD-10-CM

## 2025-04-06 LAB
ALBUMIN SERPL ELPH-MCNC: 2.5 G/DL — LOW (ref 3.5–5)
ALP SERPL-CCNC: 119 U/L — SIGNIFICANT CHANGE UP (ref 40–120)
ALT FLD-CCNC: 40 U/L DA — SIGNIFICANT CHANGE UP (ref 10–60)
ANION GAP SERPL CALC-SCNC: 6 MMOL/L — SIGNIFICANT CHANGE UP (ref 5–17)
APPEARANCE UR: CLEAR — SIGNIFICANT CHANGE UP
APTT BLD: 31.6 SEC — SIGNIFICANT CHANGE UP (ref 24.5–35.6)
AST SERPL-CCNC: 39 U/L — SIGNIFICANT CHANGE UP (ref 10–40)
BACTERIA # UR AUTO: ABNORMAL /HPF
BASOPHILS # BLD AUTO: 0.01 K/UL — SIGNIFICANT CHANGE UP (ref 0–0.2)
BASOPHILS NFR BLD AUTO: 0.1 % — SIGNIFICANT CHANGE UP (ref 0–2)
BILIRUB SERPL-MCNC: 0.5 MG/DL — SIGNIFICANT CHANGE UP (ref 0.2–1.2)
BILIRUB UR-MCNC: NEGATIVE — SIGNIFICANT CHANGE UP
BUN SERPL-MCNC: 55 MG/DL — HIGH (ref 7–18)
CALCIUM SERPL-MCNC: 11.2 MG/DL — HIGH (ref 8.4–10.5)
CHLORIDE SERPL-SCNC: 112 MMOL/L — HIGH (ref 96–108)
CK SERPL-CCNC: 95 U/L — SIGNIFICANT CHANGE UP (ref 21–215)
CO2 SERPL-SCNC: 31 MMOL/L — SIGNIFICANT CHANGE UP (ref 22–31)
COLOR SPEC: YELLOW — SIGNIFICANT CHANGE UP
CREAT SERPL-MCNC: 1.53 MG/DL — HIGH (ref 0.5–1.3)
DIFF PNL FLD: NEGATIVE — SIGNIFICANT CHANGE UP
EGFR: 32 ML/MIN/1.73M2 — LOW
EGFR: 32 ML/MIN/1.73M2 — LOW
EOSINOPHIL # BLD AUTO: 0 K/UL — SIGNIFICANT CHANGE UP (ref 0–0.5)
EOSINOPHIL NFR BLD AUTO: 0 % — SIGNIFICANT CHANGE UP (ref 0–6)
EPI CELLS # UR: PRESENT
FLUAV AG NPH QL: SIGNIFICANT CHANGE UP
FLUBV AG NPH QL: SIGNIFICANT CHANGE UP
GLUCOSE SERPL-MCNC: 206 MG/DL — HIGH (ref 70–99)
GLUCOSE UR QL: 100 MG/DL
HCT VFR BLD CALC: 43.5 % — SIGNIFICANT CHANGE UP (ref 34.5–45)
HGB BLD-MCNC: 13.5 G/DL — SIGNIFICANT CHANGE UP (ref 11.5–15.5)
IMM GRANULOCYTES NFR BLD AUTO: 0.4 % — SIGNIFICANT CHANGE UP (ref 0–0.9)
INR BLD: 0.93 RATIO — SIGNIFICANT CHANGE UP (ref 0.85–1.16)
KETONES UR-MCNC: NEGATIVE MG/DL — SIGNIFICANT CHANGE UP
LACTATE SERPL-SCNC: 2.4 MMOL/L — HIGH (ref 0.7–2)
LACTATE SERPL-SCNC: 2.6 MMOL/L — HIGH (ref 0.7–2)
LACTATE SERPL-SCNC: 2.6 MMOL/L — HIGH (ref 0.7–2)
LEUKOCYTE ESTERASE UR-ACNC: NEGATIVE — SIGNIFICANT CHANGE UP
LYMPHOCYTES # BLD AUTO: 0.7 K/UL — LOW (ref 1–3.3)
LYMPHOCYTES # BLD AUTO: 7.6 % — LOW (ref 13–44)
MCHC RBC-ENTMCNC: 27.4 PG — SIGNIFICANT CHANGE UP (ref 27–34)
MCHC RBC-ENTMCNC: 31 G/DL — LOW (ref 32–36)
MCV RBC AUTO: 88.2 FL — SIGNIFICANT CHANGE UP (ref 80–100)
MONOCYTES # BLD AUTO: 0.26 K/UL — SIGNIFICANT CHANGE UP (ref 0–0.9)
MONOCYTES NFR BLD AUTO: 2.8 % — SIGNIFICANT CHANGE UP (ref 2–14)
NEUTROPHILS # BLD AUTO: 8.26 K/UL — HIGH (ref 1.8–7.4)
NEUTROPHILS NFR BLD AUTO: 89.1 % — HIGH (ref 43–77)
NITRITE UR-MCNC: NEGATIVE — SIGNIFICANT CHANGE UP
NRBC BLD AUTO-RTO: 0 /100 WBCS — SIGNIFICANT CHANGE UP (ref 0–0)
NT-PROBNP SERPL-SCNC: 3486 PG/ML — HIGH (ref 0–450)
PH UR: 5.5 — SIGNIFICANT CHANGE UP (ref 5–8)
PLATELET # BLD AUTO: 241 K/UL — SIGNIFICANT CHANGE UP (ref 150–400)
POTASSIUM SERPL-MCNC: 3.7 MMOL/L — SIGNIFICANT CHANGE UP (ref 3.5–5.3)
POTASSIUM SERPL-SCNC: 3.7 MMOL/L — SIGNIFICANT CHANGE UP (ref 3.5–5.3)
PROT SERPL-MCNC: 7.1 G/DL — SIGNIFICANT CHANGE UP (ref 6–8.3)
PROT UR-MCNC: 30 MG/DL
PROTHROM AB SERPL-ACNC: 10.9 SEC — SIGNIFICANT CHANGE UP (ref 9.9–13.4)
RBC # BLD: 4.93 M/UL — SIGNIFICANT CHANGE UP (ref 3.8–5.2)
RBC # FLD: 14.6 % — HIGH (ref 10.3–14.5)
RBC CASTS # UR COMP ASSIST: 2 /HPF — SIGNIFICANT CHANGE UP (ref 0–4)
RSV RNA NPH QL NAA+NON-PROBE: SIGNIFICANT CHANGE UP
SARS-COV-2 RNA SPEC QL NAA+PROBE: SIGNIFICANT CHANGE UP
SODIUM SERPL-SCNC: 149 MMOL/L — HIGH (ref 135–145)
SOURCE RESPIRATORY: SIGNIFICANT CHANGE UP
SP GR SPEC: 1.02 — SIGNIFICANT CHANGE UP (ref 1–1.03)
TROPONIN I, HIGH SENSITIVITY RESULT: 109.9 NG/L — HIGH
UROBILINOGEN FLD QL: 0.2 MG/DL — SIGNIFICANT CHANGE UP (ref 0.2–1)
WBC # BLD: 9.27 K/UL — SIGNIFICANT CHANGE UP (ref 3.8–10.5)
WBC # FLD AUTO: 9.27 K/UL — SIGNIFICANT CHANGE UP (ref 3.8–10.5)
WBC UR QL: 2 /HPF — SIGNIFICANT CHANGE UP (ref 0–5)

## 2025-04-06 PROCEDURE — 72125 CT NECK SPINE W/O DYE: CPT | Mod: 26

## 2025-04-06 PROCEDURE — 70450 CT HEAD/BRAIN W/O DYE: CPT | Mod: 26

## 2025-04-06 PROCEDURE — 74176 CT ABD & PELVIS W/O CONTRAST: CPT | Mod: 26

## 2025-04-06 PROCEDURE — 93010 ELECTROCARDIOGRAM REPORT: CPT | Mod: 76

## 2025-04-06 PROCEDURE — 71250 CT THORAX DX C-: CPT | Mod: 26

## 2025-04-06 PROCEDURE — 73522 X-RAY EXAM HIPS BI 3-4 VIEWS: CPT | Mod: 26

## 2025-04-06 PROCEDURE — 73552 X-RAY EXAM OF FEMUR 2/>: CPT | Mod: 26,LT

## 2025-04-06 PROCEDURE — 99285 EMERGENCY DEPT VISIT HI MDM: CPT

## 2025-04-06 PROCEDURE — 71045 X-RAY EXAM CHEST 1 VIEW: CPT | Mod: 26

## 2025-04-06 RX ORDER — OXYCODONE HYDROCHLORIDE 30 MG/1
5 TABLET ORAL EVERY 4 HOURS
Refills: 0 | Status: DISCONTINUED | OUTPATIENT
Start: 2025-04-06 | End: 2025-04-13

## 2025-04-06 RX ORDER — LEVOTHYROXINE SODIUM 300 MCG
75 TABLET ORAL DAILY
Refills: 0 | Status: DISCONTINUED | OUTPATIENT
Start: 2025-04-06 | End: 2025-04-08

## 2025-04-06 RX ORDER — OXYCODONE HYDROCHLORIDE 30 MG/1
2.5 TABLET ORAL EVERY 4 HOURS
Refills: 0 | Status: DISCONTINUED | OUTPATIENT
Start: 2025-04-06 | End: 2025-04-13

## 2025-04-06 RX ORDER — SODIUM CHLORIDE 9 G/1000ML
1000 INJECTION, SOLUTION INTRAVENOUS ONCE
Refills: 0 | Status: COMPLETED | OUTPATIENT
Start: 2025-04-06 | End: 2025-04-06

## 2025-04-06 RX ORDER — SENNA 187 MG
2 TABLET ORAL AT BEDTIME
Refills: 0 | Status: DISCONTINUED | OUTPATIENT
Start: 2025-04-06 | End: 2025-04-15

## 2025-04-06 RX ORDER — ACETAMINOPHEN 500 MG/5ML
650 LIQUID (ML) ORAL EVERY 6 HOURS
Refills: 0 | Status: DISCONTINUED | OUTPATIENT
Start: 2025-04-06 | End: 2025-04-10

## 2025-04-06 RX ORDER — ASPIRIN 325 MG
324 TABLET ORAL ONCE
Refills: 0 | Status: COMPLETED | OUTPATIENT
Start: 2025-04-06 | End: 2025-04-06

## 2025-04-06 RX ORDER — AMLODIPINE BESYLATE 10 MG/1
5 TABLET ORAL DAILY
Refills: 0 | Status: DISCONTINUED | OUTPATIENT
Start: 2025-04-06 | End: 2025-04-15

## 2025-04-06 RX ORDER — POLYETHYLENE GLYCOL 3350 17 G/17G
17 POWDER, FOR SOLUTION ORAL DAILY
Refills: 0 | Status: DISCONTINUED | OUTPATIENT
Start: 2025-04-06 | End: 2025-04-15

## 2025-04-06 RX ORDER — AZITHROMYCIN 250 MG
500 CAPSULE ORAL ONCE
Refills: 0 | Status: COMPLETED | OUTPATIENT
Start: 2025-04-06 | End: 2025-04-06

## 2025-04-06 RX ORDER — AMLODIPINE BESYLATE 10 MG/1
1 TABLET ORAL
Refills: 0 | DISCHARGE

## 2025-04-06 RX ORDER — ALBUTEROL SULFATE 2.5 MG/3ML
2 VIAL, NEBULIZER (ML) INHALATION EVERY 6 HOURS
Refills: 0 | Status: DISCONTINUED | OUTPATIENT
Start: 2025-04-06 | End: 2025-04-15

## 2025-04-06 RX ORDER — BISACODYL 5 MG
5 TABLET, DELAYED RELEASE (ENTERIC COATED) ORAL DAILY
Refills: 0 | Status: DISCONTINUED | OUTPATIENT
Start: 2025-04-06 | End: 2025-04-15

## 2025-04-06 RX ORDER — ONDANSETRON HCL/PF 4 MG/2 ML
4 VIAL (ML) INJECTION EVERY 8 HOURS
Refills: 0 | Status: DISCONTINUED | OUTPATIENT
Start: 2025-04-06 | End: 2025-04-15

## 2025-04-06 RX ORDER — HEPARIN SODIUM 1000 [USP'U]/ML
5000 INJECTION INTRAVENOUS; SUBCUTANEOUS EVERY 12 HOURS
Refills: 0 | Status: DISCONTINUED | OUTPATIENT
Start: 2025-04-06 | End: 2025-04-08

## 2025-04-06 RX ORDER — SODIUM CHLORIDE 9 G/1000ML
1000 INJECTION, SOLUTION INTRAVENOUS
Refills: 0 | Status: DISCONTINUED | OUTPATIENT
Start: 2025-04-06 | End: 2025-04-07

## 2025-04-06 RX ORDER — AZITHROMYCIN 250 MG
500 CAPSULE ORAL EVERY 24 HOURS
Refills: 0 | Status: DISCONTINUED | OUTPATIENT
Start: 2025-04-07 | End: 2025-04-09

## 2025-04-06 RX ORDER — CEFTRIAXONE 500 MG/1
1000 INJECTION, POWDER, FOR SOLUTION INTRAMUSCULAR; INTRAVENOUS EVERY 24 HOURS
Refills: 0 | Status: DISCONTINUED | OUTPATIENT
Start: 2025-04-07 | End: 2025-04-09

## 2025-04-06 RX ORDER — CEFTRIAXONE 500 MG/1
1000 INJECTION, POWDER, FOR SOLUTION INTRAMUSCULAR; INTRAVENOUS ONCE
Refills: 0 | Status: COMPLETED | OUTPATIENT
Start: 2025-04-06 | End: 2025-04-06

## 2025-04-06 RX ORDER — NALOXONE HYDROCHLORIDE 0.4 MG/ML
0.4 INJECTION, SOLUTION INTRAMUSCULAR; INTRAVENOUS; SUBCUTANEOUS ONCE
Refills: 0 | Status: DISCONTINUED | OUTPATIENT
Start: 2025-04-06 | End: 2025-04-15

## 2025-04-06 RX ORDER — LISINOPRIL 30 MG/1
25 TABLET ORAL DAILY
Refills: 0 | Status: DISCONTINUED | OUTPATIENT
Start: 2025-04-06 | End: 2025-04-06

## 2025-04-06 RX ADMIN — Medication 1000 MILLILITER(S): at 14:34

## 2025-04-06 RX ADMIN — Medication 1000 MILLILITER(S): at 13:55

## 2025-04-06 RX ADMIN — Medication 2 TABLET(S): at 21:35

## 2025-04-06 RX ADMIN — HEPARIN SODIUM 5000 UNIT(S): 1000 INJECTION INTRAVENOUS; SUBCUTANEOUS at 19:08

## 2025-04-06 RX ADMIN — SODIUM CHLORIDE 85 MILLILITER(S): 9 INJECTION, SOLUTION INTRAVENOUS at 21:35

## 2025-04-06 RX ADMIN — CEFTRIAXONE 1000 MILLIGRAM(S): 500 INJECTION, POWDER, FOR SOLUTION INTRAMUSCULAR; INTRAVENOUS at 15:20

## 2025-04-06 RX ADMIN — CEFTRIAXONE 100 MILLIGRAM(S): 500 INJECTION, POWDER, FOR SOLUTION INTRAMUSCULAR; INTRAVENOUS at 14:54

## 2025-04-06 RX ADMIN — Medication 1 DOSE(S): at 22:04

## 2025-04-06 RX ADMIN — SODIUM CHLORIDE 1000 MILLILITER(S): 9 INJECTION, SOLUTION INTRAVENOUS at 16:37

## 2025-04-06 RX ADMIN — SODIUM CHLORIDE 85 MILLILITER(S): 9 INJECTION, SOLUTION INTRAVENOUS at 17:47

## 2025-04-06 RX ADMIN — Medication 1000 MILLILITER(S): at 15:34

## 2025-04-06 RX ADMIN — Medication 324 MILLIGRAM(S): at 13:51

## 2025-04-06 RX ADMIN — Medication 1000 MILLILITER(S): at 12:55

## 2025-04-06 RX ADMIN — Medication 250 MILLIGRAM(S): at 15:48

## 2025-04-06 NOTE — ED PROVIDER NOTE - OBJECTIVE STATEMENT
Patient is a 89-year-old female with past medical history HTN, hypothyroidism brought in by EMS from home after patient was found on the ground by her HHA.  As per patient niece  at bedside as well as EMS, patient was found on the ground this morning by her home health aide.  Patient last well-known last night.  Patient vital signs stable, oriented to person, not place or time or situation, following commands, moving all extremities equally.  No obvious signs of external trauma.  Patient without any complaints.  Patient is confused as per niece.  No focal neurologic deficits.

## 2025-04-06 NOTE — ED PROVIDER NOTE - PROGRESS NOTE DETAILS
Labs significant for FLAVIO, hypernatremia.  Elevated troponin likely secondary demand ischemia.  EKG within normal limits.  Aspirin given.  Vital signs stable.  Imaging concerning for impacted left femoral neck fracture.  Ortho consulted.  .  Will admit to medicine for further management and evaluation. Aster Bah MD.

## 2025-04-06 NOTE — ED PROVIDER NOTE - CLINICAL SUMMARY MEDICAL DECISION MAKING FREE TEXT BOX
Patient is a 89-year-old female with past medical history HTN, hypothyroidism brought in by EMS from home after patient was found on the ground by her HHA. Patient last well-known last night.  Patient vital signs stable, oriented to person, not place or time or situation, following commands, moving all extremities equally. No focal neurologic deficits.  -  will check labs, rule out electrolyte abnormalities, CPK to eval for rhabdo, cultures rule out bacteremia, lactate eval for end organ dysfunction, EKG troponin to assess for ACS versus arrhythmia, BNP chest x-ray viral swab to eval for heart failure versus pneumonia versus pneumothorax, UA to rule out urinary tract infection, CT head and spine to rule out acute bleed versus fractures, CT C/A/P to rule out rib fractures, reassess, likely admit for encephalopathy.

## 2025-04-06 NOTE — H&P ADULT - HISTORY OF PRESENT ILLNESS
89F, from home, ambulates with cane, PMH HTN, hypothyroidism, COPD, ?dementia. Presenting after fall at home ; was found by HHA on the floor. Unknown down time. At the time of exam, patient AAOx2 (to self, place). Does not appear a great historian. States that she had a fall. States she was attempting to stand up when she fell and landed on her side. Denies head trauma. However, not able to clarify if she had palpitations, dizziness, or other prodromal sx prior to the fall. Endorses dry cough >1 week, however unable to quantify exactly how long. Denies fever, chills, chest pain, abd pain, SOB, dysuria.      89F, from home, ambulates with cane, PMH HTN, hypothyroidism, COPD, ?dementia. Presenting after fall at home ; was found by HHA on the floor. Unknown down time. At the time of exam, patient AAOx2 (to self, place). Does not appear a great historian. States that she had a fall. States she was attempting to stand up when she fell and landed on her side. Denies head trauma. However, not able to clarify if she had palpitations, dizziness, or other prodromal sx prior to the fall. Endorses mildly productive cough >1 week, however unable to quantify exactly how long. Denies fever, chills, chest pain, abd pain, SOB, dysuria.

## 2025-04-06 NOTE — H&P ADULT - PROBLEM SELECTOR PLAN 6
hx of COPD.  on Advair at home. Not on LAMA.     - advair  - albuterol PRN. home meds: levothyroxine 75.    - c/w home meds.   - TSH. home meds: amlodipine 5, atenolol 25, losartan 100.    - holding losartan iso FLAVIO.  - resume amlodipine, atenolol.

## 2025-04-06 NOTE — H&P ADULT - PROBLEM SELECTOR PLAN 5
home meds: levothyroxine 75.    - c/w home meds.   - TSH. home meds: amlodipine 5, atenolol 25, losartan 100.    - holding losartan iso FLAVIO.  - resume amlodipine, atenolol. SCr 1.53  unclear baseline.    - proBNP 3486, however does not appear overloaded.  Looks dry.  - likely FLAVIO iso ACEi/ARB use iso poor PO intake.  - IVF.

## 2025-04-06 NOTE — H&P ADULT - PROBLEM SELECTOR PLAN 3
SCr 1.53  unclear baseline.    - proBNP 3486, however does not appear overloaded.  Looks dry.  - likely FLAVIO iso ACEi/ARB use iso poor PO intake.  - IVF. CT HEAD: Small LEFT frontal encephalocele.     - MRI brain w/wo IV contrast.  - Neurology Dr Mejia consulted.

## 2025-04-06 NOTE — ED PROVIDER NOTE - CARE PLAN
Principal Discharge DX:	Femoral neck fracture  Secondary Diagnosis:	FLAVIO (acute kidney injury)   1

## 2025-04-06 NOTE — ED ADULT NURSE NOTE - OBJECTIVE STATEMENT
BIBEMS accompanied by the daughter, brought in to ED due to unwitnessed fall. Patient denies any discomfort at this time. Patient's daughter that patient has multiple h/o fall.

## 2025-04-06 NOTE — H&P ADULT - ASSESSMENT
89F, from home, ambulates with cane, PMH HTN, hypothyroidism, COPD, ?dementia. Presenting after fall at home ; was found by HHA on the floor. Unknown down time. At the time of exam, patient AAOx2 (to self, place). Does not appear a great historian. CT C/A/P: impaction fracture of LEFT femoral neck. Bronchiectasis and peribronchial nodularity suspicious for ROWDY infection. SCr 1.53. Admitted for mechanical fall, bronchiectasis exacerbation, ROWDY workup, and FLAVIO.       States that she had a fall. States she was attempting to stand up when she fell and landed on her side. Denies head trauma. However, not able to clarify if she had palpitations, dizziness, or other prodromal sx prior to the fall. Endorses dry cough >1 week, however unable to quantify exactly how long. Denies fever, chills, chest pain, abd pain, SOB, dysuria.        89F, from home, ambulates with cane, PMH HTN, hypothyroidism, COPD, ?dementia. Presenting after fall at home ; was found by HHA on the floor. Unknown down time. At the time of exam, patient AAOx2 (to self, place). Does not appear a great historian. CT C/A/P: impaction fracture of LEFT femoral neck. Bronchiectasis and peribronchial nodularity suspicious for ROWDY infection. SCr 1.53. Admitted for mechanical fall, bronchiectasis exacerbation, ROWDY workup, and FLAVIO.        89F, from home, ambulates with cane, PMH HTN, hypothyroidism, COPD, ?dementia. Presenting after fall at home ; was found by HHA on the floor. Unknown down time. At the time of exam, patient AAOx2 (to self, place). Does not appear a great historian. CT HEAD: Small LEFT frontal encephalocele. CT C/A/P: impaction fracture of LEFT femoral neck. Bronchiectasis and peribronchial nodularity suspicious for ROWDY infection. SCr 1.53. Admitted for mechanical fall, bronchiectasis exacerbation, ROWDY workup, and FLAVIO.        89F, from home, ambulates with cane, PMH HTN, hypothyroidism, COPD, ?dementia. Presenting after fall at home ; was found by HHA on the floor. Unknown down time. At the time of exam, patient AAOx2 (to self, place). Does not appear a great historian. CT HEAD: Small LEFT frontal encephalocele. CT C/A/P: impaction fracture of LEFT femoral neck. Bronchiectasis and peribronchial nodularity suspicious for ROWDY infection. SCr 1.53. Trop 109, proBNP 3k. Admitted for mechanical fall, bronchiectasis exacerbation, ROWDY workup, and FLAVIO.

## 2025-04-06 NOTE — H&P ADULT - PROBLEM SELECTOR PLAN 7
- heparin SQ. hx of COPD.  on Advair at home. Not on LAMA.     - advair  - albuterol PRN. home meds: levothyroxine 75.    - c/w home meds.   - TSH.

## 2025-04-06 NOTE — H&P ADULT - PROBLEM SELECTOR PLAN 1
Presenting after fall at home ; was found by HHA on the floor. Unknown down time.   EKG - sinus jose e @53 bpm.  trops neg.   CT showing impaction fracture of LEFT femoral neck.    - lower suspicion for syncope. Likely mechanical fall.  - Ortho consulted.   - pain control.  - fall precautions. Presenting after fall at home ; was found by HHA on the floor. Unknown down time.   EKG - sinus jose e @53 bpm.  trops neg.   CT showing impaction fracture of LEFT femoral neck.    - lower suspicion for syncope. Likely mechanical fall.  - XR pelvis.   - Ortho consulted.   - pain control.  - fall precautions.

## 2025-04-06 NOTE — ED ADULT NURSE NOTE - NSFALLHARMRISKINTERV_ED_ALL_ED

## 2025-04-06 NOTE — ED PROVIDER NOTE - PHYSICAL EXAMINATION
----- Message from Yoselin Wolfe sent at 5/10/2023  9:45 AM CDT -----  Contact: dad Mp   Dad would like to get a note for school. He said Agus has not been to school since his surgery. He wants to see if it can be sent to the portal     
Spoke w/ pt father and advised child need to attend school. Sent letter via [employer]   
Gen: no acute distress  Head: normocephalic, atraumatic  Lung: CTAB, no respiratory distress, no wheezing, rales, rhonchi  CV: normal s1/s2, rrr,   Abd: soft, non-tender, non-distended  MSK: full range of motion in all 4 extremities  Neuro: aaox1,   Following commands, moving all extremities equally, PERRLA, EOMI

## 2025-04-06 NOTE — H&P ADULT - ATTENDING COMMENTS
89F, from home, ambulates with cane, PMH HTN, hypothyroidism, COPD, ?dementia. Presenting after fall at home ; was found by HHA on the floor. Unknown down time. At the time of exam, patient AAOx2 (to self, place). Does not appear a great historian. States that she had a fall. States she was attempting to stand up when she fell and landed on her side. Denies head trauma. However, not able to clarify if she had palpitations, dizziness, or other prodromal sx prior to the fall. Endorses mildly productive cough >1 week, however unable to quantify exactly how long. Denies fever, chills, chest pain, abd pain, SOB, dysuria.     < from: CT Cervical Spine No Cont (04.06.25 @ 13:37) >    IMPRESSION:    CT HEAD :    No traumatic intracranial abnormality.    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   Recommend MRI of the brain seizure protocol without and with contrast for   further evaluation.    Bilateral sphenoid sinus disease, as above.    CT CERVICAL SPINE:    No acute fracture or traumatic malalignment.    Multilevel degenerative changes.    < end of copied text >      < from: CT Abdomen and Pelvis No Cont (04.06.25 @ 13:36) >    IMPRESSION:  Impaction fracture of left femoral neck.    No CT evidence of visceral injury in the chest, abdomen and pelvis.    Bronchiectasis and peribronchial nodularity suspicious for ROWDY infection.      < end of copied text >          assessment  --  left femoral neck fx, bronchiectasis with rowdy, licha,  left frontal encephalocele, h/o HTN, hypothyroidism, COPD, ?dementia.     plan  --  admit to med, pain control, rocephin and zithromax,  hold losartan 2nd to licha, cont preadmit home meds, gi and dvt prophylaxis, ivf   cbc, bmp, mg, phos, lipids, tsh, sputum afb,    mr brain w/wo contrast  xray hip pelvis       neuro cons  ortho f/u  pulm cons   id cons

## 2025-04-06 NOTE — CONSULT NOTE ADULT - SUBJECTIVE AND OBJECTIVE BOX
Orthopedic Consult  FIFI SR [id number]  89yFemale      Diagnosis:  89yFemale with Left Hip Femoral Neck Fracture  HPI:  Patient is a 89-year-old female, ambulates at baseline with a walker, with past medical history HTN, hypothyroidism brought in by EMS from home after patient was found on the ground by her HHA this morning. Niece states that patient had a fall recently in  where she was seen at \[facility]\"" and was told she had a left hip fracture.             PAST MEDICAL & SURGICAL HISTORY:  HTN (hypertension)      S/P  section          Social History:      Allergies    No Known Allergies    Intolerances        Vital Signs Last 24 Hrs  T(C): 36.5 (2025 15:22), Max: 36.5 (2025 15:22)  T(F): 97.7 (2025 15:22), Max: 97.7 (2025 15:22)  HR: 60 (2025 15:22) (57 - 60)  BP: 110/62 (2025 15:22) (107/59 - 110/62)  BP(mean): --  RR: 18 (2025 15:22) (18 - 18)  SpO2: 99% (2025 15:22) (99% - 99%)    Parameters below as of 2025 15:22  Patient On (Oxygen Delivery Method): room air      I&O's Detail      Physical Exam:  General: NAD, resting in bed  Left Hip:  Skin is pink and warm.minimal TTP noted over left hip. Decreased ROM of the affected hip due to pain. SILT. NVI. Positive logroll test. Positive heel strike.  Lower extremity:  No calf tenderness, calves are soft. 2+pulses. NVI. 5/5 Strength of FHL/EHL/ADF/APF b/l.  Good capillary refill. Warm, well-perfused.                           13.5   9.27  )-----------( 241      ( 2025 11:45 )             43.5     04-06    149[H]  |  112[H]  |  55[H]  ----------------------------<  206[H]  3.7   |  31  |  1.53[H]    Ca    11.2[H]      2025 11:45    TPro  7.1  /  Alb  2.5[L]  /  TBili  0.5  /  DBili  x   /  AST  39  /  ALT  40  /  AlkPhos  119  -    PT/INR - ( 2025 11:45 )   PT: 10.9 sec;   INR: 0.93 ratio         PTT - ( 2025 11:45 )  PTT:31.6 sec  Urinalysis Basic - ( 2025 11:55 )    Color: Yellow / Appearance: Clear / S.018 / pH: x  Gluc: x / Ketone: Negative mg/dL  / Bili: Negative / Urobili: 0.2 mg/dL   Blood: x / Protein: 30 mg/dL / Nitrite: Negative   Leuk Esterase: Negative / RBC: 2 /HPF / WBC 2 /HPF   Sq Epi: x / Non Sq Epi: x / Bacteria: Moderate /HPF        Imaging:  < from: CT Abdomen and Pelvis No Cont (25 @ 13:36) >    FINDINGS:  CHEST:  LUNGS AND LARGE AIRWAYS: Patent central airways. Multifocal cystic   bronchiectasis, worst in the lingula. Associated mucous plugging and   peribronchial opacities/nodularities.  PLEURA: No pleural effusion.  VESSELS: Atheromatous calcifications of the thoracic aorta.  HEART: Heart size is normal. No pericardial effusion.  MEDIASTINUM AND DAGO: No lymphadenopathy.  CHEST WALL AND LOWER NECK: Within normal limits.    ABDOMEN AND PELVIS:  LIVER: Within normal limits.  BILE DUCTS: Mild intra and extrahepatic biliary duct dilatation.  GALLBLADDER: Within normal limits.  SPLEEN: Within normal limits.  PANCREAS: Pancreatic parenchymal atrophy and mild duct dilatation.  ADRENALS: Within normal limits.  KIDNEYS/URETERS: Within normal limits.    BLADDER: Within normal limits.  REPRODUCTIVE ORGANS:Uterus and adnexa within normal limits.    BOWEL: No bowel obstruction. Appendix is normal.  PERITONEUM/RETROPERITONEUM: Within normal limits.  VESSELS: Within normal limits.  LYMPH NODES: No lymphadenopathy.  ABDOMINAL WALL: Within normal limits.  BONES: Osteopenia. Impaction fracture of left femoral neck.    IMPRESSION:  Impaction fracture of left femoral neck.    No CT evidence of visceral injury in the chest, abdomen and pelvis.    Bronchiectasis and peribronchial nodularity suspicious for ROWDY infection.      Impression:  89yFemale with Left Hip Femoral Neck Fracture    Plan:  -  Pain management  -  DVT prophylaxis with heparin and Venodynes  -  Fracture care with bedrest now, NWB of the affected extremity  -  Surgeon:  Dr. Moreno  -  Medical Optimization/clearance  -  Chlorhexadine wipe and Povidone Iodine nasal swabs ordered   -  Consent: Pending  - Xray pelvis/hip ordered   -  Case d/w Dr. Moreno    Orthopedic Consult  FIFI SR [id number]  89yFemale      Diagnosis:  89yFemale with Left Hip Femoral Neck Fracture  HPI:  Patient is a 89-year-old female, ambulates at baseline with a cane/walker, with past medical history HTN, hypothyroidism, COPD and dementia brought in by EMS from home after patient was found on the ground by her HHA this morning. Niece states that patient had a fall recently in  where she was seen at [facility] and was told she had a left hip fracture, reports she was discharged with no surgical intervention and had follow up xrays in february. Patient is a poor historian, history taken from niece at bedside who reports that patient fell on her side and was able to ambulate after the fall. Patient states the pain is located over the left buttock/hip, without any radiation. Pt denies Chest pain, SOB, dyspnea, paresthesias, N/V/D, abdominal pain, syncope, or pain anywhere else.           PAST MEDICAL & SURGICAL HISTORY:  HTN (hypertension)      S/P  section          Social History:      Allergies    No Known Allergies    Intolerances        Vital Signs Last 24 Hrs  T(C): 36.5 (2025 15:22), Max: 36.5 (2025 15:22)  T(F): 97.7 (2025 15:22), Max: 97.7 (2025 15:22)  HR: 60 (2025 15:22) (57 - 60)  BP: 110/62 (2025 15:22) (107/59 - 110/62)  BP(mean): --  RR: 18 (2025 15:22) (18 - 18)  SpO2: 99% (2025 15:22) (99% - 99%)    Parameters below as of 2025 15:22  Patient On (Oxygen Delivery Method): room air      I&O's Detail      Physical Exam:  General: NAD, resting in bed  Left Hip:  Skin is pink and warm.minimal TTP noted over left hip. Pain with flexion of the hip at 70 degrees. Negative log roll. Negative heel strike  Lower extremity:  No calf tenderness, calves are soft. 2+pulses. NVI. 5/5 Strength of FHL/EHL/ADF/APF b/l.  Good capillary refill. Warm, well-perfused.                           13.5   9.27  )-----------( 241      ( 2025 11:45 )             43.5     04-    149[H]  |  112[H]  |  55[H]  ----------------------------<  206[H]  3.7   |  31  |  1.53[H]    Ca    11.2[H]      2025 11:45    TPro  7.1  /  Alb  2.5[L]  /  TBili  0.5  /  DBili  x   /  AST  39  /  ALT  40  /  AlkPhos  119  04-    PT/INR - ( 2025 11:45 )   PT: 10.9 sec;   INR: 0.93 ratio         PTT - ( 2025 11:45 )  PTT:31.6 sec  Urinalysis Basic - ( 2025 11:55 )    Color: Yellow / Appearance: Clear / S.018 / pH: x  Gluc: x / Ketone: Negative mg/dL  / Bili: Negative / Urobili: 0.2 mg/dL   Blood: x / Protein: 30 mg/dL / Nitrite: Negative   Leuk Esterase: Negative / RBC: 2 /HPF / WBC 2 /HPF   Sq Epi: x / Non Sq Epi: x / Bacteria: Moderate /HPF        Imaging:  < from: CT Abdomen and Pelvis No Cont (25 @ 13:36) >    FINDINGS:  CHEST:  LUNGS AND LARGE AIRWAYS: Patent central airways. Multifocal cystic   bronchiectasis, worst in the lingula. Associated mucous plugging and   peribronchial opacities/nodularities.  PLEURA: No pleural effusion.  VESSELS: Atheromatous calcifications of the thoracic aorta.  HEART: Heart size is normal. No pericardial effusion.  MEDIASTINUM AND DAGO: No lymphadenopathy.  CHEST WALL AND LOWER NECK: Within normal limits.    ABDOMEN AND PELVIS:  LIVER: Within normal limits.  BILE DUCTS: Mild intra and extrahepatic biliary duct dilatation.  GALLBLADDER: Within normal limits.  SPLEEN: Within normal limits.  PANCREAS: Pancreatic parenchymal atrophy and mild duct dilatation.  ADRENALS: Within normal limits.  KIDNEYS/URETERS: Within normal limits.    BLADDER: Within normal limits.  REPRODUCTIVE ORGANS:Uterus and adnexa within normal limits.    BOWEL: No bowel obstruction. Appendix is normal.  PERITONEUM/RETROPERITONEUM: Within normal limits.  VESSELS: Within normal limits.  LYMPH NODES: No lymphadenopathy.  ABDOMINAL WALL: Within normal limits.  BONES: Osteopenia. Impaction fracture of left femoral neck.    IMPRESSION:  Impaction fracture of left femoral neck.    No CT evidence of visceral injury in the chest, abdomen and pelvis.    Bronchiectasis and peribronchial nodularity suspicious for ROWDY infection.      Impression:  89yFemale with Left Hip Femoral Neck Fracture    Plan:  -  Pain management  -  DVT prophylaxis with heparin and Venodynes  -  Fracture care with bedrest now, NWB of the affected extremity  -  Surgeon:  Dr. Moreno  -  Medical Optimization/clearance  -  Chlorhexadine wipe and Povidone Iodine nasal swabs ordered   -  Consent: Pending  - Xray pelvis/hip ordered   -  Recommend MRI of left hip without contrast to evaluate chronicity of fracture, given recent falls and reported evaluation for similar issue.  -  Case d/w Dr. Moreno

## 2025-04-06 NOTE — H&P ADULT - NSHPPHYSICALEXAM_GEN_ALL_CORE
Vital Signs Last 24 Hrs  T(C): 36.7 (06 Apr 2025 17:24), Max: 36.7 (06 Apr 2025 17:24)  T(F): 98.1 (06 Apr 2025 17:24), Max: 98.1 (06 Apr 2025 17:24)  HR: 82 (06 Apr 2025 17:24) (57 - 82)  BP: 112/65 (06 Apr 2025 17:24) (107/59 - 112/65)  BP(mean): --  RR: 17 (06 Apr 2025 17:24) (17 - 18)  SpO2: 96% (06 Apr 2025 17:24) (96% - 99%)    Parameters below as of 06 Apr 2025 17:24  Patient On (Oxygen Delivery Method): room air    GENERAL: +thin, cachectic. NAD  HEAD:  Atraumatic, Normocephalic  EYES: EOMI, PERRLA, conjunctiva and sclera clear  ENMT: +dry mucus membranes. No tonsillar erythema, exudates, or enlargement  NECK: Supple, normal appearance, No JVD; Normal thyroid; Trachea midline  NERVOUS SYSTEM:  Alert & Oriented X2,  Motor Strength 4/5 B/L upper and lower extremities, sensation intact  CHEST/LUNG: +coarse BS b/l, no rhonchi or wheezing.   HEART: Regular rate and rhythm; No murmurs, rubs, or gallops  ABDOMEN: Soft, Nontender, Nondistended; Bowel sounds present  EXTREMITIES:  2+ Peripheral Pulses, No clubbing, cyanosis, or edema  LYMPH: No lymphadenopathy noted  SKIN: No rashes or lesions

## 2025-04-06 NOTE — H&P ADULT - PROBLEM SELECTOR PLAN 4
home meds: amlodipine 5, atenolol 25, losartan 100.    - holding losartan iso FLAVIO.  - resume amlodipine, atenolol. SCr 1.53  unclear baseline.    - proBNP 3486, however does not appear overloaded.  Looks dry.  - likely FLAVIO iso ACEi/ARB use iso poor PO intake.  - IVF. trop 109  EKG sinus jose e. No ischemic changes.  denies chest pain.    - likely demand ischemia.  - trend trop to peak.

## 2025-04-06 NOTE — H&P ADULT - NSICDXPASTMEDICALHX_GEN_ALL_CORE_FT
PAST MEDICAL HISTORY:  Chronic obstructive pulmonary disease (COPD)     HTN (hypertension)     Hypothyroidism

## 2025-04-06 NOTE — H&P ADULT - PROBLEM SELECTOR PLAN 2
CT chest - showing Bronchiectasis and peribronchial nodularity suspicious for ROWDY infection.  endorses mildly productive cough.  hx of COPD.    - Will need to r/o ROWDY.   - acid fast sputum w/culture x2.  - taisha PONCE.   - ID consult. CT chest - showing Bronchiectasis and peribronchial nodularity suspicious for ROWDY infection.  endorses mildly productive cough.  hx of COPD.    - Will need to r/o ROWDY.   - acid fast sputum w/culture x2.  - taisha PONCE.   - ID Dr Romo consulted.

## 2025-04-07 DIAGNOSIS — R00.1 BRADYCARDIA, UNSPECIFIED: ICD-10-CM

## 2025-04-07 DIAGNOSIS — E87.8 OTHER DISORDERS OF ELECTROLYTE AND FLUID BALANCE, NOT ELSEWHERE CLASSIFIED: ICD-10-CM

## 2025-04-07 LAB
24R-OH-CALCIDIOL SERPL-MCNC: 59.6 NG/ML — SIGNIFICANT CHANGE UP
ALBUMIN SERPL ELPH-MCNC: 2.2 G/DL — LOW (ref 3.5–5)
ALP SERPL-CCNC: 111 U/L — SIGNIFICANT CHANGE UP (ref 40–120)
ALT FLD-CCNC: 35 U/L DA — SIGNIFICANT CHANGE UP (ref 10–60)
ANION GAP SERPL CALC-SCNC: 7 MMOL/L — SIGNIFICANT CHANGE UP (ref 5–17)
AST SERPL-CCNC: 32 U/L — SIGNIFICANT CHANGE UP (ref 10–40)
BASOPHILS # BLD AUTO: 0.01 K/UL — SIGNIFICANT CHANGE UP (ref 0–0.2)
BASOPHILS NFR BLD AUTO: 0.1 % — SIGNIFICANT CHANGE UP (ref 0–2)
BILIRUB SERPL-MCNC: 0.4 MG/DL — SIGNIFICANT CHANGE UP (ref 0.2–1.2)
BUN SERPL-MCNC: 36 MG/DL — HIGH (ref 7–18)
CALCIUM SERPL-MCNC: 9.7 MG/DL — SIGNIFICANT CHANGE UP (ref 8.4–10.5)
CHLORIDE SERPL-SCNC: 109 MMOL/L — HIGH (ref 96–108)
CK MB BLD-MCNC: 5.8 % — HIGH (ref 0–3.5)
CK MB CFR SERPL CALC: 2.9 NG/ML — SIGNIFICANT CHANGE UP (ref 0–3.6)
CK SERPL-CCNC: 50 U/L — SIGNIFICANT CHANGE UP (ref 21–215)
CO2 SERPL-SCNC: 29 MMOL/L — SIGNIFICANT CHANGE UP (ref 22–31)
CREAT SERPL-MCNC: 0.97 MG/DL — SIGNIFICANT CHANGE UP (ref 0.5–1.3)
EGFR: 56 ML/MIN/1.73M2 — LOW
EGFR: 56 ML/MIN/1.73M2 — LOW
EOSINOPHIL # BLD AUTO: 0.03 K/UL — SIGNIFICANT CHANGE UP (ref 0–0.5)
EOSINOPHIL NFR BLD AUTO: 0.3 % — SIGNIFICANT CHANGE UP (ref 0–6)
GLUCOSE SERPL-MCNC: 99 MG/DL — SIGNIFICANT CHANGE UP (ref 70–99)
HCT VFR BLD CALC: 43 % — SIGNIFICANT CHANGE UP (ref 34.5–45)
HGB BLD-MCNC: 13.2 G/DL — SIGNIFICANT CHANGE UP (ref 11.5–15.5)
IMM GRANULOCYTES NFR BLD AUTO: 0.4 % — SIGNIFICANT CHANGE UP (ref 0–0.9)
LACTATE SERPL-SCNC: 2 MMOL/L — SIGNIFICANT CHANGE UP (ref 0.7–2)
LYMPHOCYTES # BLD AUTO: 0.87 K/UL — LOW (ref 1–3.3)
LYMPHOCYTES # BLD AUTO: 9.4 % — LOW (ref 13–44)
MAGNESIUM SERPL-MCNC: 1.9 MG/DL — SIGNIFICANT CHANGE UP (ref 1.6–2.6)
MCHC RBC-ENTMCNC: 26.8 PG — LOW (ref 27–34)
MCHC RBC-ENTMCNC: 30.7 G/DL — LOW (ref 32–36)
MCV RBC AUTO: 87.2 FL — SIGNIFICANT CHANGE UP (ref 80–100)
MONOCYTES # BLD AUTO: 0.3 K/UL — SIGNIFICANT CHANGE UP (ref 0–0.9)
MONOCYTES NFR BLD AUTO: 3.3 % — SIGNIFICANT CHANGE UP (ref 2–14)
NEUTROPHILS # BLD AUTO: 7.96 K/UL — HIGH (ref 1.8–7.4)
NEUTROPHILS NFR BLD AUTO: 86.5 % — HIGH (ref 43–77)
NRBC BLD AUTO-RTO: 0 /100 WBCS — SIGNIFICANT CHANGE UP (ref 0–0)
PHOSPHATE SERPL-MCNC: 1.6 MG/DL — LOW (ref 2.5–4.5)
PLATELET # BLD AUTO: 234 K/UL — SIGNIFICANT CHANGE UP (ref 150–400)
POTASSIUM SERPL-MCNC: 3.2 MMOL/L — LOW (ref 3.5–5.3)
POTASSIUM SERPL-SCNC: 3.2 MMOL/L — LOW (ref 3.5–5.3)
PROT SERPL-MCNC: 6.4 G/DL — SIGNIFICANT CHANGE UP (ref 6–8.3)
RBC # BLD: 4.93 M/UL — SIGNIFICANT CHANGE UP (ref 3.8–5.2)
RBC # FLD: 14.2 % — SIGNIFICANT CHANGE UP (ref 10.3–14.5)
SODIUM SERPL-SCNC: 145 MMOL/L — SIGNIFICANT CHANGE UP (ref 135–145)
T3FREE SERPL-MCNC: 1.1 PG/ML — LOW (ref 2–4.4)
T4 FREE SERPL-MCNC: 1.1 NG/DL — SIGNIFICANT CHANGE UP (ref 0.9–1.7)
TROPONIN I, HIGH SENSITIVITY RESULT: 90 NG/L — HIGH
TROPONIN I, HIGH SENSITIVITY RESULT: 97.8 NG/L — HIGH
TSH SERPL-MCNC: 20.5 UU/ML — HIGH (ref 0.34–4.82)
WBC # BLD: 9.21 K/UL — SIGNIFICANT CHANGE UP (ref 3.8–10.5)
WBC # FLD AUTO: 9.21 K/UL — SIGNIFICANT CHANGE UP (ref 3.8–10.5)

## 2025-04-07 PROCEDURE — 99223 1ST HOSP IP/OBS HIGH 75: CPT | Mod: FS

## 2025-04-07 RX ORDER — LORAZEPAM 4 MG/ML
0.5 VIAL (ML) INJECTION ONCE
Refills: 0 | Status: DISCONTINUED | OUTPATIENT
Start: 2025-04-07 | End: 2025-04-10

## 2025-04-07 RX ORDER — SOD PHOS DI, MONO/K PHOS MONO 250 MG
1 TABLET ORAL EVERY 8 HOURS
Refills: 0 | Status: COMPLETED | OUTPATIENT
Start: 2025-04-07 | End: 2025-04-08

## 2025-04-07 RX ORDER — SALINE 7; 19 G/118ML; G/118ML
1 ENEMA RECTAL ONCE
Refills: 0 | Status: DISCONTINUED | OUTPATIENT
Start: 2025-04-07 | End: 2025-04-07

## 2025-04-07 RX ADMIN — SODIUM CHLORIDE 85 MILLILITER(S): 9 INJECTION, SOLUTION INTRAVENOUS at 05:45

## 2025-04-07 RX ADMIN — CEFTRIAXONE 100 MILLIGRAM(S): 500 INJECTION, POWDER, FOR SOLUTION INTRAMUSCULAR; INTRAVENOUS at 14:34

## 2025-04-07 RX ADMIN — Medication 4 MILLILITER(S): at 20:27

## 2025-04-07 RX ADMIN — HEPARIN SODIUM 5000 UNIT(S): 1000 INJECTION INTRAVENOUS; SUBCUTANEOUS at 05:46

## 2025-04-07 RX ADMIN — HEPARIN SODIUM 5000 UNIT(S): 1000 INJECTION INTRAVENOUS; SUBCUTANEOUS at 17:19

## 2025-04-07 RX ADMIN — Medication 75 MICROGRAM(S): at 05:46

## 2025-04-07 RX ADMIN — Medication 250 MILLIGRAM(S): at 15:21

## 2025-04-07 RX ADMIN — Medication 1 DOSE(S): at 09:04

## 2025-04-07 RX ADMIN — Medication 1 PACKET(S): at 17:17

## 2025-04-07 RX ADMIN — Medication 40 MILLIEQUIVALENT(S): at 08:58

## 2025-04-07 RX ADMIN — AMLODIPINE BESYLATE 5 MILLIGRAM(S): 10 TABLET ORAL at 05:46

## 2025-04-07 RX ADMIN — POLYETHYLENE GLYCOL 3350 17 GRAM(S): 17 POWDER, FOR SOLUTION ORAL at 11:24

## 2025-04-07 RX ADMIN — Medication 1 PACKET(S): at 08:59

## 2025-04-07 NOTE — PROGRESS NOTE ADULT - ASSESSMENT
89 y/o F, Nepalese speaking from home, ambulates with cane, PMH HTN, hypothyroidism, COPD, dementia. Presenting after fall at home ; was found by Niece on the floor.    CT HEAD: Small LEFT frontal encephalocele.   CT C/A/P: impaction fracture of LEFT femoral neck. Bronchiectasis and peribronchial nodularity suspicious for ROWDY infection.   SCr 1.53. Trop 109, proBNP 3k.   Admitted for mechanical fall, bronchiectasis exacerbation, ROWDY workup, and MONO..     Troponin downtrended to 90, Cardiology Dr. Richmond consulted. awaiting echo.   Mono improved, SCr now 0.97.   Ortho following rec MRI left hip to r/o acute fx.   Neuro Dr. 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   ID Dr. Romo consulted for concern of ROWDY infection, awaiting sputum cultures, blood cultures.

## 2025-04-07 NOTE — DIETITIAN INITIAL EVALUATION ADULT - ADD RECOMMEND
1) Continue current diet as medically feasible.  2) Encourage PO intake and honor food preferences as able.  3) Monitor PO intake, labs, weights, BM's, and skin integrity.   4) Recommend ensure enlive TID

## 2025-04-07 NOTE — DIETITIAN INITIAL EVALUATION ADULT - PROBLEM SELECTOR PLAN 4
trop 109  EKG sinus jose e. No ischemic changes.  denies chest pain.    - likely demand ischemia.  - trend trop to peak.

## 2025-04-07 NOTE — CONSULT NOTE ADULT - SUBJECTIVE AND OBJECTIVE BOX
Patient is a 89y old  Female who is from home, ambulates with cane, and PMH of HTN, hypothyroidism, COPD, ?dementia. now Presents to the ER for evaluation of A fall at home, was found by HHA on the floor. Unknown down time. In ER, patient AAOx2 (to self, place). States that she had a fall while attempting to stand up, fell and landed on her side. Denies head trauma. On admission, she found to have no fever but Subcapital fracture of the left hip and Bilateral sphenoid sinus disease.  The CT chest shows Bronchiectasis, hence the ID consult requested to assist with further evaluation and rule out ROWDY.      REVIEW OF SYSTEMS: Total of twelve systems have been reviewed with patient and found to be negative unless mentioned in HPI      PAST MEDICAL & SURGICAL HISTORY:  HTN (hypertension)  Hypothyroidism  Chronic obstructive pulmonary disease (COPD)  S/P  section      SOCIAL HISTORY  Alcohol: Does not drink  Tobacco: Does not smoke  Illicit substance use: None      FAMILY HISTORY: Non contributory to the present illness      ALLERGIES: No Known Allergies      Vital Signs Last 24 Hrs  T(C): 36.4 (2025 13:20), Max: 36.7 (2025 17:24)  T(F): 97.5 (2025 13:20), Max: 98.1 (2025 17:24)  HR: 72 (2025 13:20) (47 - 82)  BP: 132/67 (2025 13:20) (101/57 - 134/70)  BP(mean): --  RR: 18 (2025 13:20) (16 - 18)  SpO2: 95% (2025 13:20) (95% - 99%)    Parameters below as of 2025 13:20  Patient On (Oxygen Delivery Method): room air      PHYSICAL EXAM:  GENERAL: Not in distress   CHEST/LUNG:  Aire ntry bilaterally  HEART: s1 and s2 present  ABDOMEN:  Nontender and  Nondistended  EXTREMITIES: No pedal  edema  CNS: Awake and Alert      LABS:                        13.2   9.21  )-----------( 234      ( 2025 05:25 )             43.0       04-07    145  |  109[H]  |  36[H]  ----------------------------<  99  3.2[L]   |  29  |  0.97    Ca    9.7      2025 05:25  Phos  1.6     04-07  Mg     1.9     -    TPro  6.4  /  Alb  2.2[L]  /  TBili  0.4  /  DBili  x   /  AST  32  /  ALT  35  /  AlkPhos  111  -07    PT/INR - ( 2025 11:45 )   PT: 10.9 sec;   INR: 0.93 ratio         PTT - ( 2025 11:45 )  PTT:31.6 sec      MEDICATIONS  (STANDING):  amLODIPine   Tablet 5 milliGRAM(s) Oral daily  azithromycin  IVPB 500 milliGRAM(s) IV Intermittent every 24 hours  cefTRIAXone   IVPB 1000 milliGRAM(s) IV Intermittent every 24 hours  fluticasone propionate/ salmeterol 100-50 MICROgram(s) Diskus 1 Dose(s) Inhalation two times a day  heparin   Injectable 5000 Unit(s) SubCutaneous every 12 hours  lactated ringers. 1000 milliLiter(s) (85 mL/Hr) IV Continuous <Continuous>  levothyroxine 75 MICROGram(s) Oral daily  LORazepam   Injectable 0.5 milliGRAM(s) IV Push once  naloxone Injectable 0.4 milliGRAM(s) IV Push once  polyethylene glycol 3350 17 Gram(s) Oral daily  potassium phosphate / sodium phosphate Powder (PHOS-NaK) 1 Packet(s) Oral every 8 hours  senna 2 Tablet(s) Oral at bedtime    MEDICATIONS  (PRN):  acetaminophen     Tablet .. 650 milliGRAM(s) Oral every 6 hours PRN Temp greater or equal to 38C (100.4F), Mild Pain (1 - 3)  albuterol    90 MICROgram(s) HFA Inhaler 2 Puff(s) Inhalation every 6 hours PRN Shortness of Breath and/or Wheezing  bisacodyl 5 milliGRAM(s) Oral daily PRN Constipation  ondansetron Injectable 4 milliGRAM(s) IV Push every 8 hours PRN Nausea and/or Vomiting  oxyCODONE    IR 2.5 milliGRAM(s) Oral every 4 hours PRN Moderate Pain (4 - 6)  oxyCODONE    IR 5 milliGRAM(s) Oral every 4 hours PRN Severe Pain (7 - 10)      RADIOLOGY & ADDITIONAL TESTS:    25: Xray Femur 2 Views, Left (25 @ 15:42) IMPRESSION: Subcapital fracture of the left hip. 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      25: CT Cervical Spine No Cont (25 @ 13:37) >  IMPRESSION:    CT HEAD : No traumatic intracranial abnormality.    Questionable small left frontal encephalocele extending into the left ethmoid sinus and slightly protruding into the left superomedial orbit.   Recommend MRI of the brain seizure protocol without and with contrast for further evaluation.    Bilateral sphenoid sinus disease.    CT CERVICAL SPINE:    No acute fracture or traumatic malalignment.  Multilevel degenerative changes.        MICROBIOLOGY DATA:    Urine Microscopic-Add On (NC) (25 @ 11:55)   White Blood Cell - Urine: 2 /HPF  Red Blood Cell - Urine: 2 /HPF  Bacteria: Moderate /HPF  Squamous Epithelial Cells: Present             Patient is a 89y old  Female who is from home, ambulates with cane, and PMH of HTN, hypothyroidism, COPD, ?dementia. now Presents to the ER for evaluation of A fall at home, was found by HHA on the floor. Unknown down time. In ER, patient AAOx2 (to self, place). States that she had a fall while attempting to stand up, fell and landed on her side. Denies head trauma. On admission, she found to have no fever but Subcapital fracture of the left hip and Bilateral sphenoid sinus disease.  The CT chest shows Bronchiectasis, hence the ID consult requested to assist with further evaluation and rule out ROWDY.      REVIEW OF SYSTEMS: Unable to obtain due to mental status unless mentioned in HPI      PAST MEDICAL & SURGICAL HISTORY:  HTN (hypertension)  Hypothyroidism  Chronic obstructive pulmonary disease (COPD)  S/P  section      SOCIAL HISTORY  Alcohol: Does not drink  Tobacco: Does not smoke  Illicit substance use: None      FAMILY HISTORY: Non contributory to the present illness      ALLERGIES: No Known Allergies      Vital Signs Last 24 Hrs  T(C): 36.4 (2025 13:20), Max: 36.7 (2025 17:24)  T(F): 97.5 (2025 13:20), Max: 98.1 (2025 17:24)  HR: 72 (2025 13:20) (47 - 82)  BP: 132/67 (2025 13:20) (101/57 - 134/70)  BP(mean): --  RR: 18 (2025 13:20) (16 - 18)  SpO2: 95% (2025 13:20) (95% - 99%)    Parameters below as of 2025 13:20  Patient On (Oxygen Delivery Method): room air      PHYSICAL EXAM:  GENERAL: Not in distress   CHEST/LUNG:  Aire ntry bilaterally  HEART: s1 and s2 present  ABDOMEN:  Nontender and  Nondistended  EXTREMITIES: No pedal  edema  CNS: Awake and Alert      LABS:                        13.2   9.21  )-----------( 234      ( 2025 05:25 )             43.0       04-07    145  |  109[H]  |  36[H]  ----------------------------<  99  3.2[L]   |  29  |  0.97    Ca    9.7      2025 05:25  Phos  1.6     04-07  Mg     1.9     04-07    TPro  6.4  /  Alb  2.2[L]  /  TBili  0.4  /  DBili  x   /  AST  32  /  ALT  35  /  AlkPhos  111  04-07    PT/INR - ( 2025 11:45 )   PT: 10.9 sec;   INR: 0.93 ratio         PTT - ( 2025 11:45 )  PTT:31.6 sec      MEDICATIONS  (STANDING):  amLODIPine   Tablet 5 milliGRAM(s) Oral daily  azithromycin  IVPB 500 milliGRAM(s) IV Intermittent every 24 hours  cefTRIAXone   IVPB 1000 milliGRAM(s) IV Intermittent every 24 hours  fluticasone propionate/ salmeterol 100-50 MICROgram(s) Diskus 1 Dose(s) Inhalation two times a day  heparin   Injectable 5000 Unit(s) SubCutaneous every 12 hours  lactated ringers. 1000 milliLiter(s) (85 mL/Hr) IV Continuous <Continuous>  levothyroxine 75 MICROGram(s) Oral daily  LORazepam   Injectable 0.5 milliGRAM(s) IV Push once  naloxone Injectable 0.4 milliGRAM(s) IV Push once  polyethylene glycol 3350 17 Gram(s) Oral daily  potassium phosphate / sodium phosphate Powder (PHOS-NaK) 1 Packet(s) Oral every 8 hours  senna 2 Tablet(s) Oral at bedtime    MEDICATIONS  (PRN):  acetaminophen     Tablet .. 650 milliGRAM(s) Oral every 6 hours PRN Temp greater or equal to 38C (100.4F), Mild Pain (1 - 3)  albuterol    90 MICROgram(s) HFA Inhaler 2 Puff(s) Inhalation every 6 hours PRN Shortness of Breath and/or Wheezing  bisacodyl 5 milliGRAM(s) Oral daily PRN Constipation  ondansetron Injectable 4 milliGRAM(s) IV Push every 8 hours PRN Nausea and/or Vomiting  oxyCODONE    IR 2.5 milliGRAM(s) Oral every 4 hours PRN Moderate Pain (4 - 6)  oxyCODONE    IR 5 milliGRAM(s) Oral every 4 hours PRN Severe Pain (7 - 10)      RADIOLOGY & ADDITIONAL TESTS:    25: Xray Femur 2 Views, Left (25 @ 15:42) IMPRESSION: Subcapital fracture of the left hip. 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      25: CT Cervical Spine No Cont (25 @ 13:37) >  IMPRESSION:    CT HEAD : No traumatic intracranial abnormality.    Questionable small left frontal encephalocele extending into the left ethmoid sinus and slightly protruding into the left superomedial orbit.   Recommend MRI of the brain seizure protocol without and with contrast for further evaluation.    Bilateral sphenoid sinus disease.    CT CERVICAL SPINE:    No acute fracture or traumatic malalignment.  Multilevel degenerative changes.        MICROBIOLOGY DATA:    Urine Microscopic-Add On (NC) (25 @ 11:55)   White Blood Cell - Urine: 2 /HPF  Red Blood Cell - Urine: 2 /HPF  Bacteria: Moderate /HPF  Squamous Epithelial Cells: Present

## 2025-04-07 NOTE — DIETITIAN INITIAL EVALUATION ADULT - NSFNSPHYEXAMSKINFT_GEN_A_CORE
Pressure Injury 1: Right:, shoulder , Stage II  Pressure Injury 2: Right:, hip, Stage II  Pressure Injury 3: sacrum, Stage II

## 2025-04-07 NOTE — CONSULT NOTE ADULT - ASSESSMENT
Assessment and Plan:     Assessment : 89F, from home, ambulates w/ cane, w/PMH of HTN, Hypothyroidism, COPD, ? dementia admitted for further eval of ? unwitnessed fall and found to have L femoral neck Fx. Neuro consulted for reccs on fall.     Impression:     Reccs;     - Ok to obtain MRI Brain W/WO Constantino to R/O infectious Vs inflammatory etiologies given L frontal encephelocele extending into sinus spaces.   -  Assessment and Plan:     Assessment : 89F, from home, ambulates w/ cane, w/PMH of HTN, Hypothyroidism, COPD, ? dementia admitted for further eval of ? unwitnessed fall and found to have L femoral neck Fx. Neuro consulted for reccs on fall.     Impression:     Reccs;     - Ok to obtain MRI Brain W/WO Constantino to R/O infectious Vs inflammatory etiologies given L frontal encephelocele extending into sinus spaces(pt may not tolerate the MRI).   - PT eval and reccs apprecaited.  - Further W/u and management per primary team.       Informed primary team NP. Stephen.    D/W Dr. Velez.

## 2025-04-07 NOTE — CONSULT NOTE ADULT - ASSESSMENT
Patient is a 89y old  Female who is from home, ambulates with cane, and PMH of HTN, hypothyroidism, COPD, ?dementia. now Presents to the ER for evaluation of A fall at home, was found by HHA on the floor. Unknown down time. In ER, patient AAOx2 (to self, place). States that she had a fall while attempting to stand up, fell and landed on her side. Denies head trauma. On admission, she found to have no fever but Subcapital fracture of the left hip and Bilateral sphenoid sinus disease.  The CT chest shows Bronchiectasis, hence the ID consult requested to assist with further evaluation and rule out ROWDY.    # Bilateral sphenoid sinusitis  # Bronchiectasis, R/O ROWDY  #  Subcapital fracture of the left hip after  A fall    would recommend:    1. Follow up Blood cultures  2. Sputum for AFB stain and culture X 3  3. Continue Ceftriaxone and Azithromycin until work up is done  4. Management of  Subcapital fracture of the left hip as per Ortho team    will follow the patient with you and make further recommendation based on the clinical course and Lab results  Thank you for the opportunity to participate in Ms. Romeo's care    Attending Attestation:    Spent more than 65 minutes on total encounter, more than 50 % of the visit was spent counseling and/or coordinating care by the Attending physician.       Patient is a 89y old  Female who is from home, ambulates with cane, and PMH of HTN, hypothyroidism, COPD, ?dementia. now Presents to the ER for evaluation of A fall at home, was found by HHA on the floor. Unknown down time. In ER, patient AAOx2 (to self, place). States that she had a fall while attempting to stand up, fell and landed on her side. Denies head trauma. On admission, she found to have no fever but Subcapital fracture of the left hip and Bilateral sphenoid sinus disease.  The CT chest shows Bronchiectasis, hence the ID consult requested to assist with further evaluation and rule out ROWDY.    # Bilateral sphenoid sinusitis  # Bronchiectasis, R/O ROWDY  #  Subcapital fracture of the left hip after  A fall    would recommend:    1. Follow up Blood cultures  2. Sputum for AFB stain and culture X 3  3. Continue Ceftriaxone and Azithromycin until work up is done  4. Management of  Subcapital fracture of the left hip as per Ortho team    d/w covering NP, Ray and family at the bed side     will follow the patient with you and make further recommendation based on the clinical course and Lab results  Thank you for the opportunity to participate in Ms. Romeo's care    Attending Attestation:    Spent more than 65 minutes on total encounter, more than 50 % of the visit was spent counseling and/or coordinating care by the Attending physician.

## 2025-04-07 NOTE — PROGRESS NOTE ADULT - SUBJECTIVE AND OBJECTIVE BOX
Orthopedic Progress  FIFI SR [id number]  89yFemale    Diagnosis:  89yFemale with Left Hip Femoral Neck Fracture    Patient seen this morning sitting up in bed eating breakfast , no acute complaints  Patient states she has mild pain to her left hip at rest, with worse pain with palpation and movement  Patient has not been OOB on fracture care bed rest  Pt denies Fever, Chest pain, SOB, dyspnea, paresthesias, N/V/D, abdominal pain, syncope, or pain anywhere else.       Vital Signs Last 24 Hrs  T(C): 36.4 (07 Apr 2025 05:25), Max: 36.7 (06 Apr 2025 17:24)  T(F): 97.6 (07 Apr 2025 05:25), Max: 98.1 (06 Apr 2025 17:24)  HR: 55 (07 Apr 2025 05:25) (47 - 82)  BP: 134/70 (07 Apr 2025 05:25) (101/57 - 134/70)  BP(mean): --  ABP: --  ABP(mean): --  RR: 16 (07 Apr 2025 05:25) (16 - 18)  SpO2: 95% (07 Apr 2025 05:25) (95% - 99%)    O2 Parameters below as of 07 Apr 2025 05:25  Patient On (Oxygen Delivery Method): room air    I&O's Detail    06 Apr 2025 07:01  -  07 Apr 2025 07:00  --------------------------------------------------------  IN:  Total IN: 0 mL    OUT:    Voided (mL): 400 mL  Total OUT: 400 mL    Total NET: -400 mL      Physical Exam:  General: NAD, resting in bed  Left Hip:  Skin is pink and warm. mild TTP noted over left hip. Pain with flexion of the hip at 70 degrees. Negative log roll. Negative heel strike  Lower extremity:  No calf tenderness, calves are soft. 2+pulses. NVI. 5/5 Strength of FHL/EHL/ADF/APF b/l.  Good capillary refill. Warm, well-perfused.                                      13.2   9.21  )-----------( 234      ( 07 Apr 2025 05:25 )             43.0   04-07    145  |  109[H]  |  36[H]  ----------------------------<  99  3.2[L]   |  29  |  0.97    Ca    9.7      07 Apr 2025 05:25  Phos  1.6     04-07  Mg     1.9     04-07    TPro  6.4  /  Alb  2.2[L]  /  TBili  0.4  /  DBili  x   /  AST  32  /  ALT  35  /  AlkPhos  111  04-07      Impression:  89yFemale with Left Hip Femoral Neck Fracture    Plan:  -  Pain management  -  Pending MRI left hip for chronicity determination of left hip Fx  -  DVT prophylaxis with heparin and Venodynes  -  Fracture care with bedrest now, NWB of the affected extremity  -  Surgeon:  Dr. Moreno  -  Medical Optimization/clearance  -  Chlorhexadine wipe and Povidone Iodine nasal swabs ordered   -  Consent: Pending  -  Case d/w Dr. Moreno

## 2025-04-07 NOTE — CONSULT NOTE ADULT - SUBJECTIVE AND OBJECTIVE BOX
Date of Service  25 @ 12:29    CHIEF COMPLAINT:Patient is a 89y old  Female who presents with a chief complaint of fall       HPI:  89F, from home, ambulates with cane, PMH HTN, hypothyroidism, COPD, ?dementia. Presenting after fall at home ; was found by HHA on the floor. Unknown down time. At the time of exam, patient AAOx2 (to self, place). Does not appear a great historian. States that she had a fall. States she was attempting to stand up when she fell and landed on her side. Denies head trauma. However, not able to clarify if she had palpitations, dizziness, or other prodromal sx prior to the fall. Endorses mildly productive cough >1 week, however unable to quantify exactly how long. Denies fever, chills, chest pain, abd pain, SOB, dysuria. Patient found to have hip fracture.        PAST MEDICAL & SURGICAL HISTORY:  HTN (hypertension)      Hypothyroidism      Chronic obstructive pulmonary disease (COPD)      S/P  section          MEDICATIONS  (STANDING):  amLODIPine   Tablet 5 milliGRAM(s) Oral daily  azithromycin  IVPB 500 milliGRAM(s) IV Intermittent every 24 hours  cefTRIAXone   IVPB 1000 milliGRAM(s) IV Intermittent every 24 hours  fluticasone propionate/ salmeterol 100-50 MICROgram(s) Diskus 1 Dose(s) Inhalation two times a day  heparin   Injectable 5000 Unit(s) SubCutaneous every 12 hours  lactated ringers. 1000 milliLiter(s) (85 mL/Hr) IV Continuous <Continuous>  levothyroxine 75 MICROGram(s) Oral daily  naloxone Injectable 0.4 milliGRAM(s) IV Push once  polyethylene glycol 3350 17 Gram(s) Oral daily  potassium phosphate / sodium phosphate Powder (PHOS-NaK) 1 Packet(s) Oral every 8 hours  senna 2 Tablet(s) Oral at bedtime    MEDICATIONS  (PRN):  acetaminophen     Tablet .. 650 milliGRAM(s) Oral every 6 hours PRN Temp greater or equal to 38C (100.4F), Mild Pain (1 - 3)  albuterol    90 MICROgram(s) HFA Inhaler 2 Puff(s) Inhalation every 6 hours PRN Shortness of Breath and/or Wheezing  bisacodyl 5 milliGRAM(s) Oral daily PRN Constipation  ondansetron Injectable 4 milliGRAM(s) IV Push every 8 hours PRN Nausea and/or Vomiting  oxyCODONE    IR 2.5 milliGRAM(s) Oral every 4 hours PRN Moderate Pain (4 - 6)  oxyCODONE    IR 5 milliGRAM(s) Oral every 4 hours PRN Severe Pain (7 - 10)      FAMILY HISTORY:No hx of CAD      SOCIAL HISTORY:    [x ] Non-smoker    [x ] Alcohol-denies    Allergies    No Known Allergies    Intolerances    	    REVIEW OF SYSTEMS:  CONSTITUTIONAL: No fever, weight loss, or fatigue  EYES: No eye pain, visual disturbances, or discharge  ENT:  No difficulty hearing, tinnitus, vertigo; No sinus or throat pain  NECK: No pain or stiffness  RESPIRATORY: No cough, wheezing, chills or hemoptysis; No Shortness of Breath  CARDIOVASCULAR: No chest pain, palpitations, passing out, dizziness, or leg swelling  GASTROINTESTINAL: No abdominal or epigastric pain. No nausea, vomiting, or hematemesis; No diarrhea or constipation. No melena or hematochezia.  GENITOURINARY: No dysuria, frequency, hematuria, or incontinence  NEUROLOGICAL: No headaches, memory loss, loss of strength, numbness, or tremors  SKIN: No itching, burning, rashes, or lesions   LYMPH Nodes: No enlarged glands  ENDOCRINE: No heat or cold intolerance; No hair loss  MUSCULOSKELETAL: No joint pain or swelling; No muscle, back, or extremity pain  PSYCHIATRIC: No depression, anxiety, mood swings, or difficulty sleeping  HEME/LYMPH: No easy bruising, or bleeding gums  ALLERGY AND IMMUNOLOGIC: No hives or eczema	        PHYSICAL EXAM:  T(C): 36.4 (25 @ 05:25), Max: 36.7 (25 @ 17:24)  HR: 55 (25 @ 05:25) (47 - 82)  BP: 134/70 (25 @ 05:25) (101/57 - 134/70)  RR: 16 (25 @ 05:25) (16 - 18)  SpO2: 95% (25 @ 05:25) (95% - 99%)  Wt(kg): --  I&O's Summary    2025 07:01  -  2025 07:00  --------------------------------------------------------  IN: 0 mL / OUT: 400 mL / NET: -400 mL        Appearance: Normal	  HEENT:   Normal oral mucosa, PERRL, EOMI	  Lymphatic: No lymphadenopathy  Cardiovascular: Normal S1 S2, No JVD, No murmurs, No edema  Respiratory: Lungs clear to auscultation	  Psychiatry: A & O x 3, Mood & affect appropriate  Gastrointestinal:  Soft, Non-tender, + BS	  Skin: No rashes, No ecchymoses, No cyanosis	  Neurologic: Non-focal  Extremities: Normal range of motion, No clubbing, cyanosis or edema  Vascular: Peripheral pulses palpable 2+ bilaterally        ECG:  sinus bradycardia,nl axis	  	  	  LABS:	 	    CARDIAC MARKERS:  CARDIAC MARKERS ( 2025 00:10 )  x     / x     / x     / x     / 2.9 ng/mL      Troponin I, High Sensitivity Result: 90.0 ng/L (25 @ 05:25)  Troponin I, High Sensitivity Result: 97.8 ng/L (25 @ 00:10)  Troponin I, High Sensitivity Result: 109.9 ng/L (25 @ 11:45)                          13.2   9.21  )-----------( 234      ( 2025 05:25 )             43.0         145  |  109[H]  |  36[H]  ----------------------------<  99  3.2[L]   |  29  |  0.97    Ca    9.7      2025 05:25  Phos  1.6       Mg     1.9         TPro  6.4  /  Alb  2.2[L]  /  TBili  0.4  /  DBili  x   /  AST  32  /  ALT  35  /  AlkPhos  111      proBNP:   Lipid Profile:   HgA1c:   TSH: Thyroid Stimulating Hormone, Serum: 20.50 uU/mL ( @ 05:25)    < from: Xray Femur 2 Views, Left (25 @ 15:42) >  ACC: [id number] EXAM:  XR FEMUR 2 VIEWS LT   ORDERED BY: DUSTY RODGERS     ACC: [id number] EXAM:  XR HIPS BI WITH PELV 3-4V   ORDERED BY: DUSTY RODGERS     PROCEDURE DATE:  2025          INTERPRETATION:  Bilateral hips and pelvis and left femur. Patient had a   fall.    Bilateral hips and pelvis. There is moderate left knee degeneration.    IMPRESSION: Subcapital fracture of the left hip. 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    --- End of Report ---            PABLITO ARNETT MD; Attending Radiologist  This document has been electronically signed. 2025 10:33AM    < end of copied text >  < from: CT Cervical Spine No Cont (25 @ 13:37) >    ACC: [id number] EXAM:  CT CERVICAL SPINE   ORDERED BY: DUSTY RODGERS     ACC: [id number] EXAM:  CT BRAIN   ORDERED BY: DUSTY RODGERS     PROCEDURE DATE:  2025          INTERPRETATION:  CT HEAD AND CT CERVICAL SPINE WITHOUT CONTRAST    TECHNIQUE: Multiple axial images of the head were obtained from the skull   base to the vertex without intravenous contrast.  Multiplanar reformats   were created according to the standard protocol. Multiple axial images of   the cervical spine were obtained without intravenous contrast.    Multiplanar reformats were created according to the standard protocol.   3-D reconstruction images of the cervical spine were obtained.    INDICATION: fall    COMPARISON: None available at the time of interpretation.  ______________________    FINDINGS:    HEAD:    Questionable small left frontal encephalocele extending into the left   ethmoid sinuses and slightly protruding into the left superomedial orbit   (series 2 image 20 and series 6 image 22). Arrested pneumatization of the   left frontal sinus/recess with appearance of arachnoid granulations.    There are periventricular and subcortical white matter hypodensities,   consistent with microvascular type changes. No evidence of a large   vascular territory acute infarction.    Mild sulcal prominence compatible with age-related involutional changes.    No acute intracranial hemorrhage.  No extra-axial hemorrhage or fluid   collection. No hydrocephalus. No effacement of the basal cisterns. The   cerebellar tonsils are in normal position. The sella is grossly   unremarkable.    No acute fracture identified.  No scalp hematoma. The orbits are intact.   Bilateral sphenoid sinus mucosal thickening and bubbly secretions with   near complete opacification of the dominant left sphenoid sinus,   nonspecific, but can be seen with the sinusitis in the appropriate   clinical setting. Mastoid air cells and middle ears clear. Debris within   the left external auditory canal.    OTHER: None.    CERVICAL SPINE:    No acute fracture or traumatic malalignment. The lateral masses of C1-C2   are congruent.  Exaggerated cervical lordosis. Vertebral body heights are   maintained. No compression fracture. The posterior elements are intact.   No prevertebral soft tissue swelling.    Multilevel cervical spondylosis with marginal osteophytosis,   intervertebral disc space narrowing, endplate sclerosis/cystic changes,   ligamentum flavum calcifications, and uncovertebral and facet joint   hypertrophic changes. No CT evidence of high-grade spinal canal or   osseous neural foraminal stenosis. No aggressive osseous lesions.    The paraspinal soft tissues are grossly unremarkable. No lymphadenopathy   or neck mass.  Please refer to the separately dictated CT chest for discussion of the   thoracic structures.  ______________________    IMPRESSION:    CT HEAD :    No traumatic intracranial abnormality.    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   Recommend MRI of the brain seizure protocol without and with contrast for   further evaluation.    Bilateral sphenoid sinus disease, as above.    CT CERVICAL SPINE:    No acute fracture or traumatic malalignment.    Multilevel degenerative changes.    --- End of Report ---            RACQUEL FALCON MD; Attending Radiologist  This document has been electronically signed. 2025  2:14PM    < end of copied text >

## 2025-04-07 NOTE — PROGRESS NOTE ADULT - SUBJECTIVE AND OBJECTIVE BOX
Patient is a 89y old  Female who presents with a chief complaint of fall (07 Apr 2025 09:24)    pt seen in icu [  ], reg med floor [ x  ], bed [x  ], chair at bedside [   ], a+o x3 [x  ], lethargic [  ],  nad [ x ]      Allergies    No Known Allergies        Vitals    T(F): 97.6 (04-07-25 @ 05:25), Max: 98.1 (04-06-25 @ 17:24)  HR: 55 (04-07-25 @ 05:25) (47 - 82)  BP: 134/70 (04-07-25 @ 05:25) (101/57 - 134/70)  RR: 16 (04-07-25 @ 05:25) (16 - 18)  SpO2: 95% (04-07-25 @ 05:25) (95% - 99%)  Wt(kg): --  CAPILLARY BLOOD GLUCOSE      POCT Blood Glucose.: 178 mg/dL (06 Apr 2025 12:04)      Labs                          13.2   9.21  )-----------( 234      ( 07 Apr 2025 05:25 )             43.0       04-07    145  |  109[H]  |  36[H]  ----------------------------<  99  3.2[L]   |  29  |  0.97    Ca    9.7      07 Apr 2025 05:25  Phos  1.6     04-07  Mg     1.9     04-07    TPro  6.4  /  Alb  2.2[L]  /  TBili  0.4  /  DBili  x   /  AST  32  /  ALT  35  /  AlkPhos  111  04-07      CARDIAC MARKERS ( 07 Apr 2025 00:10 )  x     / x     / x     / x     / 2.9 ng/mL      Troponin I, High Sensitivity Result: 90.0 ng/L (04-07-25 @ 05:25)  Troponin I, High Sensitivity Result: 97.8 ng/L (04-07-25 @ 00:10)  Troponin I, High Sensitivity Result: 109.9 ng/L (04-06-25 @ 11:45)        Radiology Results      Meds    MEDICATIONS  (STANDING):  amLODIPine   Tablet 5 milliGRAM(s) Oral daily  azithromycin  IVPB 500 milliGRAM(s) IV Intermittent every 24 hours  cefTRIAXone   IVPB 1000 milliGRAM(s) IV Intermittent every 24 hours  fluticasone propionate/ salmeterol 100-50 MICROgram(s) Diskus 1 Dose(s) Inhalation two times a day  heparin   Injectable 5000 Unit(s) SubCutaneous every 12 hours  lactated ringers. 1000 milliLiter(s) (85 mL/Hr) IV Continuous <Continuous>  levothyroxine 75 MICROGram(s) Oral daily  naloxone Injectable 0.4 milliGRAM(s) IV Push once  polyethylene glycol 3350 17 Gram(s) Oral daily  potassium phosphate / sodium phosphate Powder (PHOS-NaK) 1 Packet(s) Oral every 8 hours  senna 2 Tablet(s) Oral at bedtime      MEDICATIONS  (PRN):  acetaminophen     Tablet .. 650 milliGRAM(s) Oral every 6 hours PRN Temp greater or equal to 38C (100.4F), Mild Pain (1 - 3)  albuterol    90 MICROgram(s) HFA Inhaler 2 Puff(s) Inhalation every 6 hours PRN Shortness of Breath and/or Wheezing  bisacodyl 5 milliGRAM(s) Oral daily PRN Constipation  ondansetron Injectable 4 milliGRAM(s) IV Push every 8 hours PRN Nausea and/or Vomiting  oxyCODONE    IR 2.5 milliGRAM(s) Oral every 4 hours PRN Moderate Pain (4 - 6)  oxyCODONE    IR 5 milliGRAM(s) Oral every 4 hours PRN Severe Pain (7 - 10)      Physical Exam    Neuro :  no focal deficits  Respiratory: CTA B/L  CV: RRR, S1S2, no murmurs,   Abdominal: Soft, NT, ND +BS,  Extremities: No edema, + peripheral pulses    ASSESSMENT    left femoral neck fx,   bronchiectasis with karen,   licha,    left frontal encephalocele,   h/o HTN,   hypothyroidism,   COPD,   ?dementia       PLAN    cont pain control,   ortho f/u noted  -  Pending MRI left hip for chronicity determination of left hip Fx  -  DVT prophylaxis with heparin and Venodynes  -  Fracture care with bedrest now, NWB of the affected extremity  -  Surgeon:  Dr. Moreno  -  Medical Optimization/clearance  -  Chlorhexadine wipe and Povidone Iodine nasal swabs ordered   -  Consent: Pending  cont rocephin and zithromax,    id cons  sputum afb,   pulm cons   cont bronchodilators  pft outpt   serum creat wnl   supplement potassium and phos  resume losartan   cardio cons   f/u echo   mr brain w/wo contrast   neuro cons  cont current meds      Patient is a 89y old  Female who presents with a chief complaint of fall (07 Apr 2025 09:24)    pt seen in icu [  ], reg med floor [ x  ], bed [x  ], chair at bedside [   ], a+o x3 [x  ], lethargic [  ],  nad [ x ]      Allergies    No Known Allergies        Vitals    T(F): 97.6 (04-07-25 @ 05:25), Max: 98.1 (04-06-25 @ 17:24)  HR: 55 (04-07-25 @ 05:25) (47 - 82)  BP: 134/70 (04-07-25 @ 05:25) (101/57 - 134/70)  RR: 16 (04-07-25 @ 05:25) (16 - 18)  SpO2: 95% (04-07-25 @ 05:25) (95% - 99%)  Wt(kg): --  CAPILLARY BLOOD GLUCOSE      POCT Blood Glucose.: 178 mg/dL (06 Apr 2025 12:04)      Labs                          13.2   9.21  )-----------( 234      ( 07 Apr 2025 05:25 )             43.0       04-07    145  |  109[H]  |  36[H]  ----------------------------<  99  3.2[L]   |  29  |  0.97    Ca    9.7      07 Apr 2025 05:25  Phos  1.6     04-07  Mg     1.9     04-07    TPro  6.4  /  Alb  2.2[L]  /  TBili  0.4  /  DBili  x   /  AST  32  /  ALT  35  /  AlkPhos  111  04-07      CARDIAC MARKERS ( 07 Apr 2025 00:10 )  x     / x     / x     / x     / 2.9 ng/mL      Troponin I, High Sensitivity Result: 90.0 ng/L (04-07-25 @ 05:25)  Troponin I, High Sensitivity Result: 97.8 ng/L (04-07-25 @ 00:10)  Troponin I, High Sensitivity Result: 109.9 ng/L (04-06-25 @ 11:45)        Radiology Results      Meds    MEDICATIONS  (STANDING):  amLODIPine   Tablet 5 milliGRAM(s) Oral daily  azithromycin  IVPB 500 milliGRAM(s) IV Intermittent every 24 hours  cefTRIAXone   IVPB 1000 milliGRAM(s) IV Intermittent every 24 hours  fluticasone propionate/ salmeterol 100-50 MICROgram(s) Diskus 1 Dose(s) Inhalation two times a day  heparin   Injectable 5000 Unit(s) SubCutaneous every 12 hours  lactated ringers. 1000 milliLiter(s) (85 mL/Hr) IV Continuous <Continuous>  levothyroxine 75 MICROGram(s) Oral daily  naloxone Injectable 0.4 milliGRAM(s) IV Push once  polyethylene glycol 3350 17 Gram(s) Oral daily  potassium phosphate / sodium phosphate Powder (PHOS-NaK) 1 Packet(s) Oral every 8 hours  senna 2 Tablet(s) Oral at bedtime      MEDICATIONS  (PRN):  acetaminophen     Tablet .. 650 milliGRAM(s) Oral every 6 hours PRN Temp greater or equal to 38C (100.4F), Mild Pain (1 - 3)  albuterol    90 MICROgram(s) HFA Inhaler 2 Puff(s) Inhalation every 6 hours PRN Shortness of Breath and/or Wheezing  bisacodyl 5 milliGRAM(s) Oral daily PRN Constipation  ondansetron Injectable 4 milliGRAM(s) IV Push every 8 hours PRN Nausea and/or Vomiting  oxyCODONE    IR 2.5 milliGRAM(s) Oral every 4 hours PRN Moderate Pain (4 - 6)  oxyCODONE    IR 5 milliGRAM(s) Oral every 4 hours PRN Severe Pain (7 - 10)      Physical Exam    Neuro :  no focal deficits  Respiratory: CTA B/L  CV: RRR, S1S2, no murmurs,   Abdominal: Soft, NT, ND +BS,  Extremities: No edema, + peripheral pulses    ASSESSMENT    left femoral neck fx,   bronchiectasis with karen,   licha,    demand ischemia   left frontal encephalocele,   h/o HTN,   hypothyroidism,   COPD,   ?dementia       PLAN    cont pain control,   ortho f/u noted  -  Pending MRI left hip for chronicity determination of left hip Fx  -  DVT prophylaxis with heparin and Venodynes  -  Fracture care with bedrest now, NWB of the affected extremity  -  Surgeon:  Dr. Moreno  -  Medical Optimization/clearance  -  Chlorhexadine wipe and Povidone Iodine nasal swabs ordered   -  Consent: Pending  cont rocephin and zithromax,    id cons  sputum afb,   pulm cons   cont bronchodilators   pft outpt   serum creat wnl   supplement potassium and phos  resume losartan   trop trending down   cardio cons   f/u echo   mr brain w/wo contrast if neuro agrees  neuro cons  cont current meds

## 2025-04-07 NOTE — DIETITIAN INITIAL EVALUATION ADULT - PROBLEM SELECTOR PLAN 2
CT chest - showing Bronchiectasis and peribronchial nodularity suspicious for ROWDY infection.  endorses mildly productive cough.  hx of COPD.    - Will need to r/o ROWDY.   - acid fast sputum w/culture x2.  - taisha PONCE.   - ID Dr Romo consulted.

## 2025-04-07 NOTE — DIETITIAN INITIAL EVALUATION ADULT - PROBLEM SELECTOR PLAN 3
CT HEAD: Small LEFT frontal encephalocele.     - MRI brain w/wo IV contrast.  - Neurology Dr Mejia consulted.

## 2025-04-07 NOTE — DIETITIAN INITIAL EVALUATION ADULT - PROBLEM SELECTOR PLAN 5
SCr 1.53  unclear baseline.    - proBNP 3486, however does not appear overloaded.  Looks dry.  - likely FLAVIO iso ACEi/ARB use iso poor PO intake.  - IVF.

## 2025-04-07 NOTE — CONSULT NOTE ADULT - SUBJECTIVE AND OBJECTIVE BOX
PULMONARY CONSULT NOTE      FIFI SR  MRN-[id number]    History of Present Illness:  Reason for Admission: fall  History of Present Illness:   89F, from home, ambulates with cane, PMH HTN, hypothyroidism, COPD, ?dementia. Presenting after fall at home ; was found by [facility] on the floor. Unknown down time. At the time of exam, patient AAOx2 (to self, place). Does not appear a great historian. States that she had a fall. States she was attempting to stand up when she fell and landed on her side. Denies head trauma. However, not able to clarify if she had palpitations, dizziness, or other prodromal sx prior to the fall. Endorses mildly productive cough >1 week, however unable to quantify exactly how long. Denies fever, chills, chest pain, abd pain, SOB, dysuria.         HISTORY OF PRESENT ILLNESS: As Above. Awake, alert, comfortable in bed in NAD    MEDICATIONS  (STANDING):  amLODIPine   Tablet 5 milliGRAM(s) Oral daily  azithromycin  IVPB 500 milliGRAM(s) IV Intermittent every 24 hours  cefTRIAXone   IVPB 1000 milliGRAM(s) IV Intermittent every 24 hours  fluticasone propionate/ salmeterol 100-50 MICROgram(s) Diskus 1 Dose(s) Inhalation two times a day  heparin   Injectable 5000 Unit(s) SubCutaneous every 12 hours  lactated ringers. 1000 milliLiter(s) (85 mL/Hr) IV Continuous <Continuous>  levothyroxine 75 MICROGram(s) Oral daily  naloxone Injectable 0.4 milliGRAM(s) IV Push once  polyethylene glycol 3350 17 Gram(s) Oral daily  potassium phosphate / sodium phosphate Powder (PHOS-NaK) 1 Packet(s) Oral every 8 hours  senna 2 Tablet(s) Oral at bedtime      MEDICATIONS  (PRN):  acetaminophen     Tablet .. 650 milliGRAM(s) Oral every 6 hours PRN Temp greater or equal to 38C (100.4F), Mild Pain (1 - 3)  albuterol    90 MICROgram(s) HFA Inhaler 2 Puff(s) Inhalation every 6 hours PRN Shortness of Breath and/or Wheezing  bisacodyl 5 milliGRAM(s) Oral daily PRN Constipation  ondansetron Injectable 4 milliGRAM(s) IV Push every 8 hours PRN Nausea and/or Vomiting  oxyCODONE    IR 2.5 milliGRAM(s) Oral every 4 hours PRN Moderate Pain (4 - 6)  oxyCODONE    IR 5 milliGRAM(s) Oral every 4 hours PRN Severe Pain (7 - 10)      Allergies    No Known Allergies    Intolerances        PAST MEDICAL & SURGICAL HISTORY:  HTN (hypertension)      Hypothyroidism      Chronic obstructive pulmonary disease (COPD)      S/P  section          FAMILY HISTORY:      SOCIAL HISTORY  Smoking History:     REVIEW OF SYSTEMS:    CONSTITUTIONAL:  No fevers, chills, sweats    HEENT:  Eyes:  No diplopia or blurred vision. ENT:  No earache, sore throat or runny nose.    CARDIOVASCULAR:  No pressure, squeezing, tightness, or heaviness about the chest; no palpitations.    RESPIRATORY:  Per HPI    GASTROINTESTINAL:  No abdominal pain, nausea, vomiting or diarrhea.    GENITOURINARY:  No dysuria, frequency or urgency.    NEUROLOGIC:  No paresthesias, fasciculations, seizures or weakness.    PSYCHIATRIC:  No disorder of thought or mood.    Vital Signs Last 24 Hrs  T(C): 36.4 (2025 05:25), Max: 36.7 (2025 17:24)  T(F): 97.6 (2025 05:25), Max: 98.1 (2025 17:24)  HR: 55 (2025 05:25) (47 - 82)  BP: 134/70 (2025 05:25) (101/57 - 134/70)  BP(mean): --  RR: 16 (2025 05:25) (16 - 18)  SpO2: 95% (2025 05:25) (95% - 99%)    Parameters below as of 2025 05:25  Patient On (Oxygen Delivery Method): room air      I&O's Detail    2025 07:01  -  2025 07:00  --------------------------------------------------------  IN:  Total IN: 0 mL    OUT:    Voided (mL): 400 mL  Total OUT: 400 mL    Total NET: -400 mL          PHYSICAL EXAMINATION:    GENERAL: The patient is a well-developed, well-nourished _____in no apparent distress.     HEENT: Head is normocephalic and atraumatic. Extraocular muscles are intact. Mucous membranes are moist.     NECK: Supple.     LUNGS: Rales anteriorly on left    HEART: Regular rate and rhythm without murmur.    ABDOMEN: Soft, nontender, and nondistended.  No hepatosplenomegaly is noted.    EXTREMITIES: Without any cyanosis, clubbing, rash, lesions or edema.    NEUROLOGIC: Grossly intact.      LABS:                        13.2   9.21  )-----------( 234      ( 2025 05:25 )             43.0         145  |  109[H]  |  36[H]  ----------------------------<  99  3.2[L]   |  29  |  0.97    Ca    9.7      2025 05:25  Phos  1.6       Mg     1.9         TPro  6.4  /  Alb  2.2[L]  /  TBili  0.4  /  DBili  x   /  AST  32  /  ALT  35  /  AlkPhos  111      PT/INR - ( 2025 11:45 )   PT: 10.9 sec;   INR: 0.93 ratio         PTT - ( 2025 11:45 )  PTT:31.6 sec  Urinalysis Basic - ( 2025 05:25 )    Color: x / Appearance: x / SG: x / pH: x  Gluc: 99 mg/dL / Ketone: x  / Bili: x / Urobili: x   Blood: x / Protein: x / Nitrite: x   Leuk Esterase: x / RBC: x / WBC x   Sq Epi: x / Non Sq Epi: x / Bacteria: x        CARDIAC MARKERS ( 2025 00:10 )  x     / x     / x     / x     / 2.9 ng/mL          < from: CT Abdomen and Pelvis No Cont (25 @ 13:36) >  IMPRESSION:  Impaction fracture of left femoral neck.    No CT evidence of visceral injury in the chest, abdomen and pelvis.    Bronchiectasis and peribronchial nodularity suspicious for ROWDY infection.      < end of copied text >  Lactate, Blood: 2.0 mmol/L (25 @ 00:10)  Lactate, Blood: 2.4 mmol/L (25 @ 18:00)  Lactate, Blood: 2.6 mmol/L (25 @ 14:25)  Lactate, Blood: 2.6 mmol/L (25 @ 11:45)        MICROBIOLOGY:    RADIOLOGY & ADDITIONAL STUDIES:  < from: CT Cervical Spine No Cont (25 @ 13:37) >  IMPRESSION:    CT HEAD :    No traumatic intracranial abnormality.    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   Recommend MRI of the brain seizure protocol without and with contrast for   further evaluation.    Bilateral sphenoid sinus disease, as above.    CT CERVICAL SPINE:    No acute fracture or traumatic malalignment.    Multilevel degenerative changes.      < end of copied text >    CXR:    Ct scan chest;    ekg;    echo:

## 2025-04-07 NOTE — DIETITIAN INITIAL EVALUATION ADULT - ORAL INTAKE PTA/DIET HISTORY
Spoke to pt at bedside, pt Micronesian speaking, utilized . ID: [id number], Name: [place]anna. Pt reported no new food allergies or intolerances. Pt does not take any vitamins or supplements at home. Pt's intake was unknown PTA. Reported pt doesn't know UBW. HIE: 84 lbs - 11/26/24.  No recent episodes of nausea, vomiting, diarrhea or constipation per pt. Denies any chewing/swallowing difficulties with current diet consistency. Intake is <50% per PCA. Food preferences explored and forwarded to dietary. Hypokalemia (3.2).

## 2025-04-07 NOTE — PHARMACOTHERAPY INTERVENTION NOTE - COMMENTS
Patient identified by [facility] for Patients 65 y/o and Older Report. No intervention at this time.  (Left Hip Femoral Neck Fracture).

## 2025-04-07 NOTE — DIETITIAN INITIAL EVALUATION ADULT - PROBLEM SELECTOR PLAN 1
Presenting after fall at home ; was found by [facility] on the floor. Unknown down time.   EKG - sinus jose e @53 bpm.  trops neg.   CT showing impaction fracture of LEFT femoral neck.    - lower suspicion for syncope. Likely mechanical fall.  - XR pelvis.   - Ortho consulted.   - pain control.  - fall precautions.

## 2025-04-07 NOTE — PROGRESS NOTE ADULT - SUBJECTIVE AND OBJECTIVE BOX
Patient is a 89y old  Female who presents with a chief complaint of fall (07 Apr 2025 14:44)    OVERNIGHT EVENTS: no acute changes.     Pt is awake, confused, tolerating PO, currently on bedrest.     REVIEW OF SYSTEMS:  #[id number]  limited exam due to mental status    T(C): 36.4 (04-07-25 @ 13:20), Max: 36.7 (04-06-25 @ 17:24)  HR: 72 (04-07-25 @ 13:20) (47 - 82)  BP: 132/67 (04-07-25 @ 13:20) (101/57 - 134/70)  RR: 18 (04-07-25 @ 13:20) (16 - 18)  SpO2: 95% (04-07-25 @ 13:20) (95% - 96%)  Wt(kg): --Vital Signs Last 24 Hrs  T(C): 36.4 (07 Apr 2025 13:20), Max: 36.7 (06 Apr 2025 17:24)  T(F): 97.5 (07 Apr 2025 13:20), Max: 98.1 (06 Apr 2025 17:24)  HR: 72 (07 Apr 2025 13:20) (47 - 82)  BP: 132/67 (07 Apr 2025 13:20) (101/57 - 134/70)  BP(mean): --  RR: 18 (07 Apr 2025 13:20) (16 - 18)  SpO2: 95% (07 Apr 2025 13:20) (95% - 96%)    Parameters below as of 07 Apr 2025 13:20  Patient On (Oxygen Delivery Method): room air        MEDICATIONS  (STANDING):  amLODIPine   Tablet 5 milliGRAM(s) Oral daily  azithromycin  IVPB 500 milliGRAM(s) IV Intermittent every 24 hours  cefTRIAXone   IVPB 1000 milliGRAM(s) IV Intermittent every 24 hours  fluticasone propionate/ salmeterol 100-50 MICROgram(s) Diskus 1 Dose(s) Inhalation two times a day  heparin   Injectable 5000 Unit(s) SubCutaneous every 12 hours  lactated ringers. 1000 milliLiter(s) (85 mL/Hr) IV Continuous <Continuous>  levothyroxine 75 MICROGram(s) Oral daily  LORazepam   Injectable 0.5 milliGRAM(s) IV Push once  naloxone Injectable 0.4 milliGRAM(s) IV Push once  polyethylene glycol 3350 17 Gram(s) Oral daily  potassium phosphate / sodium phosphate Powder (PHOS-NaK) 1 Packet(s) Oral every 8 hours  senna 2 Tablet(s) Oral at bedtime    MEDICATIONS  (PRN):  acetaminophen     Tablet .. 650 milliGRAM(s) Oral every 6 hours PRN Temp greater or equal to 38C (100.4F), Mild Pain (1 - 3)  albuterol    90 MICROgram(s) HFA Inhaler 2 Puff(s) Inhalation every 6 hours PRN Shortness of Breath and/or Wheezing  bisacodyl 5 milliGRAM(s) Oral daily PRN Constipation  ondansetron Injectable 4 milliGRAM(s) IV Push every 8 hours PRN Nausea and/or Vomiting  oxyCODONE    IR 2.5 milliGRAM(s) Oral every 4 hours PRN Moderate Pain (4 - 6)  oxyCODONE    IR 5 milliGRAM(s) Oral every 4 hours PRN Severe Pain (7 - 10)      PHYSICAL EXAM:  GENERAL: anxious appearing, thin/frail   EYES: clear conjunctiva  ENMT: Moist mucous membranes  NECK: Supple, No JVD, Normal thyroid  CHEST/LUNG: Clear to auscultation bilaterally; No rales, rhonchi, wheezing, or rubs  HEART: S1, S2, Regular rate and rhythm  ABDOMEN: Soft, Nontender, Nondistended; Bowel sounds present  NEURO: confused, mumbled speech  EXTREMITIES: No LE edema, no calf tenderness  LYMPH: No lymphadenopathy noted  SKIN: No rashes or lesions    Consultant(s) Notes Reviewed:  [x ] YES  [ ] NO  Care Discussed with Consultants/Other Providers [ x] YES  [ ] NO    LABS:                        13.2   9.21  )-----------( 234      ( 07 Apr 2025 05:25 )             43.0     04-07    145  |  109[H]  |  36[H]  ----------------------------<  99  3.2[L]   |  29  |  0.97    Ca    9.7      07 Apr 2025 05:25  Phos  1.6     04-07  Mg     1.9     04-07    TPro  6.4  /  Alb  2.2[L]  /  TBili  0.4  /  DBili  x   /  AST  32  /  ALT  35  /  AlkPhos  111  04-07    PT/INR - ( 06 Apr 2025 11:45 )   PT: 10.9 sec;   INR: 0.93 ratio         PTT - ( 06 Apr 2025 11:45 )  PTT:31.6 sec  CAPILLARY BLOOD GLUCOSE            Urinalysis Basic - ( 07 Apr 2025 05:25 )    Color: x / Appearance: x / SG: x / pH: x  Gluc: 99 mg/dL / Ketone: x  / Bili: x / Urobili: x   Blood: x / Protein: x / Nitrite: x   Leuk Esterase: x / RBC: x / WBC x   Sq Epi: x / Non Sq Epi: x / Bacteria: x        RADIOLOGY & ADDITIONAL TESTS:  < from: Xray Femur 2 Views, Left (04.06.25 @ 15:42) >    ACC: [id number] EXAM:  XR FEMUR 2 VIEWS LT   ORDERED BY: DUSTY RODGERS     ACC: [id number] EXAM:  XR HIPS BI WITH PELV 3-4V   ORDERED BY: DUSTY RODGERS     PROCEDURE DATE:  04/06/2025          INTERPRETATION:  Bilateral hips and pelvis and left femur. Patient had a   fall.    Bilateral hips and pelvis. There is moderate left knee degeneration.    IMPRESSION: Subcapital fracture of the left hip. 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    --- End of Report ---            PABLITO ARNETT MD; Attending Radiologist  This document has been electronically signed. Apr 7 2025 10:33AM    < end of copied text >  < from: CT Cervical Spine No Cont (04.06.25 @ 13:37) >  CT HEAD :    No traumatic intracranial abnormality.    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   Recommend MRI of the brain seizure protocol without and with contrast for   further evaluation.    Bilateral sphenoid sinus disease, as above.    CT CERVICAL SPINE:    No acute fracture or traumatic malalignment.    Multilevel degenerative changes.    --- End of Report ---      < end of copied text >  < from: CT Abdomen and Pelvis No Cont (04.06.25 @ 13:36) >    IMPRESSION:  Impaction fracture of left femoral neck.    No CT evidence of visceral injury in the chest, abdomen and pelvis.    Bronchiectasis and peribronchial nodularity suspicious for ROWDY infection.        < end of copied text >      Imaging Personally Reviewed:  [ ] YES  [ ] NO

## 2025-04-07 NOTE — DIETITIAN INITIAL EVALUATION ADULT - PROBLEM SELECTOR PLAN 6
home meds: amlodipine 5, atenolol 25, losartan 100.    - holding losartan iso FLAVIO.  - resume amlodipine, atenolol.

## 2025-04-07 NOTE — DIETITIAN INITIAL EVALUATION ADULT - PERTINENT MEDS FT
MEDICATIONS  (STANDING):  amLODIPine   Tablet 5 milliGRAM(s) Oral daily  azithromycin  IVPB 500 milliGRAM(s) IV Intermittent every 24 hours  cefTRIAXone   IVPB 1000 milliGRAM(s) IV Intermittent every 24 hours  fluticasone propionate/ salmeterol 100-50 MICROgram(s) Diskus 1 Dose(s) Inhalation two times a day  heparin   Injectable 5000 Unit(s) SubCutaneous every 12 hours  lactated ringers. 1000 milliLiter(s) (85 mL/Hr) IV Continuous <Continuous>  levothyroxine 75 MICROGram(s) Oral daily  naloxone Injectable 0.4 milliGRAM(s) IV Push once  polyethylene glycol 3350 17 Gram(s) Oral daily  potassium phosphate / sodium phosphate Powder (PHOS-NaK) 1 Packet(s) Oral every 8 hours  senna 2 Tablet(s) Oral at bedtime    MEDICATIONS  (PRN):  acetaminophen     Tablet .. 650 milliGRAM(s) Oral every 6 hours PRN Temp greater or equal to 38C (100.4F), Mild Pain (1 - 3)  albuterol    90 MICROgram(s) HFA Inhaler 2 Puff(s) Inhalation every 6 hours PRN Shortness of Breath and/or Wheezing  bisacodyl 5 milliGRAM(s) Oral daily PRN Constipation  ondansetron Injectable 4 milliGRAM(s) IV Push every 8 hours PRN Nausea and/or Vomiting  oxyCODONE    IR 2.5 milliGRAM(s) Oral every 4 hours PRN Moderate Pain (4 - 6)  oxyCODONE    IR 5 milliGRAM(s) Oral every 4 hours PRN Severe Pain (7 - 10)

## 2025-04-07 NOTE — CONSULT NOTE ADULT - SUBJECTIVE AND OBJECTIVE BOX
***************************TEMPLATE ONLY********************************     **STROKE CODE CONSULT NOTE**    Last known well time/Time of onset of symptoms:    HPI: 89y Female with PMHx of     T(C): 36.4 (25 @ 13:20), Max: 36.7 (25 @ 17:24)  HR: 72 (25 @ 13:20) (47 - 82)  BP: 132/67 (25 @ 13:20) (101/57 - 134/70)  RR: 18 (25 @ 13:20) (16 - 18)  SpO2: 95% (25 @ 13:20) (95% - 99%)    PAST MEDICAL & SURGICAL HISTORY:  HTN (hypertension)      Hypothyroidism      Chronic obstructive pulmonary disease (COPD)      S/P  section          FAMILY HISTORY:      SOCIAL HISTORY:   Patient lives with *** at ***.   Smoking status:  Drinking:  Drug Use:     ROS: ***  Constitutional: No fever, weight loss or fatigue  Eyes: No eye pain, visual disturbances, or discharge  ENMT:  No difficulty hearing, tinnitus; No sinus or throat pain  Neck: No pain or stiffness  Respiratory: No cough, wheezing, chills or hemoptysis  Cardiovascular: No chest pain, palpitations, shortness of breath, or leg swelling  Gastrointestinal: No abdominal pain. No nausea, vomiting or hematemesis; No diarrhea or constipation. Nohematochezia.  Genitourinary: No dysuria, frequency, hematuria or incontinence  Neurological: As per HPI  Skin: No itching, burning, rashes or lesions   Endocrine: No heat or cold intolerance; No hair loss  Musculoskeletal: No joint pain or swelling; No muscle, back or extremity pain  Heme/Lymph: No easy bruising or bleeding gums    MEDICATIONS  (STANDING):  amLODIPine   Tablet 5 milliGRAM(s) Oral daily  azithromycin  IVPB 500 milliGRAM(s) IV Intermittent every 24 hours  cefTRIAXone   IVPB 1000 milliGRAM(s) IV Intermittent every 24 hours  fluticasone propionate/ salmeterol 100-50 MICROgram(s) Diskus 1 Dose(s) Inhalation two times a day  heparin   Injectable 5000 Unit(s) SubCutaneous every 12 hours  lactated ringers. 1000 milliLiter(s) (85 mL/Hr) IV Continuous <Continuous>  levothyroxine 75 MICROGram(s) Oral daily  LORazepam   Injectable 0.5 milliGRAM(s) IV Push once  naloxone Injectable 0.4 milliGRAM(s) IV Push once  polyethylene glycol 3350 17 Gram(s) Oral daily  potassium phosphate / sodium phosphate Powder (PHOS-NaK) 1 Packet(s) Oral every 8 hours  senna 2 Tablet(s) Oral at bedtime    MEDICATIONS  (PRN):  acetaminophen     Tablet .. 650 milliGRAM(s) Oral every 6 hours PRN Temp greater or equal to 38C (100.4F), Mild Pain (1 - 3)  albuterol    90 MICROgram(s) HFA Inhaler 2 Puff(s) Inhalation every 6 hours PRN Shortness of Breath and/or Wheezing  bisacodyl 5 milliGRAM(s) Oral daily PRN Constipation  ondansetron Injectable 4 milliGRAM(s) IV Push every 8 hours PRN Nausea and/or Vomiting  oxyCODONE    IR 2.5 milliGRAM(s) Oral every 4 hours PRN Moderate Pain (4 - 6)  oxyCODONE    IR 5 milliGRAM(s) Oral every 4 hours PRN Severe Pain (7 - 10)    Allergies    No Known Allergies    Intolerances      Vital Signs Last 24 Hrs  T(C): 36.4 (2025 13:20), Max: 36.7 (2025 17:24)  T(F): 97.5 (2025 13:20), Max: 98.1 (2025 17:24)  HR: 72 (2025 13:20) (47 - 82)  BP: 132/67 (2025 13:20) (101/57 - 134/70)  BP(mean): --  RR: 18 (2025 13:20) (16 - 18)  SpO2: 95% (2025 13:20) (95% - 99%)    Parameters below as of 2025 13:20  Patient On (Oxygen Delivery Method): room air        Physical exam:  Constitutional: No acute distress, conversant  Eyes: Anicteric sclerae, moist conjunctivae, see below for CNs  Neck: trachea midline, FROM, supple, no thyromegaly or lymphadenopathy  Cardiovascular: Regular rate and rhythm, no murmurs, rubs, or gallops. No carotid bruits.   Pulmonary: Anterior breath sounds clear bilaterally, no crackles or wheezing. No use of accessory muscles  GI: Abdomen soft, non-distended, non-tender  Extremities: Radial and DP pulses +2, no edema    Neurologic:  -Mental status: Awake, alert, oriented to person, place, and time. Speech is fluent with intact naming, repetition, and comprehension, no dysarthria. Recent and remote memory intact. Follows commands. Attention/concentration intact. Fund of knowledge appropriate.  -Cranial nerves:   II: Visual fields are full to confrontation.  III, IV, VI: Extraocular movements are intact without nystagmus. Pupils equally round and reactive to light  V:  Facial sensation V1-V3 equal and intact   VII: Face is symmetric with normal eye closure and smile  VIII: Hearing is bilaterally intact to finger rub  IX, X: Uvula is midline and soft palate rises symmetrically  XI: Head turning and shoulder shrug are intact.  XII: Tongue protrudes midline  Motor: Normal bulk and tone. No pronator drift. Strength bilateral upper extremity 5/5, bilateral lower extremities 5/5.  Rapid alternating movements intact and symmetric  Sensation: Intact to light touch bilaterally. No neglect or extinction on double simultaneous testing.  Coordination: No dysmetria on finger-to-nose and heel-to-shin bilaterally  Reflexes: Downgoing toes bilaterally   Gait: Narrow gait and steady    NIHSS: **** ASPECT Score: ***** ICH Score: ****** (GCS)    Fingerstick Blood Glucose: CAPILLARY BLOOD GLUCOSE      POCT Blood Glucose.: 178 mg/dL (2025 12:04)    LABS:                        13.2   9.21  )-----------( 234      ( 2025 05:25 )             43.0     04-07    145  |  109[H]  |  36[H]  ----------------------------<  99  3.2[L]   |  29  |  0.97    Ca    9.7      2025 05:25  Phos  1.6     04-07  Mg     1.9     04-07    TPro  6.4  /  Alb  2.2[L]  /  TBili  0.4  /  DBili  x   /  AST  32  /  ALT  35  /  AlkPhos  111  -07    PT/INR - ( 2025 11:45 )   PT: 10.9 sec;   INR: 0.93 ratio         PTT - ( 2025 11:45 )  PTT:31.6 sec  CARDIAC MARKERS ( 2025 00:10 )  x     / x     / x     / x     / 2.9 ng/mL      Urinalysis Basic - ( 2025 05:25 )    Color: x / Appearance: x / SG: x / pH: x  Gluc: 99 mg/dL / Ketone: x  / Bili: x / Urobili: x   Blood: x / Protein: x / Nitrite: x   Leuk Esterase: x / RBC: x / WBC x   Sq Epi: x / Non Sq Epi: x / Bacteria: x        RADIOLOGY & ADDITIONAL STUDIES:      -----------------------------------------------------------------------------------------------------------------  IV-tPA (Y/N):    ***                              Bolus time:    Alteplase Dose Verification w/ RN:  Reason IV-tPA not given: ***   ***************************TEMPLATE ONLY********************************    NEUROLOGY CONSULT NOTE    Pt suboptimal historian 2/2 baseline mental status. Most of the info obtained via chart review and D/W Primary team.       HPI: 89y Female with PMHx of HTN, hypothyroidism, COPD, ?dementia presented to [facility] ED for eval post fall. Was found by HHA on floor. Unknown mechanism and down time. Unable to obtain further info about any prodromal symptoms prior to fall/head strike/LOC. During my encounter today, pt AAO X 1( able to state her name), unable to obtain any further info, some mumbling noted. Per HPI, pt fell while attempting to stand up and landed on her side, found to have L femoral neck impaction Fx.       T(C): 36.4 (25 @ 13:20), Max: 36.7 (25 @ 17:24)  HR: 72 (25 @ 13:20) (47 - 82)  BP: 132/67 (25 @ 13:20) (101/57 - 134/70)  RR: 18 (25 @ 13:20) (16 - 18)  SpO2: 95% (25 @ 13:20) (95% - 99%)    PAST MEDICAL & SURGICAL HISTORY:  HTN (hypertension)  Hypothyroidism  Chronic obstructive pulmonary disease (COPD)  S/P  section          ROS: Unable to obtain 2/2 baseline mental status.       MEDICATIONS  (STANDING):  amLODIPine   Tablet 5 milliGRAM(s) Oral daily  azithromycin  IVPB 500 milliGRAM(s) IV Intermittent every 24 hours  cefTRIAXone   IVPB 1000 milliGRAM(s) IV Intermittent every 24 hours  fluticasone propionate/ salmeterol 100-50 MICROgram(s) Diskus 1 Dose(s) Inhalation two times a day  heparin   Injectable 5000 Unit(s) SubCutaneous every 12 hours  lactated ringers. 1000 milliLiter(s) (85 mL/Hr) IV Continuous <Continuous>  levothyroxine 75 MICROGram(s) Oral daily  LORazepam   Injectable 0.5 milliGRAM(s) IV Push once  naloxone Injectable 0.4 milliGRAM(s) IV Push once  polyethylene glycol 3350 17 Gram(s) Oral daily  potassium phosphate / sodium phosphate Powder (PHOS-NaK) 1 Packet(s) Oral every 8 hours  senna 2 Tablet(s) Oral at bedtime    MEDICATIONS  (PRN):  acetaminophen     Tablet .. 650 milliGRAM(s) Oral every 6 hours PRN Temp greater or equal to 38C (100.4F), Mild Pain (1 - 3)  albuterol    90 MICROgram(s) HFA Inhaler 2 Puff(s) Inhalation every 6 hours PRN Shortness of Breath and/or Wheezing  bisacodyl 5 milliGRAM(s) Oral daily PRN Constipation  ondansetron Injectable 4 milliGRAM(s) IV Push every 8 hours PRN Nausea and/or Vomiting  oxyCODONE    IR 2.5 milliGRAM(s) Oral every 4 hours PRN Moderate Pain (4 - 6)  oxyCODONE    IR 5 milliGRAM(s) Oral every 4 hours PRN Severe Pain (7 - 10)    Allergies  No Known Allergies        Vital Signs Last 24 Hrs  T(C): 36.4 (2025 13:20), Max: 36.7 (2025 17:24)  T(F): 97.5 (2025 13:20), Max: 98.1 (2025 17:24)  HR: 72 (2025 13:20) (47 - 82)  BP: 132/67 (2025 13:20) (101/57 - 134/70)  BP(mean): --  RR: 18 (2025 13:20) (16 - 18)  SpO2: 95% (2025 13:20) (95% - 99%)    Parameters below as of 2025 13:20  Patient On (Oxygen Delivery Method): room air        Physical exam:  Constitutional: No acute distress, frail elderly female resting in bed.   Eyes: Anicteric sclerae, moist conjunctivae, see below for CNs  Neck: trachea midline.   Cardiovascular: Regular rate and rhythm.   Pulmonary: Respirations appear to be even and non labored.       Neurologic:  -Mental status: Awake, alert, oriented to self(able to state her name), unable to assess further orientation status as pt did not answer any questions asked, hypophonic with some incoherent mumbling noted.  Inconsistently follows ome simple commands, but limited attention span and concentration.     -Cranial nerves: B/L BTT+, PERRLA+, EOMI, no gaze palsy noted, face appears symmetrical, Tongue midline.  - Motor: B/L UE atleast antigravity, pt unable to participate in formal strength testing, B/L LE some toe wiggling 1/5.   - Sensation: intact X 4 to noxious.   - Reflexes: VJ 2/2 position Vs pt unable to relax.     NIHSS: Not a stroke pt.       Fingerstick Blood Glucose: CAPILLARY BLOOD GLUCOSE      POCT Blood Glucose.: 178 mg/dL (2025 12:04)    LABS:                        13.2   9.21  )-----------( 234      ( 2025 05:25 )             43.0     04-07    145  |  109[H]  |  36[H]  ----------------------------<  99  3.2[L]   |  29  |  0.97    Ca    9.7      2025 05:25  Phos  1.6     04-07  Mg     1.9     04-07    TPro  6.4  /  Alb  2.2[L]  /  TBili  0.4  /  DBili  x   /  AST  32  /  ALT  35  /  AlkPhos  111  04-07    PT/INR - ( 2025 11:45 )   PT: 10.9 sec;   INR: 0.93 ratio         PTT - ( 2025 11:45 )  PTT:31.6 sec  CARDIAC MARKERS ( 2025 00:10 )  x     / x     / x     / x     / 2.9 ng/mL      Urinalysis Basic - ( 2025 05:25 )    Color: x / Appearance: x / SG: x / pH: x  Gluc: 99 mg/dL / Ketone: x  / Bili: x / Urobili: x   Blood: x / Protein: x / Nitrite: x   Leuk Esterase: x / RBC: x / WBC x   Sq Epi: x / Non Sq Epi: x / Bacteria: x        RADIOLOGY & ADDITIONAL STUDIES:    CT Head No Cont (25 @ 13:36)   IMPRESSION:    CT HEAD :    No traumatic intracranial abnormality.    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   Recommend MRI of the brain seizure protocol without and with contrast for   further evaluation.    Bilateral sphenoid sinus disease, as above.    CT CERVICAL SPINE:    No acute fracture or traumatic malalignment.    Multilevel degenerative changes.       NEUROLOGY CONSULT NOTE    Pt suboptimal historian 2/2 baseline mental status. Most of the info obtained via chart review and D/W Primary team. Unable to utilise  services given pts limited participation.       HPI: 89y Female with PMHx of HTN, hypothyroidism, COPD, ?dementia presented to [facility] ED for eval post fall. Was found by HHA on floor. Unknown mechanism and down time. Unable to obtain further info about any prodromal symptoms prior to fall/head strike/LOC. During my encounter today, pt AAO X 1( able to state her name), unable to obtain any further info, some mumbling noted. Per HPI, pt fell while attempting to stand up and landed on her side, found to have L femoral neck impaction Fx.       T(C): 36.4 (25 @ 13:20), Max: 36.7 (25 @ 17:24)  HR: 72 (25 @ 13:20) (47 - 82)  BP: 132/67 (25 @ 13:20) (101/57 - 134/70)  RR: 18 (25 @ 13:20) (16 - 18)  SpO2: 95% (25 @ 13:20) (95% - 99%)    PAST MEDICAL & SURGICAL HISTORY:  HTN (hypertension)  Hypothyroidism  Chronic obstructive pulmonary disease (COPD)  S/P  section          ROS: Unable to obtain 2/2 baseline mental status.       MEDICATIONS  (STANDING):  amLODIPine   Tablet 5 milliGRAM(s) Oral daily  azithromycin  IVPB 500 milliGRAM(s) IV Intermittent every 24 hours  cefTRIAXone   IVPB 1000 milliGRAM(s) IV Intermittent every 24 hours  fluticasone propionate/ salmeterol 100-50 MICROgram(s) Diskus 1 Dose(s) Inhalation two times a day  heparin   Injectable 5000 Unit(s) SubCutaneous every 12 hours  lactated ringers. 1000 milliLiter(s) (85 mL/Hr) IV Continuous <Continuous>  levothyroxine 75 MICROGram(s) Oral daily  LORazepam   Injectable 0.5 milliGRAM(s) IV Push once  naloxone Injectable 0.4 milliGRAM(s) IV Push once  polyethylene glycol 3350 17 Gram(s) Oral daily  potassium phosphate / sodium phosphate Powder (PHOS-NaK) 1 Packet(s) Oral every 8 hours  senna 2 Tablet(s) Oral at bedtime    MEDICATIONS  (PRN):  acetaminophen     Tablet .. 650 milliGRAM(s) Oral every 6 hours PRN Temp greater or equal to 38C (100.4F), Mild Pain (1 - 3)  albuterol    90 MICROgram(s) HFA Inhaler 2 Puff(s) Inhalation every 6 hours PRN Shortness of Breath and/or Wheezing  bisacodyl 5 milliGRAM(s) Oral daily PRN Constipation  ondansetron Injectable 4 milliGRAM(s) IV Push every 8 hours PRN Nausea and/or Vomiting  oxyCODONE    IR 2.5 milliGRAM(s) Oral every 4 hours PRN Moderate Pain (4 - 6)  oxyCODONE    IR 5 milliGRAM(s) Oral every 4 hours PRN Severe Pain (7 - 10)    Allergies  No Known Allergies        Vital Signs Last 24 Hrs  T(C): 36.4 (2025 13:20), Max: 36.7 (2025 17:24)  T(F): 97.5 (2025 13:20), Max: 98.1 (2025 17:24)  HR: 72 (2025 13:20) (47 - 82)  BP: 132/67 (2025 13:20) (101/57 - 134/70)  BP(mean): --  RR: 18 (2025 13:20) (16 - 18)  SpO2: 95% (2025 13:20) (95% - 99%)    Parameters below as of 2025 13:20  Patient On (Oxygen Delivery Method): room air        Physical exam:  Constitutional: No acute distress, frail elderly female resting in bed.   Eyes: Anicteric sclerae, moist conjunctivae, see below for CNs  Neck: trachea midline.   Cardiovascular: Regular rate and rhythm.   Pulmonary: Respirations appear to be even and non labored.       Neurologic:  -Mental status: Awake, alert, oriented to self(able to state her name), unable to assess further orientation status as pt did not answer any questions asked, hypophonic with some incoherent mumbling noted.  Inconsistently follows ome simple commands, but limited attention span and concentration.     -Cranial nerves: B/L BTT+, PERRLA+, EOMI, no gaze palsy noted, face appears symmetrical, Tongue midline.  - Motor: B/L UE atleast antigravity, pt unable to participate in formal strength testing, B/L LE briefly antigravity, R>L, pain dependant 2/2 L femoral neck Fx.   - Sensation: intact X 4 to noxious.   - Reflexes: VJ 2/2 position Vs pt unable to relax.     NIHSS: Not a stroke pt.       Fingerstick Blood Glucose: CAPILLARY BLOOD GLUCOSE      POCT Blood Glucose.: 178 mg/dL (2025 12:04)    LABS:                        13.2   9.21  )-----------( 234      ( 2025 05:25 )             43.0     04-07    145  |  109[H]  |  36[H]  ----------------------------<  99  3.2[L]   |  29  |  0.97    Ca    9.7      2025 05:25  Phos  1.6     04-07  Mg     1.9     04-07    TPro  6.4  /  Alb  2.2[L]  /  TBili  0.4  /  DBili  x   /  AST  32  /  ALT  35  /  AlkPhos  111  04-07    PT/INR - ( 2025 11:45 )   PT: 10.9 sec;   INR: 0.93 ratio         PTT - ( 2025 11:45 )  PTT:31.6 sec  CARDIAC MARKERS ( 2025 00:10 )  x     / x     / x     / x     / 2.9 ng/mL      Urinalysis Basic - ( 2025 05:25 )    Color: x / Appearance: x / SG: x / pH: x  Gluc: 99 mg/dL / Ketone: x  / Bili: x / Urobili: x   Blood: x / Protein: x / Nitrite: x   Leuk Esterase: x / RBC: x / WBC x   Sq Epi: x / Non Sq Epi: x / Bacteria: x        RADIOLOGY & ADDITIONAL STUDIES:    CT Head No Cont (25 @ 13:36)   IMPRESSION:    CT HEAD :    No traumatic intracranial abnormality.    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   Recommend MRI of the brain seizure protocol without and with contrast for   further evaluation.    Bilateral sphenoid sinus disease, as above.    CT CERVICAL SPINE:    No acute fracture or traumatic malalignment.    Multilevel degenerative changes.

## 2025-04-07 NOTE — PROGRESS NOTE ADULT - PROBLEM SELECTOR PLAN 1
s/p fall, found by Niece at home  CT showing impaction fracture of LEFT femoral neck.  pending MRI left hip to r/o acute fx  c/w pain control - tylenol 650 mg q6h, oxycodone 2.5 mg q4h PRN for moderate pain, oxycodone 5 mg q4h PRN for severe pain  will need PT consult eventually  Ortho following

## 2025-04-07 NOTE — PATIENT PROFILE ADULT - FALL HARM RISK - HARM RISK INTERVENTIONS

## 2025-04-07 NOTE — DIETITIAN INITIAL EVALUATION ADULT - PHYSICAL ASSESSMENT SCAPULA
severe
What Type Of Note Output Would You Prefer (Optional)?: Bullet Format
Hpi Title: Evaluation of Skin Lesions
How Severe Are Your Spot(S)?: mild
Have Your Spot(S) Been Treated In The Past?: has not been treated

## 2025-04-07 NOTE — DIETITIAN NUTRITION RISK NOTIFICATION - COMMENTS
Please refer to initial dietitian note for comprehensive nutrition assessment.  Kory Mathias RD (Available on Teams)

## 2025-04-07 NOTE — CONSULT NOTE ADULT - ASSESSMENT
89F, from home, ambulates with cane, PMH HTN, hypothyroidism, COPD, ?dementia. Presenting after fall at home,Lt hip fx,pneumonia.  1.Echocardiogram.  2.Await MR-Lt hip.  3.HTN-norvasc.  4.Hypothyroid-synthroid.  5.COPD-management as per Pulm.  6.Pneumonia-abx.  7.GI and DVT prophylaxis.

## 2025-04-08 LAB
ANION GAP SERPL CALC-SCNC: 6 MMOL/L — SIGNIFICANT CHANGE UP (ref 5–17)
BUN SERPL-MCNC: 25 MG/DL — HIGH (ref 7–18)
CALCIUM SERPL-MCNC: 9.7 MG/DL — SIGNIFICANT CHANGE UP (ref 8.4–10.5)
CHLORIDE SERPL-SCNC: 103 MMOL/L — SIGNIFICANT CHANGE UP (ref 96–108)
CO2 SERPL-SCNC: 31 MMOL/L — SIGNIFICANT CHANGE UP (ref 22–31)
CREAT SERPL-MCNC: 0.9 MG/DL — SIGNIFICANT CHANGE UP (ref 0.5–1.3)
EGFR: 61 ML/MIN/1.73M2 — SIGNIFICANT CHANGE UP
EGFR: 61 ML/MIN/1.73M2 — SIGNIFICANT CHANGE UP
GLUCOSE SERPL-MCNC: 90 MG/DL — SIGNIFICANT CHANGE UP (ref 70–99)
PHOSPHATE SERPL-MCNC: 2.1 MG/DL — LOW (ref 2.5–4.5)
POTASSIUM SERPL-MCNC: 4.3 MMOL/L — SIGNIFICANT CHANGE UP (ref 3.5–5.3)
POTASSIUM SERPL-SCNC: 4.3 MMOL/L — SIGNIFICANT CHANGE UP (ref 3.5–5.3)
SODIUM SERPL-SCNC: 140 MMOL/L — SIGNIFICANT CHANGE UP (ref 135–145)

## 2025-04-08 RX ORDER — LEVOTHYROXINE SODIUM 300 MCG
88 TABLET ORAL DAILY
Refills: 0 | Status: DISCONTINUED | OUTPATIENT
Start: 2025-04-08 | End: 2025-04-15

## 2025-04-08 RX ADMIN — Medication 1 DOSE(S): at 21:34

## 2025-04-08 RX ADMIN — Medication 75 MICROGRAM(S): at 05:16

## 2025-04-08 RX ADMIN — Medication 1 DOSE(S): at 11:46

## 2025-04-08 RX ADMIN — Medication 62.5 MILLIMOLE(S): at 12:17

## 2025-04-08 RX ADMIN — CEFTRIAXONE 100 MILLIGRAM(S): 500 INJECTION, POWDER, FOR SOLUTION INTRAMUSCULAR; INTRAVENOUS at 15:04

## 2025-04-08 RX ADMIN — Medication 1 PACKET(S): at 05:16

## 2025-04-08 RX ADMIN — Medication 4 MILLILITER(S): at 07:36

## 2025-04-08 RX ADMIN — AMLODIPINE BESYLATE 5 MILLIGRAM(S): 10 TABLET ORAL at 05:16

## 2025-04-08 RX ADMIN — HEPARIN SODIUM 5000 UNIT(S): 1000 INJECTION INTRAVENOUS; SUBCUTANEOUS at 05:16

## 2025-04-08 RX ADMIN — Medication 250 MILLIGRAM(S): at 15:05

## 2025-04-08 RX ADMIN — Medication 2 TABLET(S): at 21:34

## 2025-04-08 RX ADMIN — POLYETHYLENE GLYCOL 3350 17 GRAM(S): 17 POWDER, FOR SOLUTION ORAL at 11:46

## 2025-04-08 NOTE — PROGRESS NOTE ADULT - PROBLEM SELECTOR PLAN 1
CT noted above  F/u MRI L hip  Family opting for conservative mgmt  Pain control  F/u Ortho recs for weight bearing status  F/u PT consult   Ortho following  Recommendations appreciated

## 2025-04-08 NOTE — PROGRESS NOTE ADULT - SUBJECTIVE AND OBJECTIVE BOX
Patient is a 89y old  Female who presents with a chief complaint of fall (08 Apr 2025 10:31)  Awake, alert, lying in bed in NAD. Confused but not agitated. Doing well on RA    INTERVAL HPI/OVERNIGHT EVENTS:      VITAL SIGNS:  T(F): 97.5 (04-08-25 @ 05:02)  HR: 73 (04-08-25 @ 05:02)  BP: 145/78 (04-08-25 @ 05:02)  RR: 18 (04-08-25 @ 05:02)  SpO2: 95% (04-08-25 @ 05:02)  Wt(kg): --  I&O's Detail    07 Apr 2025 07:01  -  08 Apr 2025 07:00  --------------------------------------------------------  IN:  Total IN: 0 mL    OUT:    Voided (mL): 1050 mL  Total OUT: 1050 mL    Total NET: -1050 mL              REVIEW OF SYSTEMS:    CONSTITUTIONAL:  No fevers, chills, sweats    HEENT:  Eyes:  No diplopia or blurred vision. ENT:  No earache, sore throat or runny nose.    CARDIOVASCULAR:  No pressure, squeezing, tightness, or heaviness about the chest; no palpitations.    RESPIRATORY:  Per HPI    GASTROINTESTINAL:  No abdominal pain, nausea, vomiting or diarrhea.    GENITOURINARY:  No dysuria, frequency or urgency.    NEUROLOGIC:  No paresthesias, fasciculations, seizures or weakness.    PSYCHIATRIC:  No disorder of thought or mood.      PHYSICAL EXAM:    Constitutional: Well developed and nourished  Eyes:Perrla  ENMT: normal  Neck:supple  Respiratory: good air entry  Cardiovascular: S1 S2 regular  Gastrointestinal: Soft, Non tender  Extremities: No edema  Vascular:normal  Neurological:Awake, alert  Musculoskeletal:Normal      MEDICATIONS  (STANDING):  amLODIPine   Tablet 5 milliGRAM(s) Oral daily  azithromycin  IVPB 500 milliGRAM(s) IV Intermittent every 24 hours  cefTRIAXone   IVPB 1000 milliGRAM(s) IV Intermittent every 24 hours  fluticasone propionate/ salmeterol 100-50 MICROgram(s) Diskus 1 Dose(s) Inhalation two times a day  levothyroxine 75 MICROGram(s) Oral daily  LORazepam   Injectable 0.5 milliGRAM(s) IV Push once  naloxone Injectable 0.4 milliGRAM(s) IV Push once  polyethylene glycol 3350 17 Gram(s) Oral daily  senna 2 Tablet(s) Oral at bedtime  sodium phosphate 15 milliMole(s)/250 mL IVPB 15 milliMole(s) IV Intermittent once    MEDICATIONS  (PRN):  acetaminophen     Tablet .. 650 milliGRAM(s) Oral every 6 hours PRN Temp greater or equal to 38C (100.4F), Mild Pain (1 - 3)  albuterol    90 MICROgram(s) HFA Inhaler 2 Puff(s) Inhalation every 6 hours PRN Shortness of Breath and/or Wheezing  bisacodyl 5 milliGRAM(s) Oral daily PRN Constipation  ondansetron Injectable 4 milliGRAM(s) IV Push every 8 hours PRN Nausea and/or Vomiting  oxyCODONE    IR 2.5 milliGRAM(s) Oral every 4 hours PRN Moderate Pain (4 - 6)  oxyCODONE    IR 5 milliGRAM(s) Oral every 4 hours PRN Severe Pain (7 - 10)      Allergies    No Known Allergies    Intolerances        LABS:                        13.2   9.21  )-----------( 234      ( 07 Apr 2025 05:25 )             43.0     04-08    140  |  103  |  25[H]  ----------------------------<  90  4.3   |  31  |  0.90    Ca    9.7      08 Apr 2025 05:35  Phos  2.1     04-08  Mg     1.9     04-07    TPro  6.4  /  Alb  2.2[L]  /  TBili  0.4  /  DBili  x   /  AST  32  /  ALT  35  /  AlkPhos  111  04-07      Urinalysis Basic - ( 08 Apr 2025 05:35 )    Color: x / Appearance: x / SG: x / pH: x  Gluc: 90 mg/dL / Ketone: x  / Bili: x / Urobili: x   Blood: x / Protein: x / Nitrite: x   Leuk Esterase: x / RBC: x / WBC x   Sq Epi: x / Non Sq Epi: x / Bacteria: x        CARDIAC MARKERS ( 07 Apr 2025 00:10 )  x     / x     / x     / x     / 2.9 ng/mL      CAPILLARY BLOOD GLUCOSE            RADIOLOGY & ADDITIONAL TESTS:  < from: Xray Femur 2 Views, Left (04.06.25 @ 15:42) >  IMPRESSION: Subcapital fracture of the left hip. 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      < end of copied text >  < from: CT Chest No Cont (04.06.25 @ 13:36) >  IMPRESSION:  Impaction fracture of left femoral neck.    No CT evidence of visceral injury in the chest, abdomen and pelvis.    Bronchiectasis and peribronchial nodularity suspicious for ROWDY infection.    < end of copied text >    CXR:    Ct scan chest:    ekg;    echo:

## 2025-04-08 NOTE — PROGRESS NOTE ADULT - SUBJECTIVE AND OBJECTIVE BOX
Patient is a 89y old  Female who presents with a chief complaint of fall (07 Apr 2025 16:24)    pt seen in icu [  ], reg med floor [ x  ], bed [x  ], chair at bedside [   ], a+o x3 [x  ], lethargic [  ],  nad [ x ]        Allergies    No Known Allergies        Vitals    T(F): 97.5 (04-08-25 @ 05:02), Max: 97.7 (04-07-25 @ 20:25)  HR: 73 (04-08-25 @ 05:02) (66 - 73)  BP: 145/78 (04-08-25 @ 05:02) (132/67 - 145/78)  RR: 18 (04-08-25 @ 05:02) (18 - 18)  SpO2: 95% (04-08-25 @ 05:02) (95% - 95%)  Wt(kg): --  CAPILLARY BLOOD GLUCOSE      POCT Blood Glucose.: 178 mg/dL (06 Apr 2025 12:04)      Labs                          13.2   9.21  )-----------( 234      ( 07 Apr 2025 05:25 )             43.0       04-08    140  |  103  |  25[H]  ----------------------------<  90  4.3   |  31  |  0.90    Ca    9.7      08 Apr 2025 05:35  Phos  2.1     04-08  Mg     1.9     04-07    TPro  6.4  /  Alb  2.2[L]  /  TBili  0.4  /  DBili  x   /  AST  32  /  ALT  35  /  AlkPhos  111  04-07      CARDIAC MARKERS ( 07 Apr 2025 00:10 )  x     / x     / x     / x     / 2.9 ng/mL      Troponin I, High Sensitivity Result: 90.0 ng/L (04-07-25 @ 05:25)  Troponin I, High Sensitivity Result: 97.8 ng/L (04-07-25 @ 00:10)  Troponin I, High Sensitivity Result: 109.9 ng/L (04-06-25 @ 11:45)    Blood Blood  04-06 @ 11:55   No growth at 24 hours  --  --      Blood Blood  04-06 @ 11:45   No growth at 24 hours  --  --          Radiology Results      Meds    MEDICATIONS  (STANDING):  amLODIPine   Tablet 5 milliGRAM(s) Oral daily  azithromycin  IVPB 500 milliGRAM(s) IV Intermittent every 24 hours  cefTRIAXone   IVPB 1000 milliGRAM(s) IV Intermittent every 24 hours  fluticasone propionate/ salmeterol 100-50 MICROgram(s) Diskus 1 Dose(s) Inhalation two times a day  levothyroxine 75 MICROGram(s) Oral daily  LORazepam   Injectable 0.5 milliGRAM(s) IV Push once  naloxone Injectable 0.4 milliGRAM(s) IV Push once  polyethylene glycol 3350 17 Gram(s) Oral daily  senna 2 Tablet(s) Oral at bedtime      MEDICATIONS  (PRN):  acetaminophen     Tablet .. 650 milliGRAM(s) Oral every 6 hours PRN Temp greater or equal to 38C (100.4F), Mild Pain (1 - 3)  albuterol    90 MICROgram(s) HFA Inhaler 2 Puff(s) Inhalation every 6 hours PRN Shortness of Breath and/or Wheezing  bisacodyl 5 milliGRAM(s) Oral daily PRN Constipation  ondansetron Injectable 4 milliGRAM(s) IV Push every 8 hours PRN Nausea and/or Vomiting  oxyCODONE    IR 2.5 milliGRAM(s) Oral every 4 hours PRN Moderate Pain (4 - 6)  oxyCODONE    IR 5 milliGRAM(s) Oral every 4 hours PRN Severe Pain (7 - 10)      Physical Exam    Neuro :  no focal deficits  Respiratory: CTA B/L  CV: RRR, S1S2, no murmurs,   Abdominal: Soft, NT, ND +BS,  Extremities: No edema, + peripheral pulses    ASSESSMENT    left femoral neck fx,   bronchiectasis with karen,   licha,    demand ischemia   left frontal encephalocele,   h/o HTN,   hypothyroidism,   COPD,   ?dementia       PLAN    cont pain control,   ortho f/u    -  Pending MRI left hip for chronicity determination of left hip Fx  -  DVT prophylaxis with heparin and Venodynes  -  Fracture care with bedrest now, NWB of the affected extremity  -  Surgeon:  Dr. Moreno  -  Medical Optimization/clearance  -  Chlorhexadine wipe and Povidone Iodine nasal swabs ordered   -  Consent: Pending  id f/u   f/u Sputum for AFB stain and culture X 3 to eval for karen   Continue Ceftriaxone and Azithromycin until work up is done  pulm f/u    Bronchodilators  Budesonide 0.25 mgs one unit  dose neb BID  Montelukast 10 mgs po Qhs.  serum creat wnl   potassium wnl   supplement phos   resume losartan   trop trending down   cardio f/u   f/u echo   neuro f/u  - Ok to obtain MRI Brain W/WO Constantino to R/O infectious Vs inflammatory etiologies given   L frontal encephelocele extending into sinus spaces(pt may not tolerate the MRI).   tsh elevated 20.5  ft4 1.1   endo cons   cont current meds

## 2025-04-08 NOTE — PROGRESS NOTE ADULT - SUBJECTIVE AND OBJECTIVE BOX
HPI:  89F, from home, ambulates with cane, PMH HTN, hypothyroidism, COPD, ?dementia. Presenting after fall at home ; was found by HHA on the floor. Unknown down time. At the time of exam, patient AAOx2 (to self, place). Does not appear a great historian. States that she had a fall. States she was attempting to stand up when she fell and landed on her side. Denies head trauma. However, not able to clarify if she had palpitations, dizziness, or other prodromal sx prior to the fall. Endorses mildly productive cough >1 week, however unable to quantify exactly how long. Denies fever, chills, chest pain, abd pain, SOB, dysuria.      (06 Apr 2025 16:01)      OVERNIGHT EVENTS:    No new overnight events.  Seen and examined at bedside.     REVIEW OF SYSTEMS:      EYES: no acute visual disturbances  NECK: No pain or stiffness  RESPIRATORY: No cough; No shortness of breath  CARDIOVASCULAR: No chest pain, no palpitations  GASTROINTESTINAL: No pain. No nausea, vomiting or diarrhea   NEUROLOGICAL: No headache or numbness, no tremors  MUSCULOSKELETAL: No joint pain, no muscle pain  GENITOURINARY: no dysuria, no frequency, no hesitancy  PSYCHIATRY: no depression, no anxiety  ALL OTHER  ROS negative        Vital Signs Last 24 Hrs  T(C): 36.4 (08 Apr 2025 05:02), Max: 36.5 (07 Apr 2025 20:25)  T(F): 97.5 (08 Apr 2025 05:02), Max: 97.7 (07 Apr 2025 20:25)  HR: 73 (08 Apr 2025 05:02) (66 - 73)  BP: 145/78 (08 Apr 2025 05:02) (132/67 - 145/78)  BP(mean): 80 (07 Apr 2025 20:25) (80 - 80)  RR: 18 (08 Apr 2025 05:02) (18 - 18)  SpO2: 95% (08 Apr 2025 05:02) (95% - 95%)    Parameters below as of 08 Apr 2025 05:02  Patient On (Oxygen Delivery Method): room air        ________________________________________________  PHYSICAL EXAM:    GENERAL: NAD  HEENT: Normocephalic; conjunctivae and sclerae clear;  NECK : supple, no JVD  CHEST/LUNG: Clear to auscultation; Nonlabored  HEART: S1 S2  regular  ABDOMEN: Soft, Nontender, Nondistended; Bowel sounds present  EXTREMITIES: no cyanosis; no LE edema; no calf tenderness  NERVOUS SYSTEM:  Alert; no new deficits  SKIN: warm and dry; No new rashes or lesions    _________________________________________________  CURRENT MEDICATIONS:    MEDICATIONS  (STANDING):  amLODIPine   Tablet 5 milliGRAM(s) Oral daily  azithromycin  IVPB 500 milliGRAM(s) IV Intermittent every 24 hours  cefTRIAXone   IVPB 1000 milliGRAM(s) IV Intermittent every 24 hours  fluticasone propionate/ salmeterol 100-50 MICROgram(s) Diskus 1 Dose(s) Inhalation two times a day  levothyroxine 88 MICROGram(s) Oral daily  LORazepam   Injectable 0.5 milliGRAM(s) IV Push once  naloxone Injectable 0.4 milliGRAM(s) IV Push once  polyethylene glycol 3350 17 Gram(s) Oral daily  senna 2 Tablet(s) Oral at bedtime    MEDICATIONS  (PRN):  acetaminophen     Tablet .. 650 milliGRAM(s) Oral every 6 hours PRN Temp greater or equal to 38C (100.4F), Mild Pain (1 - 3)  albuterol    90 MICROgram(s) HFA Inhaler 2 Puff(s) Inhalation every 6 hours PRN Shortness of Breath and/or Wheezing  bisacodyl 5 milliGRAM(s) Oral daily PRN Constipation  ondansetron Injectable 4 milliGRAM(s) IV Push every 8 hours PRN Nausea and/or Vomiting  oxyCODONE    IR 2.5 milliGRAM(s) Oral every 4 hours PRN Moderate Pain (4 - 6)  oxyCODONE    IR 5 milliGRAM(s) Oral every 4 hours PRN Severe Pain (7 - 10)      __________________________________________________  LABS:                          13.2   9.21  )-----------( 234      ( 07 Apr 2025 05:25 )             43.0     04-08    140  |  103  |  25[H]  ----------------------------<  90  4.3   |  31  |  0.90    Ca    9.7      08 Apr 2025 05:35  Phos  2.1     04-08  Mg     1.9     04-07    TPro  6.4  /  Alb  2.2[L]  /  TBili  0.4  /  DBili  x   /  AST  32  /  ALT  35  /  AlkPhos  111  04-07      Urinalysis Basic - ( 08 Apr 2025 05:35 )    Color: x / Appearance: x / SG: x / pH: x  Gluc: 90 mg/dL / Ketone: x  / Bili: x / Urobili: x   Blood: x / Protein: x / Nitrite: x   Leuk Esterase: x / RBC: x / WBC x   Sq Epi: x / Non Sq Epi: x / Bacteria: x      CAPILLARY BLOOD GLUCOSE          __________________________________________________  RADIOLOGY & ADDITIONAL TESTS:    Imaging Personally Reviewed:  YES    < from: Xray Femur 2 Views, Left (04.06.25 @ 15:42) >  IMPRESSION: Subcapital fracture of the left hip. 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    < end of copied text >    < from: CT Cervical Spine No Cont (04.06.25 @ 13:37) >  IMPRESSION:    CT HEAD :    No traumatic intracranial abnormality.    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   Recommend MRI of the brain seizure protocol without and with contrast for   further evaluation.    Bilateral sphenoid sinus disease, as above.    CT CERVICAL SPINE:    No acute fracture or traumatic malalignment.    Multilevel degenerative changes.    < end of copied text >    < from: CT Chest No Cont (04.06.25 @ 13:36) >  IMPRESSION:  Impaction fracture of left femoral neck.    No CT evidence of visceral injury in the chest, abdomen and pelvis.    Bronchiectasis and peribronchial nodularity suspicious for ROWDY infection.    < end of copied text >      Consultant(s) Notes Reviewed:   YES     Plan of care was discussed with patient and /or primary care giver; all questions and concerns were addressed and care was aligned with patient's wishes.    Plan discussed with attending and consulting physicians.

## 2025-04-08 NOTE — CONSULT NOTE ADULT - ASSESSMENT
89F, from home, ambulates with cane, PMH HTN, hypothyroidism, COPD, ?dementia. Presenting after fall at home ; was found by HHA on the floor.  Endocrine consulted for management of uncont hypothyroidism. Unable to get hx from the pt. x from pts relative at bedside. Unsure if pt is taking meds- how ever she lives with her family.

## 2025-04-08 NOTE — PROGRESS NOTE ADULT - SUBJECTIVE AND OBJECTIVE BOX
Patient is seen and examined at the bed side, is afebrile. She is unable to produce any sputum even with " Sputum Induction' by Respiratory team. The Blood cultures from 4/6 have NGTD.      REVIEW OF SYSTEMS: Unable to obtain due to mental status       ALLERGIES: No Known Allergies      Vital Signs Last 24 Hrs  T(C): 36.8 (08 Apr 2025 14:30), Max: 36.8 (08 Apr 2025 14:30)  T(F): 98.3 (08 Apr 2025 14:30), Max: 98.3 (08 Apr 2025 14:30)  HR: 72 (08 Apr 2025 14:30) (66 - 73)  BP: 134/76 (08 Apr 2025 14:30) (134/76 - 145/78)  BP(mean): 95 (08 Apr 2025 14:30) (80 - 95)  RR: 18 (08 Apr 2025 14:30) (18 - 18)  SpO2: 97% (08 Apr 2025 14:30) (95% - 97%)    Parameters below as of 08 Apr 2025 14:30  Patient On (Oxygen Delivery Method): room air        PHYSICAL EXAM:  GENERAL: trying to climb out of bed  CHEST/LUNG:  Air entry bilaterally  HEART: s1 and s2 present  ABDOMEN:  Nontender and  Nondistended  EXTREMITIES: No pedal  edema  CNS: Awake and  confused      LABS: No new labs                           13.2   9.21  )-----------( 234      ( 07 Apr 2025 05:25 )             43.0       04-07    145  |  109[H]  |  36[H]  ----------------------------<  99  3.2[L]   |  29  |  0.97    Ca    9.7      07 Apr 2025 05:25  Phos  1.6     04-07  Mg     1.9     04-07    TPro  6.4  /  Alb  2.2[L]  /  TBili  0.4  /  DBili  x   /  AST  32  /  ALT  35  /  AlkPhos  111  04-07    PT/INR - ( 06 Apr 2025 11:45 )   PT: 10.9 sec;   INR: 0.93 ratio         PTT - ( 06 Apr 2025 11:45 )  PTT:31.6 sec      MEDICATIONS  (STANDING):    amLODIPine   Tablet 5 milliGRAM(s) Oral daily  azithromycin  IVPB 500 milliGRAM(s) IV Intermittent every 24 hours  cefTRIAXone   IVPB 1000 milliGRAM(s) IV Intermittent every 24 hours  fluticasone propionate/ salmeterol 100-50 MICROgram(s) Diskus 1 Dose(s) Inhalation two times a day  levothyroxine 88 MICROGram(s) Oral daily  LORazepam   Injectable 0.5 milliGRAM(s) IV Push once  naloxone Injectable 0.4 milliGRAM(s) IV Push once  polyethylene glycol 3350 17 Gram(s) Oral daily  senna 2 Tablet(s) Oral at bedtime        RADIOLOGY & ADDITIONAL TESTS:    4/6/25: Xray Femur 2 Views, Left (04.06.25 @ 15:42) IMPRESSION: Subcapital fracture of the left hip. 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      4/6/25: CT Cervical Spine No Cont (04.06.25 @ 13:37) >  IMPRESSION:    CT HEAD : No traumatic intracranial abnormality.    Questionable small left frontal encephalocele extending into the left ethmoid sinus and slightly protruding into the left superomedial orbit.   Recommend MRI of the brain seizure protocol without and with contrast for further evaluation.    Bilateral sphenoid sinus disease.    CT CERVICAL SPINE:    No acute fracture or traumatic malalignment.  Multilevel degenerative changes.      MICROBIOLOGY DATA:    Culture - Blood (04.06.25 @ 11:55)   Specimen Source: Blood Blood  Culture Results: No growth at 48 Hours    Culture - Blood (04.06.25 @ 11:45)   Specimen Source: Blood Blood  Culture Results: No growth at 48 Hours      Urine Microscopic-Add On (NC) (04.06.25 @ 11:55)   White Blood Cell - Urine: 2 /HPF  Red Blood Cell - Urine: 2 /HPF  Bacteria: Moderate /HPF  Squamous Epithelial Cells: Present

## 2025-04-08 NOTE — PROGRESS NOTE ADULT - SUBJECTIVE AND OBJECTIVE BOX
Date of Service 04-08-25 @ 10:10    CHIEF COMPLAINT:Patient is a 89y old  Female who presents with a chief complaint of fall .Pt appears comfortable.    	  REVIEW OF SYSTEMS:  CONSTITUTIONAL: No fever, weight loss, or fatigue  EYES: No eye pain, visual disturbances, or discharge  ENT:  No difficulty hearing, tinnitus, vertigo; No sinus or throat pain  NECK: No pain or stiffness  RESPIRATORY: No cough, wheezing, chills or hemoptysis; No Shortness of Breath  CARDIOVASCULAR: No chest pain, palpitations, passing out, dizziness, or leg swelling  GASTROINTESTINAL: No abdominal or epigastric pain. No nausea, vomiting, or hematemesis; No diarrhea or constipation. No melena or hematochezia.  GENITOURINARY: No dysuria, frequency, hematuria, or incontinence  NEUROLOGICAL: No headaches, memory loss, loss of strength, numbness, or tremors  SKIN: No itching, burning, rashes, or lesions   LYMPH Nodes: No enlarged glands  ENDOCRINE: No heat or cold intolerance; No hair loss  MUSCULOSKELETAL: No joint pain or swelling; No muscle, back, or extremity pain  PSYCHIATRIC: No depression, anxiety, mood swings, or difficulty sleeping  HEME/LYMPH: No easy bruising, or bleeding gums  ALLERGY AND IMMUNOLOGIC: No hives or eczema	        PHYSICAL EXAM:  T(C): 36.4 (04-08-25 @ 05:02), Max: 36.5 (04-07-25 @ 20:25)  HR: 73 (04-08-25 @ 05:02) (66 - 73)  BP: 145/78 (04-08-25 @ 05:02) (132/67 - 145/78)  RR: 18 (04-08-25 @ 05:02) (18 - 18)  SpO2: 95% (04-08-25 @ 05:02) (95% - 95%)  Wt(kg): --  I&O's Summary    07 Apr 2025 07:01  -  08 Apr 2025 07:00  --------------------------------------------------------  IN: 0 mL / OUT: 1050 mL / NET: -1050 mL        Appearance: Normal	  HEENT:   Normal oral mucosa, PERRL, EOMI	  Lymphatic: No lymphadenopathy  Cardiovascular: Normal S1 S2, No JVD, No murmurs, No edema  Respiratory: Lungs clear to auscultation	  Psychiatry: A & O x 3, Mood & affect appropriate  Gastrointestinal:  Soft, Non-tender, + BS	  Skin: No rashes, No ecchymoses, No cyanosis	  Neurologic: Non-focal  Extremities: Normal range of motion, No clubbing, cyanosis or edema  Vascular: Peripheral pulses palpable 2+ bilaterally    MEDICATIONS  (STANDING):  amLODIPine   Tablet 5 milliGRAM(s) Oral daily  azithromycin  IVPB 500 milliGRAM(s) IV Intermittent every 24 hours  cefTRIAXone   IVPB 1000 milliGRAM(s) IV Intermittent every 24 hours  fluticasone propionate/ salmeterol 100-50 MICROgram(s) Diskus 1 Dose(s) Inhalation two times a day  levothyroxine 75 MICROGram(s) Oral daily  LORazepam   Injectable 0.5 milliGRAM(s) IV Push once  naloxone Injectable 0.4 milliGRAM(s) IV Push once  polyethylene glycol 3350 17 Gram(s) Oral daily  senna 2 Tablet(s) Oral at bedtime  sodium phosphate 15 milliMole(s)/250 mL IVPB 15 milliMole(s) IV Intermittent once    	  	  LABS:	 	    CARDIAC MARKERS:  CARDIAC MARKERS ( 07 Apr 2025 00:10 )  x     / x     / x     / x     / 2.9 ng/mL      Troponin I, High Sensitivity Result: 90.0 ng/L (04-07 @ 05:25)  Troponin I, High Sensitivity Result: 97.8 ng/L (04-07 @ 00:10)  Troponin I, High Sensitivity Result: 109.9 ng/L (04-06 @ 11:45)                            13.2   9.21  )-----------( 234      ( 07 Apr 2025 05:25 )             43.0     04-08    140  |  103  |  25[H]  ----------------------------<  90  4.3   |  31  |  0.90    Ca    9.7      08 Apr 2025 05:35  Phos  2.1     04-08  Mg     1.9     04-07    TPro  6.4  /  Alb  2.2[L]  /  TBili  0.4  /  DBili  x   /  AST  32  /  ALT  35  /  AlkPhos  111  04-07      TSH: Thyroid Stimulating Hormone, Serum: 20.50 uU/mL (04-07 @ 05:25)

## 2025-04-08 NOTE — CONSULT NOTE ADULT - PROBLEM SELECTOR RECOMMENDATION 2
Bronchodilators  Budesonide 0.25 mgs one unit  dose neb BID  Montelukast 10 mgs po Qhs
tx per prim team

## 2025-04-08 NOTE — PROGRESS NOTE ADULT - PROBLEM SELECTOR PLAN 9
Takes synthroid at home  Increase synthroid dose to 88 mcg   Dr. Zaman, Endocrine, following  Recommendations appreciated Takes synthroid at home  TSH 20.50  Free T3 1.10  Free T4 1.1  Increase synthroid dose to 88 mcg   Dr. Zaman, Endocrine, following  Recommendations appreciated

## 2025-04-08 NOTE — PROGRESS NOTE ADULT - ASSESSMENT
89 y/o F, Czech speaking from home, ambulates with cane, PMH HTN, hypothyroidism, COPD, dementia. Presenting after fall at home ; was found by Niece on the floor.    CT HEAD: Small LEFT frontal encephalocele.   CT C/A/P: impaction fracture of LEFT femoral neck. Bronchiectasis and peribronchial nodularity suspicious for ROWDY infection.   SCr 1.53. Trop 109, proBNP 3k.   Admitted for mechanical fall, bronchiectasis exacerbation, ROWDY workup, and MONO..     Troponin downtrended to 90, Cardiology Dr. Richmond consulted. awaiting echo.   Mono improved, SCr now 0.97.   Ortho following rec MRI left hip to r/o acute fx.   Neuro Dr. 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   ID Dr. Romo consulted for concern of ROWDY infection, awaiting sputum cultures, blood cultures.

## 2025-04-08 NOTE — PROGRESS NOTE ADULT - ASSESSMENT
89F, from home, ambulates with cane, PMH HTN, hypothyroidism, COPD, ?dementia. Presenting after fall at home,Lt hip fx,pneumonia.  1.Echocardiogram.  2.Await MR-Lt hip.  3.HTN-norvasc.  4.Hypothyroid-synthroid, inc dose 100mcg qd.  5.COPD-management as per Pulm.  6.Pneumonia-abx.  7.GI and DVT prophylaxis.

## 2025-04-08 NOTE — CONSULT NOTE ADULT - SUBJECTIVE AND OBJECTIVE BOX
Patient is a 89y old  Female who presents with a chief complaint of fall (2025 10:09)      HPI:  89F, from home, ambulates with cane, PMH HTN, hypothyroidism, COPD, ?dementia. Presenting after fall at home ; was found by HHA on the floor. Unknown down time. At the time of exam, patient AAOx2 (to self, place). Does not appear a great historian. States that she had a fall. States she was attempting to stand up when she fell and landed on her side. Denies head trauma. However, not able to clarify if she had palpitations, dizziness, or other prodromal sx prior to the fall. Endorses mildly productive cough >1 week, however unable to quantify exactly how long. Denies fever, chills, chest pain, abd pain, SOB, dysuria.      (2025 16:01)      PAST MEDICAL & SURGICAL HISTORY:  HTN (hypertension)      Hypothyroidism      Chronic obstructive pulmonary disease (COPD)      S/P  section             MEDICATIONS  (STANDING):  amLODIPine   Tablet 5 milliGRAM(s) Oral daily  azithromycin  IVPB 500 milliGRAM(s) IV Intermittent every 24 hours  cefTRIAXone   IVPB 1000 milliGRAM(s) IV Intermittent every 24 hours  fluticasone propionate/ salmeterol 100-50 MICROgram(s) Diskus 1 Dose(s) Inhalation two times a day  levothyroxine 75 MICROGram(s) Oral daily  LORazepam   Injectable 0.5 milliGRAM(s) IV Push once  naloxone Injectable 0.4 milliGRAM(s) IV Push once  polyethylene glycol 3350 17 Gram(s) Oral daily  senna 2 Tablet(s) Oral at bedtime  sodium phosphate 15 milliMole(s)/250 mL IVPB 15 milliMole(s) IV Intermittent once    MEDICATIONS  (PRN):  acetaminophen     Tablet .. 650 milliGRAM(s) Oral every 6 hours PRN Temp greater or equal to 38C (100.4F), Mild Pain (1 - 3)  albuterol    90 MICROgram(s) HFA Inhaler 2 Puff(s) Inhalation every 6 hours PRN Shortness of Breath and/or Wheezing  bisacodyl 5 milliGRAM(s) Oral daily PRN Constipation  ondansetron Injectable 4 milliGRAM(s) IV Push every 8 hours PRN Nausea and/or Vomiting  oxyCODONE    IR 2.5 milliGRAM(s) Oral every 4 hours PRN Moderate Pain (4 - 6)  oxyCODONE    IR 5 milliGRAM(s) Oral every 4 hours PRN Severe Pain (7 - 10)      FAMILY HISTORY:      SOCIAL HISTORY:      REVIEW OF SYSTEMS:  CONSTITUTIONAL: No fever, weight loss, or fatigue  EYES: No eye pain, visual disturbances, or discharge  ENT:  No difficulty hearing, tinnitus, vertigo; No sinus or throat pain  NECK: No pain or stiffness  RESPIRATORY: No cough, wheezing, chills or hemoptysis; No Shortness of Breath  CARDIOVASCULAR: No chest pain, palpitations, passing out, dizziness, or leg swelling  GASTROINTESTINAL: No abdominal or epigastric pain. No nausea, vomiting, or hematemesis; No diarrhea or constipation. No melena or hematochezia.  GENITOURINARY: No dysuria, frequency, hematuria, or incontinence  NEUROLOGICAL: No headaches, memory loss, loss of strength, numbness, or tremors  SKIN: No itching, burning, rashes, or lesions   LYMPH Nodes: No enlarged glands  ENDOCRINE: No heat or cold intolerance; No hair loss  MUSCULOSKELETAL: No joint pain or swelling; No muscle, back, or extremity pain  PSYCHIATRIC: No depression, anxiety, mood swings, or difficulty sleeping  HEME/LYMPH: No easy bruising, or bleeding gums  ALLERGY AND IMMUNOLOGIC: No hives or eczema	        Vital Signs Last 24 Hrs  T(C): 36.4 (2025 05:02), Max: 36.5 (2025 20:25)  T(F): 97.5 (2025 05:02), Max: 97.7 (2025 20:25)  HR: 73 (2025 05:02) (66 - 73)  BP: 145/78 (2025 05:02) (132/67 - 145/78)  BP(mean): 80 (2025 20:25) (80 - 80)  RR: 18 (2025 05:02) (18 - 18)  SpO2: 95% (2025 05:02) (95% - 95%)    Parameters below as of 2025 05:02  Patient On (Oxygen Delivery Method): room air          Constitutional:    NC/AT:    HEENT:    Neck:  No JVD, bruits or thyromegaly    Respiratory:  Clear without rales or rhonchi    Cardiovascular:  RR without murmur, rub or gallop.    Gastrointestinal: Soft without hepatosplenomegaly.    Extremities: without cyanosis, clubbing or edema.    Neurological:  Oriented   x      . No gross sensory or motor defects.        LABS:                        13.2   9.21  )-----------( 234      ( 2025 05:25 )             43.0     04-08    140  |  103  |  25[H]  ----------------------------<  90  4.3   |  31  |  0.90    Ca    9.7      2025 05:35  Phos  2.1     04-08  Mg     1.9     04-07    TPro  6.4  /  Alb  2.2[L]  /  TBili  0.4  /  DBili  x   /  AST  32  /  ALT  35  /  AlkPhos  111  04-07    CARDIAC MARKERS ( 2025 00:10 )  x     / x     / x     / x     / 2.9 ng/mL      PT/INR - ( 2025 11:45 )   PT: 10.9 sec;   INR: 0.93 ratio         PTT - ( 2025 11:45 )  PTT:31.6 sec  Urinalysis Basic - ( 2025 05:35 )    Color: x / Appearance: x / SG: x / pH: x  Gluc: 90 mg/dL / Ketone: x  / Bili: x / Urobili: x   Blood: x / Protein: x / Nitrite: x   Leuk Esterase: x / RBC: x / WBC x   Sq Epi: x / Non Sq Epi: x / Bacteria: x      CAPILLARY BLOOD GLUCOSE          RADIOLOGY & ADDITIONAL STUDIES: Patient is a 89y old  Female who presents with a chief complaint of fall (2025 10:09)      HPI:  89F, from home, ambulates with cane, PMH HTN, hypothyroidism, COPD, ?dementia. Presenting after fall at home ; was found by HHA on the floor. Unknown down time. At the time of exam, patient AAOx2 (to self, place). Does not appear a great historian. States that she had a fall. States she was attempting to stand up when she fell and landed on her side. Denies head trauma. However, not able to clarify if she had palpitations, dizziness, or other prodromal sx prior to the fall. Endorses mildly productive cough >1 week, however unable to quantify exactly how long. Denies fever, chills, chest pain, abd pain, SOB, dysuria.   Endocrine consulted for management of uncont hypothyroidism. Unable to get hx from the pt. x from pts relative at bedside. Unsure if pt is taking meds- how ever she lives with her family.    (2025 16:01)      PAST MEDICAL & SURGICAL HISTORY:  HTN (hypertension)      Hypothyroidism      Chronic obstructive pulmonary disease (COPD)      S/P  section             MEDICATIONS  (STANDING):  amLODIPine   Tablet 5 milliGRAM(s) Oral daily  azithromycin  IVPB 500 milliGRAM(s) IV Intermittent every 24 hours  cefTRIAXone   IVPB 1000 milliGRAM(s) IV Intermittent every 24 hours  fluticasone propionate/ salmeterol 100-50 MICROgram(s) Diskus 1 Dose(s) Inhalation two times a day  levothyroxine 75 MICROGram(s) Oral daily  LORazepam   Injectable 0.5 milliGRAM(s) IV Push once  naloxone Injectable 0.4 milliGRAM(s) IV Push once  polyethylene glycol 3350 17 Gram(s) Oral daily  senna 2 Tablet(s) Oral at bedtime  sodium phosphate 15 milliMole(s)/250 mL IVPB 15 milliMole(s) IV Intermittent once    MEDICATIONS  (PRN):  acetaminophen     Tablet .. 650 milliGRAM(s) Oral every 6 hours PRN Temp greater or equal to 38C (100.4F), Mild Pain (1 - 3)  albuterol    90 MICROgram(s) HFA Inhaler 2 Puff(s) Inhalation every 6 hours PRN Shortness of Breath and/or Wheezing  bisacodyl 5 milliGRAM(s) Oral daily PRN Constipation  ondansetron Injectable 4 milliGRAM(s) IV Push every 8 hours PRN Nausea and/or Vomiting  oxyCODONE    IR 2.5 milliGRAM(s) Oral every 4 hours PRN Moderate Pain (4 - 6)  oxyCODONE    IR 5 milliGRAM(s) Oral every 4 hours PRN Severe Pain (7 - 10)      FAMILY HISTORY:      SOCIAL HISTORY:      REVIEW OF SYSTEMS: NA      Vital Signs Last 24 Hrs  T(C): 36.4 (2025 05:02), Max: 36.5 (2025 20:25)  T(F): 97.5 (2025 05:02), Max: 97.7 (2025 20:25)  HR: 73 (2025 05:02) (66 - 73)  BP: 145/78 (2025 05:02) (132/67 - 145/78)  BP(mean): 80 (:25) (80 - 80)  RR: 18 (2025 05:02) (18 - 18)  SpO2: 95% (2025 05:02) (95% - 95%)    Parameters below as of 2025 05:02  Patient On (Oxygen Delivery Method): room air          Constitutional:    HEENT: nad    Neck:  No JVD, bruits or thyromegaly    Respiratory:  Clear without rales or rhonchi    Cardiovascular:  RR without murmur, rub or gallop.    Gastrointestinal: Soft without hepatosplenomegaly.    Extremities: without cyanosis, clubbing or edema.    Neurological:  Oriented   x  1    . No gross sensory or motor defects.        LABS:                        13.2   9.21  )-----------( 234      ( 2025 05:25 )             43.0     04-08    140  |  103  |  25[H]  ----------------------------<  90  4.3   |  31  |  0.90    Ca    9.7      2025 05:35  Phos  2.1     04-08  Mg     1.9     04-07    TPro  6.4  /  Alb  2.2[L]  /  TBili  0.4  /  DBili  x   /  AST  32  /  ALT  35  /  AlkPhos  111  04-07    CARDIAC MARKERS ( 2025 00:10 )  x     / x     / x     / x     / 2.9 ng/mL      PT/INR - ( 2025 11:45 )   PT: 10.9 sec;   INR: 0.93 ratio         PTT - ( 2025 11:45 )  PTT:31.6 sec  Urinalysis Basic - ( 2025 05:35 )    Color: x / Appearance: x / SG: x / pH: x  Gluc: 90 mg/dL / Ketone: x  / Bili: x / Urobili: x   Blood: x / Protein: x / Nitrite: x   Leuk Esterase: x / RBC: x / WBC x   Sq Epi: x / Non Sq Epi: x / Bacteria: x      CAPILLARY BLOOD GLUCOSE          RADIOLOGY & ADDITIONAL STUDIES:

## 2025-04-08 NOTE — PROGRESS NOTE ADULT - SUBJECTIVE AND OBJECTIVE BOX
Orthopedic Progress  FIF[facility] SR [id number]  89yFemale    Diagnosis:  89yFemale with Left Hip Femoral Neck Fracture    Patient seen this afternoon lying in bed, no acute complaints  Patient states she has mild pain to her left hip at rest, with worse pain with palpation and movement  Patient has not been OOB on fracture care bed rest  Pt denies Fever, Chest pain, SOB, dyspnea, paresthesias, N/V/D, abdominal pain, syncope, or pain anywhere else.       Vital Signs Last 24 Hrs  T(C): 36.4 (07 Apr 2025 05:25), Max: 36.7 (06 Apr 2025 17:24)  T(F): 97.6 (07 Apr 2025 05:25), Max: 98.1 (06 Apr 2025 17:24)  HR: 55 (07 Apr 2025 05:25) (47 - 82)  BP: 134/70 (07 Apr 2025 05:25) (101/57 - 134/70)  BP(mean): --  ABP: --  ABP(mean): --  RR: 16 (07 Apr 2025 05:25) (16 - 18)  SpO2: 95% (07 Apr 2025 05:25) (95% - 99%)    O2 Parameters below as of 07 Apr 2025 05:25  Patient On (Oxygen Delivery Method): room air    I&O's Detail    06 Apr 2025 07:01  -  07 Apr 2025 07:00  --------------------------------------------------------  IN:  Total IN: 0 mL    OUT:    Voided (mL): 400 mL  Total OUT: 400 mL    Total NET: -400 mL      Physical Exam:  General: NAD, resting in bed  Left Hip:  Skin is pink and warm. mild TTP noted over left hip. Pain with flexion of the hip at 70 degrees. Negative log roll. Negative heel strike  Lower extremity:  No calf tenderness, calves are soft. 2+pulses. NVI. 5/5 Strength of FHL/EHL/ADF/APF b/l.  Good capillary refill. Warm, well-perfused.                                      13.2   9.21  )-----------( 234      ( 07 Apr 2025 05:25 )             43.0   04-07    145  |  109[H]  |  36[H]  ----------------------------<  99  3.2[L]   |  29  |  0.97    Ca    9.7      07 Apr 2025 05:25  Phos  1.6     04-07  Mg     1.9     04-07    TPro  6.4  /  Alb  2.2[L]  /  TBili  0.4  /  DBili  x   /  AST  32  /  ALT  35  /  AlkPhos  111  04-07      Impression:  89yFemale with Left Hip Femoral Neck Fracture    Plan:  -  Pain management  -  Pending MRI left hip for chronicity determination of left hip Fx  -  DVT prophylaxis with heparin and Venodynes  -  Fracture care with bedrest now, NWB of the affected extremity  -  Surgeon:  Dr. Moreno  -  Medical Optimization/clearance  -  Chlorhexadine wipe and Povidone Iodine nasal swabs ordered   -  Consent: Pending  -  Case d/w Dr. Moreno    Orthopedic Progress  FIFI SR [id number]  89yFemale    Diagnosis:  89yFemale with Left Hip Femoral Neck Fracture    Patient seen this afternoon lying in bed, no acute complaints, seen with david at bedside who translated for patient  Patient is AAOx1-2 at this time, difficult to attain true subjective history from patient    Patient states she has mild pain to her left hip with movement   Patient has not been OOB on fracture care bed rest  Pt denies Fever, Chest pain, SOB, dyspnea, paresthesias, N/V/D, abdominal pain, syncope, or pain anywhere else.       Vital Signs Last 24 Hrs  T(C): 36.8 (08 Apr 2025 14:30), Max: 36.8 (08 Apr 2025 14:30)  T(F): 98.3 (08 Apr 2025 14:30), Max: 98.3 (08 Apr 2025 14:30)  HR: 72 (08 Apr 2025 14:30) (66 - 73)  BP: 134/76 (08 Apr 2025 14:30) (134/76 - 145/78)  BP(mean): 95 (08 Apr 2025 14:30) (80 - 95)  ABP: --  ABP(mean): --  RR: 18 (08 Apr 2025 14:30) (18 - 18)  SpO2: 97% (08 Apr 2025 14:30) (95% - 97%)    O2 Parameters below as of 08 Apr 2025 14:30  Patient On (Oxygen Delivery Method): room air    I&O's Detail    07 Apr 2025 07:01  -  08 Apr 2025 07:00  --------------------------------------------------------  IN:  Total IN: 0 mL    OUT:    Voided (mL): 1050 mL  Total OUT: 1050 mL    Total NET: -1050 mL      Physical Exam:  General: NAD, resting in bed  Left Hip:  Skin is pink and warm. mild TTP noted over left hip. Pain with flexion of the hip at 70 degrees. Negative log roll. Negative heel strike  Lower extremity:  No calf tenderness, calves are soft. 2+pulses. NVI. 5/5 Strength of FHL/EHL/ADF/APF b/l.  Good capillary refill. Warm, well-perfused.                                      13.2   9.21  )-----------( 234      ( 07 Apr 2025 05:25 )             43.0   04-08    140  |  103  |  25[H]  ----------------------------<  90  4.3   |  31  |  0.90    Ca    9.7      08 Apr 2025 05:35  Phos  2.1     04-08  Mg     1.9     04-07    TPro  6.4  /  Alb  2.2[L]  /  TBili  0.4  /  DBili  x   /  AST  32  /  ALT  35  /  AlkPhos  111  04-07                 Impression:  89yFemale with Left Hip Femoral Neck Fracture    Plan:  -  Pain management  -  Had long discussion with HCP patients cesar Ingram, explained risks and benefits of surgical vs conservative management, all questions and concerns answered  -  Recommendation: Conservative management , no surgical orthopedic intervention is recommended at this time PENDING MRI , if MRI shows chronic or subacute fracture to left hip will remain conservative     >  If MRI of hip shows acute fracture will have another discussion with HCP and reconsider surgical intervention  -  Pending MRI left hip for chronicity determination of left hip Fx  -  DVT prophylaxis with heparin and Venodynes  -  Daily PT: PWB to LLE at this time, OOB with assistive device  -  Case d/w Dr. Moreno

## 2025-04-09 ENCOUNTER — TRANSCRIPTION ENCOUNTER (OUTPATIENT)
Age: 89
End: 2025-04-09

## 2025-04-09 DIAGNOSIS — M25.552 PAIN IN LEFT HIP: ICD-10-CM

## 2025-04-09 LAB
ANION GAP SERPL CALC-SCNC: 8 MMOL/L — SIGNIFICANT CHANGE UP (ref 5–17)
BUN SERPL-MCNC: 20 MG/DL — HIGH (ref 7–18)
CALCIUM SERPL-MCNC: 9.5 MG/DL — SIGNIFICANT CHANGE UP (ref 8.4–10.5)
CHLORIDE SERPL-SCNC: 96 MMOL/L — SIGNIFICANT CHANGE UP (ref 96–108)
CO2 SERPL-SCNC: 30 MMOL/L — SIGNIFICANT CHANGE UP (ref 22–31)
CREAT SERPL-MCNC: 0.9 MG/DL — SIGNIFICANT CHANGE UP (ref 0.5–1.3)
EGFR: 61 ML/MIN/1.73M2 — SIGNIFICANT CHANGE UP
EGFR: 61 ML/MIN/1.73M2 — SIGNIFICANT CHANGE UP
GLUCOSE SERPL-MCNC: 79 MG/DL — SIGNIFICANT CHANGE UP (ref 70–99)
HCT VFR BLD CALC: 39.3 % — SIGNIFICANT CHANGE UP (ref 34.5–45)
HGB BLD-MCNC: 12.5 G/DL — SIGNIFICANT CHANGE UP (ref 11.5–15.5)
MAGNESIUM SERPL-MCNC: 1.7 MG/DL — SIGNIFICANT CHANGE UP (ref 1.6–2.6)
MCHC RBC-ENTMCNC: 26.7 PG — LOW (ref 27–34)
MCHC RBC-ENTMCNC: 31.8 G/DL — LOW (ref 32–36)
MCV RBC AUTO: 84 FL — SIGNIFICANT CHANGE UP (ref 80–100)
NRBC BLD AUTO-RTO: 0 /100 WBCS — SIGNIFICANT CHANGE UP (ref 0–0)
PHOSPHATE SERPL-MCNC: 3 MG/DL — SIGNIFICANT CHANGE UP (ref 2.5–4.5)
PLATELET # BLD AUTO: 219 K/UL — SIGNIFICANT CHANGE UP (ref 150–400)
POTASSIUM SERPL-MCNC: 3 MMOL/L — LOW (ref 3.5–5.3)
POTASSIUM SERPL-SCNC: 3 MMOL/L — LOW (ref 3.5–5.3)
RBC # BLD: 4.68 M/UL — SIGNIFICANT CHANGE UP (ref 3.8–5.2)
RBC # FLD: 13.9 % — SIGNIFICANT CHANGE UP (ref 10.3–14.5)
SODIUM SERPL-SCNC: 134 MMOL/L — LOW (ref 135–145)
WBC # BLD: 11.64 K/UL — HIGH (ref 3.8–10.5)
WBC # FLD AUTO: 11.64 K/UL — HIGH (ref 3.8–10.5)

## 2025-04-09 PROCEDURE — 99222 1ST HOSP IP/OBS MODERATE 55: CPT | Mod: FS

## 2025-04-09 RX ORDER — HEPARIN SODIUM 1000 [USP'U]/ML
5000 INJECTION INTRAVENOUS; SUBCUTANEOUS EVERY 8 HOURS
Refills: 0 | Status: DISCONTINUED | OUTPATIENT
Start: 2025-04-09 | End: 2025-04-14

## 2025-04-09 RX ORDER — ACETAMINOPHEN 500 MG/5ML
1000 LIQUID (ML) ORAL EVERY 8 HOURS
Refills: 0 | Status: COMPLETED | OUTPATIENT
Start: 2025-04-09 | End: 2025-04-13

## 2025-04-09 RX ORDER — LIDOCAINE HYDROCHLORIDE 20 MG/ML
3 JELLY TOPICAL DAILY
Refills: 0 | Status: DISCONTINUED | OUTPATIENT
Start: 2025-04-09 | End: 2025-04-15

## 2025-04-09 RX ORDER — PIPERACILLIN-TAZO-DEXTROSE,ISO 3.375G/5
3.38 IV SOLUTION, PIGGYBACK PREMIX FROZEN(ML) INTRAVENOUS ONCE
Refills: 0 | Status: COMPLETED | OUTPATIENT
Start: 2025-04-09 | End: 2025-04-09

## 2025-04-09 RX ORDER — PIPERACILLIN-TAZO-DEXTROSE,ISO 3.375G/5
3.38 IV SOLUTION, PIGGYBACK PREMIX FROZEN(ML) INTRAVENOUS EVERY 8 HOURS
Refills: 0 | Status: DISCONTINUED | OUTPATIENT
Start: 2025-04-09 | End: 2025-04-15

## 2025-04-09 RX ADMIN — POLYETHYLENE GLYCOL 3350 17 GRAM(S): 17 POWDER, FOR SOLUTION ORAL at 13:53

## 2025-04-09 RX ADMIN — Medication 25 GRAM(S): at 22:31

## 2025-04-09 RX ADMIN — Medication 1 DOSE(S): at 09:29

## 2025-04-09 RX ADMIN — Medication 100 MILLIEQUIVALENT(S): at 09:31

## 2025-04-09 RX ADMIN — Medication 2 TABLET(S): at 22:29

## 2025-04-09 RX ADMIN — Medication 1 DOSE(S): at 22:30

## 2025-04-09 RX ADMIN — Medication 1000 MILLIGRAM(S): at 22:29

## 2025-04-09 RX ADMIN — HEPARIN SODIUM 5000 UNIT(S): 1000 INJECTION INTRAVENOUS; SUBCUTANEOUS at 13:54

## 2025-04-09 RX ADMIN — Medication 1000 MILLIGRAM(S): at 22:59

## 2025-04-09 RX ADMIN — Medication 40 MILLIEQUIVALENT(S): at 09:31

## 2025-04-09 RX ADMIN — LIDOCAINE HYDROCHLORIDE 3 PATCH: 20 JELLY TOPICAL at 13:54

## 2025-04-09 RX ADMIN — Medication 25 GRAM(S): at 06:04

## 2025-04-09 RX ADMIN — Medication 88 MICROGRAM(S): at 05:32

## 2025-04-09 RX ADMIN — Medication 1000 MILLIGRAM(S): at 13:53

## 2025-04-09 RX ADMIN — LIDOCAINE HYDROCHLORIDE 3 PATCH: 20 JELLY TOPICAL at 19:26

## 2025-04-09 RX ADMIN — HEPARIN SODIUM 5000 UNIT(S): 1000 INJECTION INTRAVENOUS; SUBCUTANEOUS at 22:29

## 2025-04-09 RX ADMIN — Medication 1000 MILLIGRAM(S): at 14:23

## 2025-04-09 RX ADMIN — AMLODIPINE BESYLATE 5 MILLIGRAM(S): 10 TABLET ORAL at 05:32

## 2025-04-09 RX ADMIN — Medication 25 GRAM(S): at 13:53

## 2025-04-09 RX ADMIN — Medication 200 GRAM(S): at 02:58

## 2025-04-09 NOTE — CONSULT NOTE ADULT - PROBLEM SELECTOR PROBLEM 2
Bronchiectasis with acute exacerbation
Chronic obstructive pulmonary disease (COPD)
Fracture of femoral neck, left

## 2025-04-09 NOTE — CONSULT NOTE ADULT - SUBJECTIVE AND OBJECTIVE BOX
Source of information: [facility] SR, Chart review  Patient language: English  : n/a    HPI:  89F, from home, ambulates with cane, PMH HTN, hypothyroidism, COPD, ?dementia. Presenting after fall at home ; was found by HHA on the floor. Unknown down time. At the time of exam, patient AAOx2 (to self, place). Does not appear a great historian. States that she had a fall. States she was attempting to stand up when she fell and landed on her side. Denies head trauma. However, not able to clarify if she had palpitations, dizziness, or other prodromal sx prior to the fall. Endorses mildly productive cough >1 week, however unable to quantify exactly how long. Denies fever, chills, chest pain, abd pain, SOB, dysuria.      (2025 16:01)      Patient is a 89y old  Female who presents with a chief complaint of fall (2025 08:24)  . Pt is admitted for ..., being treated with .... Pain consulted for .... Pt seen and examined at bedside. Reports pain score ***  SCALE USED: (1-10 VNRS). Pt describes pain as .... radiating to... alleviated by pain medication... exacerbated by movement... Pt tolerating PO diet. Denies lethargy, nausea, vomiting, constipation, itchiness. Reports last BM ***. Patient stated goal for pain control: to be able to take deep breaths, get out of bed to chair and ambulate with tolerable pain control. Pt denies taking medications for pain at home.     PAST MEDICAL & SURGICAL HISTORY:  HTN (hypertension)      Hypothyroidism      Chronic obstructive pulmonary disease (COPD)      S/P  section          FAMILY HISTORY:      Social History:   [ ] Denies ETOH use, illicit drug use and smoking    Allergies    No Known Allergies    Intolerances        MEDICATIONS  (STANDING):  amLODIPine   Tablet 5 milliGRAM(s) Oral daily  fluticasone propionate/ salmeterol 100-50 MICROgram(s) Diskus 1 Dose(s) Inhalation two times a day  levothyroxine 88 MICROGram(s) Oral daily  LORazepam   Injectable 0.5 milliGRAM(s) IV Push once  naloxone Injectable 0.4 milliGRAM(s) IV Push once  piperacillin/tazobactam IVPB.. 3.375 Gram(s) IV Intermittent every 8 hours  polyethylene glycol 3350 17 Gram(s) Oral daily  potassium chloride   Powder 40 milliEquivalent(s) Oral once  potassium chloride  10 mEq/100 mL IVPB 10 milliEquivalent(s) IV Intermittent once  senna 2 Tablet(s) Oral at bedtime    MEDICATIONS  (PRN):  acetaminophen     Tablet .. 650 milliGRAM(s) Oral every 6 hours PRN Temp greater or equal to 38C (100.4F), Mild Pain (1 - 3)  albuterol    90 MICROgram(s) HFA Inhaler 2 Puff(s) Inhalation every 6 hours PRN Shortness of Breath and/or Wheezing  bisacodyl 5 milliGRAM(s) Oral daily PRN Constipation  ondansetron Injectable 4 milliGRAM(s) IV Push every 8 hours PRN Nausea and/or Vomiting  oxyCODONE    IR 2.5 milliGRAM(s) Oral every 4 hours PRN Moderate Pain (4 - 6)  oxyCODONE    IR 5 milliGRAM(s) Oral every 4 hours PRN Severe Pain (7 - 10)      Vital Signs Last 24 Hrs  T(C): 36.3 (2025 05:34), Max: 36.8 (2025 14:30)  T(F): 97.3 (2025 05:34), Max: 98.3 (2025 14:30)  HR: 82 (2025 05:34) (72 - 82)  BP: 137/73 (2025 05:34) (134/76 - 139/78)  BP(mean): 96 (2025 05:34) (95 - 99)  RR: 18 (2025 05:34) (17 - 18)  SpO2: 93% (2025 05:34) (93% - 97%)    Parameters below as of 2025 05:34  Patient On (Oxygen Delivery Method): room air        LABS: Reviewed.                          12.5   11.64 )-----------( 219      ( 2025 06:10 )             39.3     04-09    134[L]  |  96  |  20[H]  ----------------------------<  79  3.0[L]   |  30  |  0.90    Ca    9.5      2025 06:10  Phos  3.0     04-  Mg     1.7     04-          Urinalysis Basic - ( 2025 06:10 )    Color: x / Appearance: x / SG: x / pH: x  Gluc: 79 mg/dL / Ketone: x  / Bili: x / Urobili: x   Blood: x / Protein: x / Nitrite: x   Leuk Esterase: x / RBC: x / WBC x   Sq Epi: x / Non Sq Epi: x / Bacteria: x      CAPILLARY BLOOD GLUCOSE            Radiology: Reviewed.     ORT Score -   Family Hx of substance abuse	Female	      Male  Alcohol 	                                           1                     3  Illegal drugs	                                   2                     3  Rx drugs                                           4 	                  4  Personal Hx of substance abuse		  Alcohol 	                                          3	                  3  Illegal drugs                                     4	                  4  Rx drugs                                            5 	                  5  Age between 16- 45 years	           1                     1  hx preadolescent sexual abuse	   3 	                  0  Psychological disease		  ADD, OCD, bipolar, schizophrenia   2	          2  Depression                                           1 	          1  Total: 0    a score of 3 or lower indicates low risk for opioid abuse		  a score of 4-7 indicates moderate risk for opioid abuse		  a score of 8 or higher indicates high risk for opioid abuse  	  4AT (Assessment test for delirium & cognitive impairment)  _________________________________________________________  [1] ALERTNESS  This includes patients who may be markedly drowsy (eg. difficult to rouse and/or obviously sleepy  during assessment) or agitated/hyperactive. Observe the patient. If asleep, attempt to wake with  speech or gentle touch on shoulder. Ask the patient to state their name and address to assist rating.  Normal (fully alert, but not agitated, throughout assessment) 0  Mild sleepiness for <10 seconds after waking, then normal 0  Clearly abnormal 4    [2] AMT4  Age, date of birth, place (name of the hospital or building), current year.  No mistakes 0  1 mistake 1  2 or more mistakes/untestable 2    [3] ATTENTION  Ask the patient: “Please tell me the months of the year in backwards order, starting at December.”  To assist initial understanding one prompt of “what is the month before December?” is permitted.  Months of the year backwards Achieves 7 months or more correctly 0  Starts but scores <7 months / refuses to start 1   Untestable (cannot start because unwell, drowsy, inattentive) 2    [4] ACUTE CHANGE OR FLUCTUATING COURSE  Evidence of significant change or fluctuation in: alertness, cognition, other mental function  (eg. paranoia, hallucinations) arising over the last 2 weeks and still evident in last 24hrs  No 0  Yes 4    4 or above: possible delirium +/- cognitive impairment  1-3: possible cognitive impairment  0: delirium or severe cognitive impairment unlikely (but delirium still possible if [4] information incomplete)    4AT SCORE: 0    REVIEW OF SYSTEMS:  CONSTITUTIONAL: No fever or fatigue  HEENT:  No difficulty hearing, no change in vision  NECK: No pain or stiffness  RESPIRATORY: No cough, wheezing, chills or hemoptysis; No shortness of breath  CARDIOVASCULAR: No chest pain, palpitations, dizziness, or leg swelling  GASTROINTESTINAL: No loss of appetite, decreased PO intake. No abdominal or epigastric pain. No nausea, vomiting; No diarrhea or constipation.   GENITOURINARY: No dysuria, frequency, hematuria, retention or incontinence  MUSCULOSKELETAL: No joint pain or swelling; No muscle, back, or extremity pain, no upper or lower motor strength weakness, no saddle anesthesia, bowel/bladder incontinence, no falls   NEURO: No headaches, No numbness/tingling b/l LE, No weakness  ENDOCRINE: No polyuria, polydipsia, heat or cold intolerance; No hair loss  PSYCHIATRIC: No depression, anxiety or difficulty sleeping    PHYSICAL EXAM:  GENERAL:  Alert & Oriented X4, cooperative, NAD, Good concentration. Speech is clear.   RESPIRATORY: Respirations even and unlabored. Clear to auscultation bilaterally; No rales, rhonchi, wheezing, or rubs  CARDIOVASCULAR: Normal S1/S2, regular rate and rhythm; No murmurs, rubs, or gallops. No JVD.   GASTROINTESTINAL:  Soft, Nontender, Nondistended; Bowel sounds present  PERIPHERAL VASCULAR:  Extremities warm without edema. 2+ Peripheral Pulses, No cyanosis, No calf tenderness  MUSCULOSKELETAL: Motor Strength 5/5 B/L upper and lower extremities; moves all extremities equally against gravity; ROM intact; negative SLR; No tenderness on palpation of all joints.   SKIN: Warm, dry, intact. No rashes, lesions, scars or wounds.     Risk factors associated with adverse outcomes related to opioid treatment  [ ]  Concurrent benzodiazepine use  [ ]  History/ Active substance use or alcohol use disorder  [ ] Psychiatric co-morbidity  [ ] Sleep apnea  [ ] COPD  [ ] BMI> 35  [ ] Liver dysfunction  [ ] Renal dysfunction  [ ] CHF  [ ] Smoker  [ ]  Age > 60 years    [ ]  [place]  Reviewed and Copied to Chart. See below.    Plan of care and goal oriented pain management treatment options were discussed with patient and /or primary care giver; all questions and concerns were addressed and care was aligned with patient's wishes.    Educated patient on goal oriented pain management treatment options        Source of information: [facility] SR, Chart review  Patient language: English  : n/a    HPI:  89F, from home, ambulates with cane, PMH HTN, hypothyroidism, COPD, ?dementia. Presenting after fall at home ; was found by HHA on the floor. Unknown down time. At the time of exam, patient AAOx2 (to self, place). Does not appear a great historian. States that she had a fall. States she was attempting to stand up when she fell and landed on her side. Denies head trauma. However, not able to clarify if she had palpitations, dizziness, or other prodromal sx prior to the fall. Endorses mildly productive cough >1 week, however unable to quantify exactly how long. Denies fever, chills, chest pain, abd pain, SOB, dysuria.      (2025 16:01)      Patient is a 89y old  Female who presents with a chief complaint of fall (2025 08:24)  . Pt is admitted for ..., being treated with .... Pain consulted for .... Pt seen and examined at bedside. Reports pain score ***  SCALE USED: (1-10 VNRS). Pt describes pain as .... radiating to... alleviated by pain medication... exacerbated by movement... Pt tolerating PO diet. Denies lethargy, nausea, vomiting, constipation, itchiness. Reports last BM ***. Patient stated goal for pain control: to be able to take deep breaths, get out of bed to chair and ambulate with tolerable pain control. Pt denies taking medications for pain at home.         F/u MRI L hip  Family opting for conservative mgmt  Pain control  CT C/A/P: impaction fracture of LEFT femoral neck. Bronchiectasis and peribronchial nodularity suspicious for ROWDY infection.   SCr 1.53. Trop 109, proBNP 3k.   Admitted for mechanical fall, bronchiectasis exacerbation, ROWDY workup, and MONO..     Troponin downtrended to 90, Cardiology Dr. Richmond consulted. awaiting echo.   Mono improved, SCr now 0.97.   Ortho following rec MRI left hip to r/o acute fx.   Neuro Dr. 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     PAST MEDICAL & SURGICAL HISTORY:  HTN (hypertension)      Hypothyroidism      Chronic obstructive pulmonary disease (COPD)      S/P  section          FAMILY HISTORY:      Social History:   [ ] Denies ETOH use, illicit drug use and smoking    Allergies    No Known Allergies    Intolerances        MEDICATIONS  (STANDING):  amLODIPine   Tablet 5 milliGRAM(s) Oral daily  fluticasone propionate/ salmeterol 100-50 MICROgram(s) Diskus 1 Dose(s) Inhalation two times a day  levothyroxine 88 MICROGram(s) Oral daily  LORazepam   Injectable 0.5 milliGRAM(s) IV Push once  naloxone Injectable 0.4 milliGRAM(s) IV Push once  piperacillin/tazobactam IVPB.. 3.375 Gram(s) IV Intermittent every 8 hours  polyethylene glycol 3350 17 Gram(s) Oral daily  potassium chloride   Powder 40 milliEquivalent(s) Oral once  potassium chloride  10 mEq/100 mL IVPB 10 milliEquivalent(s) IV Intermittent once  senna 2 Tablet(s) Oral at bedtime    MEDICATIONS  (PRN):  acetaminophen     Tablet .. 650 milliGRAM(s) Oral every 6 hours PRN Temp greater or equal to 38C (100.4F), Mild Pain (1 - 3)  albuterol    90 MICROgram(s) HFA Inhaler 2 Puff(s) Inhalation every 6 hours PRN Shortness of Breath and/or Wheezing  bisacodyl 5 milliGRAM(s) Oral daily PRN Constipation  ondansetron Injectable 4 milliGRAM(s) IV Push every 8 hours PRN Nausea and/or Vomiting  oxyCODONE    IR 2.5 milliGRAM(s) Oral every 4 hours PRN Moderate Pain (4 - 6)  oxyCODONE    IR 5 milliGRAM(s) Oral every 4 hours PRN Severe Pain (7 - 10)      Vital Signs Last 24 Hrs  T(C): 36.3 (2025 05:34), Max: 36.8 (2025 14:30)  T(F): 97.3 (2025 05:34), Max: 98.3 (2025 14:30)  HR: 82 (2025 05:34) (72 - 82)  BP: 137/73 (2025 05:34) (134/76 - 139/78)  BP(mean): 96 (2025 05:34) (95 - 99)  RR: 18 (2025 05:34) (17 - 18)  SpO2: 93% (2025 05:34) (93% - 97%)    Parameters below as of 2025 05:34  Patient On (Oxygen Delivery Method): room air        LABS: Reviewed.                          12.5   11.64 )-----------( 219      ( 2025 06:10 )             39.3     04-09    134[L]  |  96  |  20[H]  ----------------------------<  79  3.0[L]   |  30  |  0.90    Ca    9.5      2025 06:10  Phos  3.0     04-  Mg     1.7     04-09          Urinalysis Basic - ( 2025 06:10 )    Color: x / Appearance: x / SG: x / pH: x  Gluc: 79 mg/dL / Ketone: x  / Bili: x / Urobili: x   Blood: x / Protein: x / Nitrite: x   Leuk Esterase: x / RBC: x / WBC x   Sq Epi: x / Non Sq Epi: x / Bacteria: x      CAPILLARY BLOOD GLUCOSE            Radiology: Reviewed.     ORT Score -   Family Hx of substance abuse	Female	      Male  Alcohol 	                                           1                     3  Illegal drugs	                                   2                     3  Rx drugs                                           4 	                  4  Personal Hx of substance abuse		  Alcohol 	                                          3	                  3  Illegal drugs                                     4	                  4  Rx drugs                                            5 	                  5  Age between 16- 45 years	           1                     1  hx preadolescent sexual abuse	   3 	                  0  Psychological disease		  ADD, OCD, bipolar, schizophrenia   2	          2  Depression                                           1 	          1  Total: 0    a score of 3 or lower indicates low risk for opioid abuse		  a score of 4-7 indicates moderate risk for opioid abuse		  a score of 8 or higher indicates high risk for opioid abuse  	  4AT (Assessment test for delirium & cognitive impairment)  _________________________________________________________  [1] ALERTNESS  This includes patients who may be markedly drowsy (eg. difficult to rouse and/or obviously sleepy  during assessment) or agitated/hyperactive. Observe the patient. If asleep, attempt to wake with  speech or gentle touch on shoulder. Ask the patient to state their name and address to assist rating.  Normal (fully alert, but not agitated, throughout assessment) 0  Mild sleepiness for <10 seconds after waking, then normal 0  Clearly abnormal 4    [2] AMT4  Age, date of birth, place (name of the hospital or building), current year.  No mistakes 0  1 mistake 1  2 or more mistakes/untestable 2    [3] ATTENTION  Ask the patient: “Please tell me the months of the year in backwards order, starting at December.”  To assist initial understanding one prompt of “what is the month before December?” is permitted.  Months of the year backwards Achieves 7 months or more correctly 0  Starts but scores <7 months / refuses to start 1   Untestable (cannot start because unwell, drowsy, inattentive) 2    [4] ACUTE CHANGE OR FLUCTUATING COURSE  Evidence of significant change or fluctuation in: alertness, cognition, other mental function  (eg. paranoia, hallucinations) arising over the last 2 weeks and still evident in last 24hrs  No 0  Yes 4    4 or above: possible delirium +/- cognitive impairment  1-3: possible cognitive impairment  0: delirium or severe cognitive impairment unlikely (but delirium still possible if [4] information incomplete)    4AT SCORE: 0    REVIEW OF SYSTEMS:  CONSTITUTIONAL: No fever or fatigue  HEENT:  No difficulty hearing, no change in vision  NECK: No pain or stiffness  RESPIRATORY: No cough, wheezing, chills or hemoptysis; No shortness of breath  CARDIOVASCULAR: No chest pain, palpitations, dizziness, or leg swelling  GASTROINTESTINAL: No loss of appetite, decreased PO intake. No abdominal or epigastric pain. No nausea, vomiting; No diarrhea or constipation.   GENITOURINARY: No dysuria, frequency, hematuria, retention or incontinence  MUSCULOSKELETAL: No joint pain or swelling; No muscle, back, or extremity pain, no upper or lower motor strength weakness, no saddle anesthesia, bowel/bladder incontinence, no falls   NEURO: No headaches, No numbness/tingling b/l LE, No weakness  ENDOCRINE: No polyuria, polydipsia, heat or cold intolerance; No hair loss  PSYCHIATRIC: No depression, anxiety or difficulty sleeping    PHYSICAL EXAM:  GENERAL:  Alert & Oriented X4, cooperative, NAD, Good concentration. Speech is clear.   RESPIRATORY: Respirations even and unlabored. Clear to auscultation bilaterally; No rales, rhonchi, wheezing, or rubs  CARDIOVASCULAR: Normal S1/S2, regular rate and rhythm; No murmurs, rubs, or gallops. No JVD.   GASTROINTESTINAL:  Soft, Nontender, Nondistended; Bowel sounds present  PERIPHERAL VASCULAR:  Extremities warm without edema. 2+ Peripheral Pulses, No cyanosis, No calf tenderness  MUSCULOSKELETAL: Motor Strength 5/5 B/L upper and lower extremities; moves all extremities equally against gravity; ROM intact; negative SLR; No tenderness on palpation of all joints.   SKIN: Warm, dry, intact. No rashes, lesions, scars or wounds.     Risk factors associated with adverse outcomes related to opioid treatment  [ ]  Concurrent benzodiazepine use  [ ]  History/ Active substance use or alcohol use disorder  [ ] Psychiatric co-morbidity  [ ] Sleep apnea  [ ] COPD  [ ] BMI> 35  [ ] Liver dysfunction  [ ] Renal dysfunction  [ ] CHF  [ ] Smoker  [ ]  Age > 60 years    [ ]  [place]  Reviewed and Copied to Chart. See below.    Plan of care and goal oriented pain management treatment options were discussed with patient and /or primary care giver; all questions and concerns were addressed and care was aligned with patient's wishes.    Educated patient on goal oriented pain management treatment options        Source of information: [facility] SR, Chart review  Patient language: [place]i  : n/a    HPI:  89F, from home, ambulates with cane, PMH HTN, hypothyroidism, COPD, ?dementia. Presenting after fall at home ; was found by HHA on the floor. Unknown down time. At the time of exam, patient AAOx2 (to self, place). Does not appear a great historian. States that she had a fall. States she was attempting to stand up when she fell and landed on her side. Denies head trauma. However, not able to clarify if she had palpitations, dizziness, or other prodromal sx prior to the fall. Endorses mildly productive cough >1 week, however unable to quantify exactly how long. Denies fever, chills, chest pain, abd pain, SOB, dysuria (2025 16:01)    Pt is admitted for s/p mechanical fall. [facility]. On CT C/A/P: impaction fracture of Left Hip Femoral Neck Fracture noted. Ortho recommended MRI left hip to r/o acute fx. Ortho/Neurology following. Pain consulted for left hip pain.  Pt seen and examined at bedside this morning. Pt found sitting up in bed, awake, alert and oriented x 1-2, reoriented to time and place. Pt Hx of Dementia. Pt is [place]i speaking, no  needed at this time. Pt reports pain score 7/10, no facial grimacing noted. SCALE USED: (1-10 VNRS). Pt describes pain as aching on left hip, non radiating to and alleviated by pain medication and exacerbated by movement. Pt tolerating PO diet. Denies lethargy, nausea, vomiting, constipation, itchiness. Pt doesn't recall last BM. Patient stating not OOB yet due to pain, only bedrest. Pt doesn't recall meds at home, and baseline using cane. Per chart notes, Family opting for conservative mgmt.    PAST MEDICAL & SURGICAL HISTORY:  HTN (hypertension)    Hypothyroidism    Chronic obstructive pulmonary disease (COPD)    S/P  section    FAMILY HISTORY:    Social History:   [x] Denies ETOH use, illicit drug use and smoking    Allergies    No Known Allergies    Intolerances    MEDICATIONS  (STANDING):  amLODIPine   Tablet 5 milliGRAM(s) Oral daily  fluticasone propionate/ salmeterol 100-50 MICROgram(s) Diskus 1 Dose(s) Inhalation two times a day  levothyroxine 88 MICROGram(s) Oral daily  LORazepam   Injectable 0.5 milliGRAM(s) IV Push once  naloxone Injectable 0.4 milliGRAM(s) IV Push once  piperacillin/tazobactam IVPB.. 3.375 Gram(s) IV Intermittent every 8 hours  polyethylene glycol 3350 17 Gram(s) Oral daily  potassium chloride   Powder 40 milliEquivalent(s) Oral once  potassium chloride  10 mEq/100 mL IVPB 10 milliEquivalent(s) IV Intermittent once  senna 2 Tablet(s) Oral at bedtime    MEDICATIONS  (PRN):  acetaminophen     Tablet .. 650 milliGRAM(s) Oral every 6 hours PRN Temp greater or equal to 38C (100.4F), Mild Pain (1 - 3)  albuterol    90 MICROgram(s) HFA Inhaler 2 Puff(s) Inhalation every 6 hours PRN Shortness of Breath and/or Wheezing  bisacodyl 5 milliGRAM(s) Oral daily PRN Constipation  ondansetron Injectable 4 milliGRAM(s) IV Push every 8 hours PRN Nausea and/or Vomiting  oxyCODONE    IR 2.5 milliGRAM(s) Oral every 4 hours PRN Moderate Pain (4 - 6)  oxyCODONE    IR 5 milliGRAM(s) Oral every 4 hours PRN Severe Pain (7 - 10)    Vital Signs Last 24 Hrs  T(C): 36.3 (2025 05:34), Max: 36.8 (2025 14:30)  T(F): 97.3 (2025 05:34), Max: 98.3 (2025 14:30)  HR: 82 (2025 05:34) (72 - 82)  BP: 137/73 (2025 05:34) (134/76 - 139/78)  BP(mean): 96 (2025 05:34) (95 - 99)  RR: 18 (2025 05:34) (17 - 18)  SpO2: 93% (2025 05:34) (93% - 97%)    Parameters below as of 2025 05:34  Patient On (Oxygen Delivery Method): room air    LABS: Reviewed.                        12.5   1164 )-----------( 219      ( 2025 06:10 )             39.3     04-09    134[L]  |  96  |  20[H]  ----------------------------<  79  3.0[L]   |  30  |  0.90    Ca    9.5      2025 06:10  Phos  3.0     04-  Mg     1.7     04-09    Urinalysis Basic - ( 2025 06:10 )    Color: x / Appearance: x / SG: x / pH: x  Gluc: 79 mg/dL / Ketone: x  / Bili: x / Urobili: x   Blood: x / Protein: x / Nitrite: x   Leuk Esterase: x / RBC: x / WBC x   Sq Epi: x / Non Sq Epi: x / Bacteria: x    CAPILLARY BLOOD GLUCOSE    Radiology: Reviewed.   ACC: [id number] EXAM:  CT ABDOMEN AND PELVIS   ORDERED BY: DUSTY RODGERS     ACC: [id number] EXAM:  CT CHEST   ORDERED BY: DUSTY RODGERS     PROCEDURE DATE:  2025      INTERPRETATION:  CLINICAL INFORMATION: Ground-level fall.    COMPARISON: None.    CONTRAST/COMPLICATIONS:  IV Contrast: NONE  Oral Contrast: NONE    PROCEDURE:  CT of the Chest, Abdomen and Pelvis was performed.  Sagittal and coronal reformats were performed.    FINDINGS:  CHEST:  LUNGS AND LARGE AIRWAYS: Patent central airways. Multifocal cystic   bronchiectasis, worst in the lingula. Associated mucous plugging and   peribronchial opacities/nodularities.  PLEURA: No pleural effusion.  VESSELS: Atheromatous calcifications of the thoracic aorta.  HEART: Heart size is normal. No pericardial effusion.  MEDIASTINUM AND DAGO: No lymphadenopathy.  CHEST WALL AND LOWER NECK: Within normal limits.    ABDOMEN AND PELVIS:  LIVER: Within normal limits.  BILE DUCTS: Mild intra and extrahepatic biliary duct dilatation.  GALLBLADDER: Within normal limits.  SPLEEN: Within normal limits.  PANCREAS: Pancreatic parenchymal atrophy and mild duct dilatation.  ADRENALS: Within normal limits.  KIDNEYS/URETERS: Within normal limits.    BLADDER: Within normal limits.  REPRODUCTIVE ORGANS:Uterus and adnexa within normal limits.    BOWEL: No bowel obstruction. Appendix is normal.  PERITONEUM/RETROPERITONEUM: Within normal limits.  VESSELS: Within normal limits.  LYMPH NODES: No lymphadenopathy.  ABDOMINAL WALL: Within normal limits.  BONES: Osteopenia. Impaction fracture of left femoral neck.    IMPRESSION:  Impaction fracture of left femoral neck.    No CT evidence of visceral injury in the chest, abdomen and pelvis.    Bronchiectasis and peribronchial nodularity suspicious for ROWDY infection.    --- End of Report ---    TYRELL BACK MD; Attending Radiologist  This document has been electronically signed. 2025  2:22PM  ACC: [id number] EXAM:  XR FEMUR 2 VIEWS LT   ORDERED BY: DUSTY RODGERS     ACC: [id number] EXAM:  XR HIPS BI WITH PELV 3-4V   ORDERED BY: DUSTY RODGERS     PROCEDURE DATE:  2025      INTERPRETATION:  Bilateral hips and pelvis and left femur. Patient had a   fall.    Bilateral hips and pelvis. There is moderate left knee degeneration.    IMPRESSION: Subcapital fracture of the left hip. 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    --- End of Report ---    PABLITO ARNETT MD; Attending Radiologist  This document has been electronically signed. 2025 10:33AM    ORT Score -   unable to do, due to Hx of Dementia    REVIEW OF SYSTEMS:  unable to do, due to Hx of Dementia    PHYSICAL EXAM:  GENERAL:  Alert & Oriented X 1-2, reoriented to time and place, NAD, Speech is soft and clear.   RESPIRATORY: Respirations even and unlabored. Clear to auscultation bilaterally; No rales, rhonchi, wheezing, or rubs  CARDIOVASCULAR: Normal S1/S2, regular rate and rhythm; No murmurs, rubs, or gallops. No JVD.   GASTROINTESTINAL:  Soft, Nontender, Nondistended; Bowel sounds present  PERIPHERAL VASCULAR:  Extremities warm without edema. 2+ Peripheral Pulses, No cyanosis, No calf tenderness  MUSCULOSKELETAL: Motor Strength 5/5 B/L upper and lower extremities; except LLE, moves all extremities equally, ROM decreased to LLE, + tenderness on palpation of left hip  SKIN: Warm, dry, intact. No rashes, lesions, scars or wounds. +left hip ecchymotic. +Right hip stage II, Sacrum stage II, Right shoulder stage II.    Risk factors associated with adverse outcomes related to opioid treatment  [ ]  Concurrent benzodiazepine use  [ ]  History/ Active substance use or alcohol use disorder  [x] Psychiatric co-morbidity-Dementia  [ ] Sleep apnea  [x] COPD  [ ] BMI> 35  [ ] Liver dysfunction  [ ] Renal dysfunction  [ ] CHF  [ ] Smoker  [x]  Age > 60 years    [x]  [place]  Reviewed and Copied to Chart. See below.    Plan of care and goal oriented pain management treatment options were discussed with patient and /or primary care giver; all questions and concerns were addressed and care was aligned with patient's wishes.    Educated patient on goal oriented pain management treatment options

## 2025-04-09 NOTE — PROGRESS NOTE ADULT - SUBJECTIVE AND OBJECTIVE BOX
Patient is seen and examined at the bed side, is afebrile. No over night events.      REVIEW OF SYSTEMS: Unable to obtain due to mental status       ALLERGIES: No Known Allergies      Vital Signs Last 24 Hrs  T(C): 36.8 (09 Apr 2025 12:55), Max: 36.8 (08 Apr 2025 14:30)  T(F): 98.2 (09 Apr 2025 12:55), Max: 98.3 (08 Apr 2025 14:30)  HR: 82 (09 Apr 2025 12:55) (72 - 82)  BP: 122/75 (09 Apr 2025 12:55) (122/75 - 139/78)  BP(mean): 91 (09 Apr 2025 12:55) (91 - 99)  RR: 18 (09 Apr 2025 12:55) (17 - 18)  SpO2: 96% (09 Apr 2025 12:55) (93% - 97%)    Parameters below as of 09 Apr 2025 12:55  Patient On (Oxygen Delivery Method): room air      PHYSICAL EXAM:  GENERAL: Not in acute distress  CHEST/LUNG:  Air entry bilaterally  HEART: s1 and s2 present  ABDOMEN:  Nontender and  Nondistended  EXTREMITIES: No pedal  edema  CNS: Awake and  confused      LABS:                         12.5   11.64 )-----------( 219      ( 09 Apr 2025 06:10 )             39.3                           13.2   9.21  )-----------( 234      ( 07 Apr 2025 05:25 )             43.0       04-09    134[L]  |  96  |  20[H]  ----------------------------<  79  3.0[L]   |  30  |  0.90    Ca    9.5      09 Apr 2025 06:10  Phos  3.0     04-09  Mg     1.7     04-09      04-07    145  |  109[H]  |  36[H]  ----------------------------<  99  3.2[L]   |  29  |  0.97    Ca    9.7      07 Apr 2025 05:25  Phos  1.6     04-07  Mg     1.9     04-07    TPro  6.4  /  Alb  2.2[L]  /  TBili  0.4  /  DBili  x   /  AST  32  /  ALT  35  /  AlkPhos  111  04-07    PT/INR - ( 06 Apr 2025 11:45 )   PT: 10.9 sec;   INR: 0.93 ratio         PTT - ( 06 Apr 2025 11:45 )  PTT:31.6 sec      MEDICATIONS  (STANDING):    acetaminophen     Tablet .. 1000 milliGRAM(s) Oral every 8 hours  amLODIPine   Tablet 5 milliGRAM(s) Oral daily  fluticasone propionate/ salmeterol 100-50 MICROgram(s) Diskus 1 Dose(s) Inhalation two times a day  heparin   Injectable 5000 Unit(s) SubCutaneous every 8 hours  levothyroxine 88 MICROGram(s) Oral daily  lidocaine   4% Patch 3 Patch Transdermal daily  LORazepam   Injectable 0.5 milliGRAM(s) IV Push once  naloxone Injectable 0.4 milliGRAM(s) IV Push once  piperacillin/tazobactam IVPB.. 3.375 Gram(s) IV Intermittent every 8 hours  polyethylene glycol 3350 17 Gram(s) Oral daily  senna 2 Tablet(s) Oral at bedtime      RADIOLOGY & ADDITIONAL TESTS:    4/6/25: Xray Femur 2 Views, Left (04.06.25 @ 15:42) IMPRESSION: Subcapital fracture of the left hip. 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      4/6/25: CT Cervical Spine No Cont (04.06.25 @ 13:37) >  IMPRESSION:    CT HEAD : No traumatic intracranial abnormality.    Questionable small left frontal encephalocele extending into the left ethmoid sinus and slightly protruding into the left superomedial orbit.   Recommend MRI of the brain seizure protocol without and with contrast for further evaluation.    Bilateral sphenoid sinus disease.    CT CERVICAL SPINE:    No acute fracture or traumatic malalignment.  Multilevel degenerative changes.      MICROBIOLOGY DATA:    Culture - Blood (04.06.25 @ 11:55)   Specimen Source: Blood Blood  Culture Results: No growth at 72 Hours    Culture - Blood (04.06.25 @ 11:45)   Specimen Source: Blood Blood  Culture Results: No growth at 72 Hours      Urine Microscopic-Add On (NC) (04.06.25 @ 11:55)   White Blood Cell - Urine: 2 /HPF  Red Blood Cell - Urine: 2 /HPF  Bacteria: Moderate /HPF  Squamous Epithelial Cells: Present

## 2025-04-09 NOTE — DISCHARGE NOTE PROVIDER - CARE PROVIDER_API CALL
Morris Perez [facility]  Internal Medicine  [address] [place] [id number]  Phone: [phone]  Fax: [phone]  Follow Up Time: 1 week

## 2025-04-09 NOTE — PROGRESS NOTE ADULT - ASSESSMENT
Patient is a 89y old  Female who is from home, ambulates with cane, and PMH of HTN, hypothyroidism, COPD, ?dementia. now Presents to the ER for evaluation of A fall at home, was found by HHA on the floor. Unknown down time. In ER, patient AAOx2 (to self, place). States that she had a fall while attempting to stand up, fell and landed on her side. Denies head trauma. On admission, she found to have no fever but Subcapital fracture of the left hip and Bilateral sphenoid sinus disease.  The CT chest shows Bronchiectasis, hence the ID consult requested to assist with further evaluation and rule out ROWDY.    # Bilateral sphenoid sinusitis  # Bronchiectasis, R/O ROWDY - unable to  produce Sputum even with " sputum Induction" by Respiratory team  # Subcapital fracture of the left hip after  A fall    would recommend:    1. Continue Zosyn to treat sinusitis  2. Sputum for AFB stain and culture X 3 if possible  3.  Management of  Subcapital fracture of the left hip as per Ortho team    d/w DR. Perez and Nursing staff    Attending Attestation:    Spent more than 35 minutes on total encounter, more than 50 % of the visit was spent counseling and/or coordinating care by the Attending physician.

## 2025-04-09 NOTE — CONSULT NOTE ADULT - ASSESSMENT
Confidential Drug Utilization Report  Search Terms: Christiana Romeo, 1935Search Date: 04/09/2025 16:13:42 PM  The Drug Utilization Report below displays all of the controlled substance prescriptions, if any, that your patient has filled in the last twelve months. The information displayed on this report is compiled from pharmacy submissions to the Department, and accurately reflects the information as submitted by the pharmacies.    This report was requested by: Vika Denson | Reference #: [id number]    There are no results for the search terms that you entered.

## 2025-04-09 NOTE — PROGRESS NOTE ADULT - PROBLEM SELECTOR PLAN 1
CT noted above  F/u MRI L hip  Family opting for conservative mgmt  Pain control  PWB  PT consult   Ortho following  PT consulted  Recommendations appreciated

## 2025-04-09 NOTE — DISCHARGE NOTE PROVIDER - HOSPITAL COURSE
89y old  Female who is from home, ambulates with cane, and PMH of HTN, hypothyroidism, COPD, ?dementia. now Presents to the ER for evaluation of a fall at home.    #Xray Hip w/ Pelvis 3-4 Views IMPRESSION:  Subcapital fracture of the left hip. Moderate left knee degeneration moderate bilateral hip degeneration.    #CT IMPRESSION:  CT HEAD:  No traumatic intracranial abnormality.  Questionable small left frontal encephalocele extending into the left ethmoid sinus and slightly protruding into the left superomedial orbit.   Recommend MRI of the brain seizure protocol without and with contrast for further evaluation.  Bilateral sphenoid sinus disease, as above.    CT CERVICAL SPINE:  No acute fracture or traumatic malalignment.  Multilevel degenerative changes.    #CT Chest No Cont IMPRESSION:  Impaction fracture of left femoral neck.  No CT evidence of visceral injury in the chest, abdomen and pelvis.  Bronchiectasis and peribronchial nodularity suspicious for ROWDY infection.    #MRI Brain ...............    #MRI Hip .....................    Family opted to forgo surgery. Ortho recs conservative mgmt with PWB.    Neuro recs ........................    PT recs ......................    NOT COMPLETE 89 y/o F, Nigerien speaking from home, ambulates with cane, PMH HTN, hypothyroidism, COPD, dementia. Presenting after fall at home ; was found by Niece on the floor.    CT HEAD: Small LEFT frontal encephalocele.   CT C/A/P: impaction fracture of LEFT femoral neck. Bronchiectasis and peribronchial nodularity suspicious for ROWDY infection.   SCr 1.53. Trop 109, proBNP 3k.   Admitted for mechanical fall, bronchiectasis exacerbation, ROWDY workup, and MONO..     Troponin downtrended to 90, Cardiology Dr. Richmond following, echo with EF 51%, mild grade 1 diastolic dysfunction.   Mono resolved.   Ortho following rec MRI left hip - showing Acute nondisplaced fracture of the left femoral head/left   subcapital femoral neck.   Orthopedics and Niece both agree for conservative management.   Neuro Dr. Velez following, MRI brain w/wo IV contrast to further eval incidental finding   encephalocele - shows stability, no signs of infection, no further imaging needed.   ID Dr. Romo consulted for concern of ROWDY infection, unable to collect sputum cultures, continue Zosyn while inpatient and change to Augmentin 875 mg q 12hours on discharge until 4/19/25.   Awaiting PT eval. Dispo planning for [facility].    89 y/o F, Trinidadian speaking from home, ambulates with cane, PMH HTN, hypothyroidism, COPD, dementia. Presenting after fall at home ; was found by Niece on the floor.    CT HEAD: Small LEFT frontal encephalocele.   CT C/A/P: impaction fracture of LEFT femoral neck. Bronchiectasis and peribronchial nodularity suspicious for ROWDY infection.   SCr 1.53. Trop 109, proBNP 3k.   Admitted for mechanical fall, bronchiectasis exacerbation, ROWDY workup, and MONO..     Troponin downtrended to 90, Cardiology Dr. Richmond following, echo with EF 51%, mild grade 1 diastolic dysfunction.   Mono resolved.   Ortho following rec MRI left hip - showing Acute nondisplaced fracture of the left femoral head/left   subcapital femoral neck.   Orthopedics and Niece both agree for conservative management.   Neuro Dr. Velez following, MRI brain w/wo IV contrast to further eval incidental finding   encephalocele - shows stability, no signs of infection, no further imaging needed.   ID Dr. Romo consulted for concern of ROWDY infection, unable to collect sputum cultures, continue Zosyn while inpatient and change to Augmentin 875 mg q 12hours on discharge until 4/19/25.     Awaiting PT eval. Dispo planning for [facility].     Case discussed with attending.  Given patient's improved clinical status and current hemodynamic stability, the decision was made for discharge.

## 2025-04-09 NOTE — PROGRESS NOTE ADULT - SUBJECTIVE AND OBJECTIVE BOX
Patient is a 89y old  Female who presents with a chief complaint of fall (09 Apr 2025 07:21)      pt seen in icu [  ], reg med floor [ x  ], bed [x  ], chair at bedside [   ], a+o x3 [x  ], lethargic [  ],  nad [ x ]      Allergies    No Known Allergies        Vitals    T(F): 97.3 (04-09-25 @ 05:34), Max: 98.3 (04-08-25 @ 14:30)  HR: 82 (04-09-25 @ 05:34) (72 - 82)  BP: 137/73 (04-09-25 @ 05:34) (134/76 - 139/78)  RR: 18 (04-09-25 @ 05:34) (17 - 18)  SpO2: 93% (04-09-25 @ 05:34) (93% - 97%)  Wt(kg): --  CAPILLARY BLOOD GLUCOSE          Labs                          12.5   11.64 )-----------( 219      ( 09 Apr 2025 06:10 )             39.3       04-09    134[L]  |  96  |  20[H]  ----------------------------<  79  3.0[L]   |  30  |  0.90    Ca    9.5      09 Apr 2025 06:10  Phos  3.0     04-09  Mg     1.7     04-09            Troponin I, High Sensitivity Result: 90.0 ng/L (04-07-25 @ 05:25)  Troponin I, High Sensitivity Result: 97.8 ng/L (04-07-25 @ 00:10)  Troponin I, High Sensitivity Result: 109.9 ng/L (04-06-25 @ 11:45)    Blood Blood  04-06 @ 11:55   No growth at 48 Hours  --  --      Blood Blood  04-06 @ 11:45   No growth at 48 Hours  --  --          Radiology Results      Meds    MEDICATIONS  (STANDING):  amLODIPine   Tablet 5 milliGRAM(s) Oral daily  fluticasone propionate/ salmeterol 100-50 MICROgram(s) Diskus 1 Dose(s) Inhalation two times a day  levothyroxine 88 MICROGram(s) Oral daily  LORazepam   Injectable 0.5 milliGRAM(s) IV Push once  naloxone Injectable 0.4 milliGRAM(s) IV Push once  piperacillin/tazobactam IVPB.. 3.375 Gram(s) IV Intermittent every 8 hours  polyethylene glycol 3350 17 Gram(s) Oral daily  senna 2 Tablet(s) Oral at bedtime      MEDICATIONS  (PRN):  acetaminophen     Tablet .. 650 milliGRAM(s) Oral every 6 hours PRN Temp greater or equal to 38C (100.4F), Mild Pain (1 - 3)  albuterol    90 MICROgram(s) HFA Inhaler 2 Puff(s) Inhalation every 6 hours PRN Shortness of Breath and/or Wheezing  bisacodyl 5 milliGRAM(s) Oral daily PRN Constipation  ondansetron Injectable 4 milliGRAM(s) IV Push every 8 hours PRN Nausea and/or Vomiting  oxyCODONE    IR 2.5 milliGRAM(s) Oral every 4 hours PRN Moderate Pain (4 - 6)  oxyCODONE    IR 5 milliGRAM(s) Oral every 4 hours PRN Severe Pain (7 - 10)      Physical Exam    Neuro :  no focal deficits  Respiratory: CTA B/L  CV: RRR, S1S2, no murmurs,   Abdominal: Soft, NT, ND +BS,  Extremities: No edema, + peripheral pulses    ASSESSMENT    left femoral neck fx,   bronchiectasis with karen,   licha,    demand ischemia   left frontal encephalocele,   h/o HTN,   hypothyroidism,   COPD,   ?dementia       PLAN    cont pain control,   ortho f/u    Conservative management ,   no surgical orthopedic intervention is recommended at this time PENDING MRI ,   if MRI shows chronic or subacute fracture to left hip will remain conservative     >  If MRI of hip shows acute fracture will have another discussion with HCP   and reconsider surgical intervention  -  Pending MRI left hip for chronicity determination of left hip Fx  -  DVT prophylaxis with heparin and Venodynes  -  Daily PT: PWB to LLE at this time, OOB with assistive device  id f/u   Sputum for AFB stain and culture X 3 if possible  Changed Abx to Zosyn to treat sinusitis  pulm f/u    Bronchodilators  Budesonide 0.25 mgs one unit  dose neb BID  Montelukast 10 mgs po Qhs.  serum creat wnl   supplement potassium    phos wnl  trop trending down   cardio f/u   cont norvasc   f/u echo   neuro f/u  - await MRI Brain W/WO Constantino to R/O infectious Vs inflammatory etiologies given   L frontal encephelocele extending into sinus spaces(pt may not tolerate the MRI).   tsh elevated 20.5  ft4 1.1   endo cons   increased lt4 to 88 mcg  tfts in 4-6 wks  cont current meds

## 2025-04-09 NOTE — PROGRESS NOTE ADULT - SUBJECTIVE AND OBJECTIVE BOX
Patient is a 89y old  Female who presents with a chief complaint of fall (09 Apr 2025 09:49)  Awake, alert, lying in bed in NAD. Doing well on RA. Has pain on left hip    INTERVAL HPI/OVERNIGHT EVENTS:      VITAL SIGNS:  T(F): 97.3 (04-09-25 @ 05:34)  HR: 82 (04-09-25 @ 05:34)  BP: 137/73 (04-09-25 @ 05:34)  RR: 18 (04-09-25 @ 05:34)  SpO2: 93% (04-09-25 @ 05:34)  Wt(kg): --  I&O's Detail    08 Apr 2025 07:01  -  09 Apr 2025 07:00  --------------------------------------------------------  IN:  Total IN: 0 mL    OUT:    Voided (mL): 650 mL  Total OUT: 650 mL    Total NET: -650 mL              REVIEW OF SYSTEMS:    CONSTITUTIONAL:  No fevers, chills, sweats    HEENT:  Eyes:  No diplopia or blurred vision. ENT:  No earache, sore throat or runny nose.    CARDIOVASCULAR:  No pressure, squeezing, tightness, or heaviness about the chest; no palpitations.    RESPIRATORY:  Per HPI    GASTROINTESTINAL:  No abdominal pain, nausea, vomiting or diarrhea.    GENITOURINARY:  No dysuria, frequency or urgency.    NEUROLOGIC:  No paresthesias, fasciculations, seizures or weakness.    PSYCHIATRIC:  No disorder of thought or mood.      PHYSICAL EXAM:    Constitutional: Well developed and nourished  Eyes:Perrla  ENMT: normal  Neck:supple  Respiratory: Rales anteriorly  Cardiovascular: S1 S2 regular  Gastrointestinal: Soft, Non tender  Extremities: No edema  Vascular:normal  Neurological:Awake, alert,Ox3  Musculoskeletal:Normal      MEDICATIONS  (STANDING):  amLODIPine   Tablet 5 milliGRAM(s) Oral daily  fluticasone propionate/ salmeterol 100-50 MICROgram(s) Diskus 1 Dose(s) Inhalation two times a day  heparin   Injectable 5000 Unit(s) SubCutaneous every 8 hours  levothyroxine 88 MICROGram(s) Oral daily  LORazepam   Injectable 0.5 milliGRAM(s) IV Push once  naloxone Injectable 0.4 milliGRAM(s) IV Push once  piperacillin/tazobactam IVPB.. 3.375 Gram(s) IV Intermittent every 8 hours  polyethylene glycol 3350 17 Gram(s) Oral daily  senna 2 Tablet(s) Oral at bedtime    MEDICATIONS  (PRN):  acetaminophen     Tablet .. 650 milliGRAM(s) Oral every 6 hours PRN Temp greater or equal to 38C (100.4F), Mild Pain (1 - 3)  albuterol    90 MICROgram(s) HFA Inhaler 2 Puff(s) Inhalation every 6 hours PRN Shortness of Breath and/or Wheezing  bisacodyl 5 milliGRAM(s) Oral daily PRN Constipation  ondansetron Injectable 4 milliGRAM(s) IV Push every 8 hours PRN Nausea and/or Vomiting  oxyCODONE    IR 2.5 milliGRAM(s) Oral every 4 hours PRN Moderate Pain (4 - 6)  oxyCODONE    IR 5 milliGRAM(s) Oral every 4 hours PRN Severe Pain (7 - 10)      Allergies    No Known Allergies    Intolerances        LABS:                        12.5   11.64 )-----------( 219      ( 09 Apr 2025 06:10 )             39.3     04-09    134[L]  |  96  |  20[H]  ----------------------------<  79  3.0[L]   |  30  |  0.90    Ca    9.5      09 Apr 2025 06:10  Phos  3.0     04-09  Mg     1.7     04-09        Urinalysis Basic - ( 09 Apr 2025 06:10 )    Color: x / Appearance: x / SG: x / pH: x  Gluc: 79 mg/dL / Ketone: x  / Bili: x / Urobili: x   Blood: x / Protein: x / Nitrite: x   Leuk Esterase: x / RBC: x / WBC x   Sq Epi: x / Non Sq Epi: x / Bacteria: x            CAPILLARY BLOOD GLUCOSE            RADIOLOGY & ADDITIONAL TESTS:    CXR:    Ct scan chest:    ekg;    echo:

## 2025-04-09 NOTE — PROGRESS NOTE ADULT - ASSESSMENT
89F, from home, ambulates with cane, PMH HTN, hypothyroidism, COPD, ?dementia. Presenting after fall at home,Lt hip fx,pneumonia.  1.Echocardiogram.  2.Await MR-Lt hip.  3.HTN-norvasc.  4.Hypothyroid-synthroid, inc dose 100mcg qd.  5.COPD-management as per Pulm.  6.Pneumonia-abx.  7.Replace k+.  8.GI and DVT prophylaxis.   89F, from home, ambulates with cane, PMH HTN, hypothyroidism, COPD, ?dementia. Presenting after fall at home,Lt hip fx,pneumonia.  1.Echocardiogram.  2.Await MR-Lt hip.  3.HTN-norvasc.  4.Hypothyroid-synthroid.  5.COPD-management as per Pulm.  6.Pneumonia-abx.  7.Replace k+.  8.GI and DVT prophylaxis.

## 2025-04-09 NOTE — PHARMACOTHERAPY INTERVENTION NOTE - COMMENTS
Recommended to discontinue ceftriaxone since patient is covered with piperacillin-tazobactam as per ID.      Juany Burks PharmD   Clinical Pharmacy Specialist, Infectious Diseases  Tele-Antimicrobial Stewardship Program (Tele-[facility])  Tele-ASP Phone: [phone]   Recommended to discontinue ceftriaxone since patient is covered with piperacillin-tazobactam for Bilateral sphenoid sinus disease as per ID.      Juany Burks, PharmD   Clinical Pharmacy Specialist, Infectious Diseases  Tele-Antimicrobial Stewardship Program (Tele-[facility])  Tele-[facility] Phone: [phone]

## 2025-04-09 NOTE — PROGRESS NOTE ADULT - PROBLEM SELECTOR PLAN 9
Takes synthroid at home  TSH 20.50  Free T3 1.10  Free T4 1.1  Increase synthroid dose to 88 mcg   Dr. Zaman, Endocrine, following  Recommendations appreciated

## 2025-04-09 NOTE — PHARMACOTHERAPY INTERVENTION NOTE - COMMENTS
Recommended to initiate empiric piperacillin-tazobactam 3.375 g 8h for sinusitis as per ID.      Charlene AcD   Clinical Pharmacy Specialist, Infectious Diseases  Tele-Antimicrobial Stewardship Program (Tele-[facility])  Tele-ASP Phone: [phone]

## 2025-04-09 NOTE — PROGRESS NOTE ADULT - PROBLEM SELECTOR PLAN 1
pain control  Ortho follow up  DVT PPX pain control  Ortho follow up  MRI of left hip for determination of Fx chronicity  DVT PPX

## 2025-04-09 NOTE — DISCHARGE NOTE PROVIDER - NSDCMRMEDTOKEN_GEN_ALL_CORE_FT
amLODIPine 5 mg oral tablet: 1 tab(s) orally once a day  atenolol 25 mg oral tablet: 1 tab(s) orally once a day  budesonide-formoterol 160 mcg-4.5 mcg/inh inhalation aerosol: 2 puff(s) inhaled 2 times a day   levothyroxine 75 mcg (0.075 mg) oral tablet: 1 tab(s) orally once a day  losartan 100 mg oral tablet: 1 tab(s) orally once a day   acetaminophen 500 mg oral tablet: 2 tab(s) orally every 8 hours  Advair Diskus 100 mcg-50 mcg/inh inhalation powder: 1 inhaler(s) inhaled 2 times a day  albuterol 90 mcg/inh inhalation aerosol: 2 puff(s) inhaled every 6 hours As needed Shortness of Breath and/or Wheezing  amLODIPine 5 mg oral tablet: 1 tab(s) orally once a day  Augmentin 875 mg-125 mg oral tablet: 1 tab(s) orally every 12 hours Last dose to be given on 4/19  bisacodyl 5 mg oral delayed release tablet: 1 tab(s) orally once a day As needed Constipation  lidocaine 4% topical film: Apply topically to affected area once a day  montelukast 10 mg oral tablet: 1 tab(s) orally once a day (at bedtime)  oxyCODONE 5 mg oral tablet: 1 tab(s) orally every 4 hours As needed Severe Pain (7 - 10)  polyethylene glycol 3350 oral powder for reconstitution: 17 gram(s) orally once a day  senna leaf extract oral tablet: 2 tab(s) orally once a day (at bedtime)  Synthroid 88 mcg (0.088 mg) oral tablet: 1 tab(s) orally once a day

## 2025-04-09 NOTE — PROGRESS NOTE ADULT - SUBJECTIVE AND OBJECTIVE BOX
HPI:  89F, from home, ambulates with cane, PMH HTN, hypothyroidism, COPD, ?dementia. Presenting after fall at home ; was found by HHA on the floor. Unknown down time. At the time of exam, patient AAOx2 (to self, place). Does not appear a great historian. States that she had a fall. States she was attempting to stand up when she fell and landed on her side. Denies head trauma. However, not able to clarify if she had palpitations, dizziness, or other prodromal sx prior to the fall. Endorses mildly productive cough >1 week, however unable to quantify exactly how long. Denies fever, chills, chest pain, abd pain, SOB, dysuria.      (06 Apr 2025 16:01)      OVERNIGHT EVENTS:    No new overnight events.  Seen and examined at bedside.     REVIEW OF SYSTEMS:      EYES: no acute visual disturbances  NECK: No pain or stiffness  RESPIRATORY: No cough; No shortness of breath  CARDIOVASCULAR: No chest pain, no palpitations  GASTROINTESTINAL: No pain. No nausea, vomiting or diarrhea   NEUROLOGICAL: No headache or numbness, no tremors  MUSCULOSKELETAL: No joint pain, no muscle pain  GENITOURINARY: no dysuria, no frequency, no hesitancy  PSYCHIATRY: no depression, no anxiety  ALL OTHER  ROS negative        Vital Signs Last 24 Hrs  T(C): 36.3 (09 Apr 2025 05:34), Max: 36.8 (08 Apr 2025 14:30)  T(F): 97.3 (09 Apr 2025 05:34), Max: 98.3 (08 Apr 2025 14:30)  HR: 82 (09 Apr 2025 05:34) (72 - 82)  BP: 137/73 (09 Apr 2025 05:34) (134/76 - 139/78)  BP(mean): 96 (09 Apr 2025 05:34) (95 - 99)  RR: 18 (09 Apr 2025 05:34) (17 - 18)  SpO2: 93% (09 Apr 2025 05:34) (93% - 97%)    Parameters below as of 09 Apr 2025 05:34  Patient On (Oxygen Delivery Method): room air        ________________________________________________  PHYSICAL EXAM:    GENERAL: NAD  HEENT: Normocephalic; conjunctivae and sclerae clear;  NECK : supple, no JVD  CHEST/LUNG: Clear to auscultation; Nonlabored  HEART: S1 S2  regular  ABDOMEN: Soft, Nontender, Nondistended; Bowel sounds present  EXTREMITIES: no cyanosis; no LE edema; no calf tenderness  NERVOUS SYSTEM:  Alert; no new deficits  SKIN: warm and dry; No new rashes or lesions    _________________________________________________  CURRENT MEDICATIONS:    MEDICATIONS  (STANDING):  acetaminophen     Tablet .. 1000 milliGRAM(s) Oral every 8 hours  amLODIPine   Tablet 5 milliGRAM(s) Oral daily  fluticasone propionate/ salmeterol 100-50 MICROgram(s) Diskus 1 Dose(s) Inhalation two times a day  heparin   Injectable 5000 Unit(s) SubCutaneous every 8 hours  levothyroxine 88 MICROGram(s) Oral daily  lidocaine   4% Patch 3 Patch Transdermal daily  LORazepam   Injectable 0.5 milliGRAM(s) IV Push once  naloxone Injectable 0.4 milliGRAM(s) IV Push once  piperacillin/tazobactam IVPB.. 3.375 Gram(s) IV Intermittent every 8 hours  polyethylene glycol 3350 17 Gram(s) Oral daily  senna 2 Tablet(s) Oral at bedtime    MEDICATIONS  (PRN):  acetaminophen     Tablet .. 650 milliGRAM(s) Oral every 6 hours PRN Temp greater or equal to 38C (100.4F), Mild Pain (1 - 3)  albuterol    90 MICROgram(s) HFA Inhaler 2 Puff(s) Inhalation every 6 hours PRN Shortness of Breath and/or Wheezing  bisacodyl 5 milliGRAM(s) Oral daily PRN Constipation  ondansetron Injectable 4 milliGRAM(s) IV Push every 8 hours PRN Nausea and/or Vomiting  oxyCODONE    IR 2.5 milliGRAM(s) Oral every 4 hours PRN Moderate Pain (4 - 6)  oxyCODONE    IR 5 milliGRAM(s) Oral every 4 hours PRN Severe Pain (7 - 10)      __________________________________________________  LABS:                          12.5   11.64 )-----------( 219      ( 09 Apr 2025 06:10 )             39.3     04-09    134[L]  |  96  |  20[H]  ----------------------------<  79  3.0[L]   |  30  |  0.90    Ca    9.5      09 Apr 2025 06:10  Phos  3.0     04-09  Mg     1.7     04-09        Urinalysis Basic - ( 09 Apr 2025 06:10 )    Color: x / Appearance: x / SG: x / pH: x  Gluc: 79 mg/dL / Ketone: x  / Bili: x / Urobili: x   Blood: x / Protein: x / Nitrite: x   Leuk Esterase: x / RBC: x / WBC x   Sq Epi: x / Non Sq Epi: x / Bacteria: x      CAPILLARY BLOOD GLUCOSE          __________________________________________________  RADIOLOGY & ADDITIONAL TESTS:    Imaging Personally Reviewed:  YES    < from: Xray Hip w/ Pelvis 3-4 Views, Bilateral (04.06.25 @ 15:41) >  IMPRESSION: Subcapital fracture of the left hip. 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    < end of copied text >    < from: Xray Femur 2 Views, Left (04.06.25 @ 15:42) >  IMPRESSION: Subcapital fracture of the left hip. 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    < end of copied text >    < from: CT Cervical Spine No Cont (04.06.25 @ 13:37) >  IMPRESSION:    CT HEAD :    No traumatic intracranial abnormality.    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   Recommend MRI of the brain seizure protocol without and with contrast for   further evaluation.    Bilateral sphenoid sinus disease, as above.    CT CERVICAL SPINE:    No acute fracture or traumatic malalignment.    Multilevel degenerative changes.    < end of copied text >    < from: CT Chest No Cont (04.06.25 @ 13:36) >  IMPRESSION:  Impaction fracture of left femoral neck.    No CT evidence of visceral injury in the chest, abdomen and pelvis.    Bronchiectasis and peribronchial nodularity suspicious for ROWDY infection.    < end of copied text >    Consultant(s) Notes Reviewed:   YES     Plan of care was discussed with patient and /or primary care giver; all questions and concerns were addressed and care was aligned with patient's wishes.    Plan discussed with attending and consulting physicians.

## 2025-04-09 NOTE — DISCHARGE NOTE PROVIDER - NSDCCPCAREPLAN_GEN_ALL_CORE_FT
PRINCIPAL DISCHARGE DIAGNOSIS  Diagnosis: Femoral neck fracture  Assessment and Plan of Treatment: You came to the hospital after a fall.  You were found to have a fracture.  Your family opted to forgo surgery to repain your fracture.  You were evaluated by PT and recommended for ......................................      SECONDARY DISCHARGE DIAGNOSES  Diagnosis: FLAVIO (acute kidney injury)  Assessment and Plan of Treatment: Acute kidney injury (FLAVIO) is when the kidneys suddenly stop working effectively.  This can happen when there is less blood flow to the kidneys, there is kidney damage or the path for urine to leave the body is blocked.  FLAVIO can cause decreased urination, blood in the urine, swelling, vomiting, weakness, confusion and/or seisures.  The treatment depends on the cause and severity of the injury.  In any case, while your kidneys are healing you should avoid agents that can cause kidney injury.  Some of these agents include NSAIDs (ex. aspirin, celebrex, ibuprophen, and naproxen), Gadolinium contrast, and enemas containing phosphate.     PRINCIPAL DISCHARGE DIAGNOSIS  Diagnosis: Fracture of femoral neck, left  Assessment and Plan of Treatment: you came into the hospital due to a recent fall   you had a MRI of your left hip that showed a Acute nondisplaced fracture of the left femoral head/left subcapital femoral neck.   It was discussed with your Niece that you do not need Surgery at this time due to high risk   continue Partial weight bearing on the left leg  continue pain control   continue physical therapy daily at rehab, get out of bed to chair daily   follow up with Orthopedics Dr Moreno in 1-2 weeks.      SECONDARY DISCHARGE DIAGNOSES  Diagnosis: Bronchiectasis  Assessment and Plan of Treatment: you were found to have bronchiectasis, you had a CT chest that also showed concern for mycobacterium avium infection and you were treated with antibiotics.   you were given a course of IV antibiotics - Zosyn  you are recommended to continue augmentin 875 mg twice a day until 4/19/25.    Diagnosis: Frontal encephalocele  Assessment and Plan of Treatment: you were found to have a stable encephalocele  you were seen by a a Neurologist and did not need at treatment    Diagnosis: Demand ischemia  Assessment and Plan of Treatment: You were found to have mildly elevated troponins  You then had an EKG and an Echocardiogram that were normal.   You were seen by a Cardiologist Dr. Richmond    Diagnosis: FLAVIO (acute kidney injury)  Assessment and Plan of Treatment:     Diagnosis: Electrolyte imbalance  Assessment and Plan of Treatment:     Diagnosis: HTN (hypertension)  Assessment and Plan of Treatment: continue taking amlodipine 5 mg daily  restart taking atenolol and losartan if your blood pressure becomes high systolic >150    Diagnosis: Hypothyroidism  Assessment and Plan of Treatment: you were found to have High TSH levels, your levothyroxine was increased to 88 mcg daily  continue to take synthroid daily every morning before 30-60 minutes before food

## 2025-04-09 NOTE — CONSULT NOTE ADULT - CONSULT REQUESTED DATE/TIME
06-Apr-2025 15:39
07-Apr-2025 12:28
07-Apr-2025 14:18
07-Apr-2025 14:45
08-Apr-2025 10:31
09-Apr-2025 10:50
07-Apr-2025 10:10

## 2025-04-09 NOTE — CONSULT NOTE ADULT - PROBLEM SELECTOR RECOMMENDATION 9
Pain control  Ortho eval  DVT PPX
uncontrolled- ? compliance   tsh- 20  ft4-1.1  rec inc lt4 to 88 mcg  tfts in 4-6 wks  d/w pts family
Pt with acute left hip pain which is somatic and neuropathic in nature due to hip fracture. Pt is admitted for s/p mechanical fall. GOHCO. On CT C/A/P: impaction fracture of Left Hip Femoral Neck Fracture noted. Ortho recommended MRI left hip to r/o acute fx. Ortho/Neurology following. High risk medications reviewed. Avoid polypharmacy. Avoid IV opioids. Avoid NSAIDs and benzodiazepines. Non-pharmacological sleep aides initiated. Non-opioid medications and non-pharmacological pain management measures initiated. Per chart notes, Family opting for conservative mgmt.    Opioid pain recommendations   - Continue Oxycodone 2.5/5 mg PO q4h PRN moderate/severe pain. Monitor for sedation/ respiratory depression.   Non-opioid pain recommendations   - Start Acetaminophen 1 gram PO q 8 hours x 4 days. Monitor LFT's  - Start Lidocaine 4% patches x 3 daily.  Bowel Regimen  - Continue Miralax 17G PO daily  - Continue Senna 2 tablets at bedtime for constipation  Mild pain   - Non-pharmacological pain treatment recommendations  - Warm/ Cool packs PRN   - Repositioning extremity, guided imagery, relaxation, distraction.  - Physical therapy OOB if no contraindications   Recommendations discussed with primary team and RN.

## 2025-04-09 NOTE — PHARMACOTHERAPY INTERVENTION NOTE - COMMENTS
Recommended to discontinue azithromycin since patient is covered with piperacillin-tazobactam for Bilateral sphenoid sinus disease as per ID.      Juany Burks, PharmD   Clinical Pharmacy Specialist, Infectious Diseases  Tele-Antimicrobial Stewardship Program (Tele-ASP)  Tele-ASP Phone: [phone]

## 2025-04-09 NOTE — PROGRESS NOTE ADULT - SUBJECTIVE AND OBJECTIVE BOX
Date of Service 04-09-25 @ 09:50    CHIEF COMPLAINT:Patient is a 89y old  Female who presents with a chief complaint of fall.Pt appears comfortable.    	  REVIEW OF SYSTEMS:  CONSTITUTIONAL: No fever, weight loss, or fatigue  EYES: No eye pain, visual disturbances, or discharge  ENT:  No difficulty hearing, tinnitus, vertigo; No sinus or throat pain  NECK: No pain or stiffness  RESPIRATORY: No cough, wheezing, chills or hemoptysis; No Shortness of Breath  CARDIOVASCULAR: No chest pain, palpitations, passing out, dizziness, or leg swelling  GASTROINTESTINAL: No abdominal or epigastric pain. No nausea, vomiting, or hematemesis; No diarrhea or constipation. No melena or hematochezia.  GENITOURINARY: No dysuria, frequency, hematuria, or incontinence  NEUROLOGICAL: No headaches, memory loss, loss of strength, numbness, or tremors  SKIN: No itching, burning, rashes, or lesions   LYMPH Nodes: No enlarged glands  ENDOCRINE: No heat or cold intolerance; No hair loss  MUSCULOSKELETAL: No joint pain or swelling; No muscle, back, or extremity pain  PSYCHIATRIC: No depression, anxiety, mood swings, or difficulty sleeping  HEME/LYMPH: No easy bruising, or bleeding gums  ALLERGY AND IMMUNOLOGIC: No hives or eczema	      PHYSICAL EXAM:  T(C): 36.3 (04-09-25 @ 05:34), Max: 36.8 (04-08-25 @ 14:30)  HR: 82 (04-09-25 @ 05:34) (72 - 82)  BP: 137/73 (04-09-25 @ 05:34) (134/76 - 139/78)  RR: 18 (04-09-25 @ 05:34) (17 - 18)  SpO2: 93% (04-09-25 @ 05:34) (93% - 97%)  Wt(kg): --  I&O's Summary    08 Apr 2025 07:01  -  09 Apr 2025 07:00  --------------------------------------------------------  IN: 0 mL / OUT: 650 mL / NET: -650 mL        Appearance: Normal	  HEENT:   Normal oral mucosa, PERRL, EOMI	  Lymphatic: No lymphadenopathy  Cardiovascular: Normal S1 S2, No JVD, No murmurs, No edema  Respiratory: Lungs clear to auscultation	  Psychiatry: A & O x 3, Mood & affect appropriate  Gastrointestinal:  Soft, Non-tender, + BS	  Skin: No rashes, No ecchymoses, No cyanosis	  Neurologic: Non-focal  Extremities: Normal range of motion, No clubbing, cyanosis or edema  Vascular: Peripheral pulses palpable 2+ bilaterally    MEDICATIONS  (STANDING):  amLODIPine   Tablet 5 milliGRAM(s) Oral daily  fluticasone propionate/ salmeterol 100-50 MICROgram(s) Diskus 1 Dose(s) Inhalation two times a day  levothyroxine 88 MICROGram(s) Oral daily  LORazepam   Injectable 0.5 milliGRAM(s) IV Push once  naloxone Injectable 0.4 milliGRAM(s) IV Push once  piperacillin/tazobactam IVPB.. 3.375 Gram(s) IV Intermittent every 8 hours  polyethylene glycol 3350 17 Gram(s) Oral daily  senna 2 Tablet(s) Oral at bedtime      	  	  LABS:	 	    Troponin I, High Sensitivity Result: 90.0 ng/L (04-07 @ 05:25)  Troponin I, High Sensitivity Result: 97.8 ng/L (04-07 @ 00:10)  Troponin I, High Sensitivity Result: 109.9 ng/L (04-06 @ 11:45)                            12.5   11.64 )-----------( 219      ( 09 Apr 2025 06:10 )             39.3     04-09    134[L]  |  96  |  20[H]  ----------------------------<  79  3.0[L]   |  30  |  0.90    Ca    9.5      09 Apr 2025 06:10  Phos  3.0     04-09  Mg     1.7     04-09        TSH: Thyroid Stimulating Hormone, Serum: 20.50 uU/mL (04-07 @ 05:25)

## 2025-04-10 ENCOUNTER — RESULT REVIEW (OUTPATIENT)
Age: 89
End: 2025-04-10

## 2025-04-10 LAB
ANION GAP SERPL CALC-SCNC: 7 MMOL/L — SIGNIFICANT CHANGE UP (ref 5–17)
BUN SERPL-MCNC: 24 MG/DL — HIGH (ref 7–18)
CALCIUM SERPL-MCNC: 9.5 MG/DL — SIGNIFICANT CHANGE UP (ref 8.4–10.5)
CHLORIDE SERPL-SCNC: 100 MMOL/L — SIGNIFICANT CHANGE UP (ref 96–108)
CO2 SERPL-SCNC: 29 MMOL/L — SIGNIFICANT CHANGE UP (ref 22–31)
CREAT SERPL-MCNC: 0.87 MG/DL — SIGNIFICANT CHANGE UP (ref 0.5–1.3)
EGFR: 64 ML/MIN/1.73M2 — SIGNIFICANT CHANGE UP
EGFR: 64 ML/MIN/1.73M2 — SIGNIFICANT CHANGE UP
GLUCOSE SERPL-MCNC: 95 MG/DL — SIGNIFICANT CHANGE UP (ref 70–99)
HCT VFR BLD CALC: 37.8 % — SIGNIFICANT CHANGE UP (ref 34.5–45)
HGB BLD-MCNC: 11.9 G/DL — SIGNIFICANT CHANGE UP (ref 11.5–15.5)
MAGNESIUM SERPL-MCNC: 1.7 MG/DL — SIGNIFICANT CHANGE UP (ref 1.6–2.6)
MCHC RBC-ENTMCNC: 26 PG — LOW (ref 27–34)
MCHC RBC-ENTMCNC: 31.5 G/DL — LOW (ref 32–36)
MCV RBC AUTO: 82.5 FL — SIGNIFICANT CHANGE UP (ref 80–100)
NRBC BLD AUTO-RTO: 0 /100 WBCS — SIGNIFICANT CHANGE UP (ref 0–0)
PHOSPHATE SERPL-MCNC: 3 MG/DL — SIGNIFICANT CHANGE UP (ref 2.5–4.5)
PLATELET # BLD AUTO: 197 K/UL — SIGNIFICANT CHANGE UP (ref 150–400)
POTASSIUM SERPL-MCNC: 3.1 MMOL/L — LOW (ref 3.5–5.3)
POTASSIUM SERPL-SCNC: 3.1 MMOL/L — LOW (ref 3.5–5.3)
RBC # BLD: 4.58 M/UL — SIGNIFICANT CHANGE UP (ref 3.8–5.2)
RBC # FLD: 14 % — SIGNIFICANT CHANGE UP (ref 10.3–14.5)
SODIUM SERPL-SCNC: 136 MMOL/L — SIGNIFICANT CHANGE UP (ref 135–145)
WBC # BLD: 7.85 K/UL — SIGNIFICANT CHANGE UP (ref 3.8–10.5)
WBC # FLD AUTO: 7.85 K/UL — SIGNIFICANT CHANGE UP (ref 3.8–10.5)

## 2025-04-10 PROCEDURE — 73721 MRI JNT OF LWR EXTRE W/O DYE: CPT | Mod: 26,LT

## 2025-04-10 PROCEDURE — 99233 SBSQ HOSP IP/OBS HIGH 50: CPT

## 2025-04-10 PROCEDURE — 99232 SBSQ HOSP IP/OBS MODERATE 35: CPT

## 2025-04-10 PROCEDURE — 70553 MRI BRAIN STEM W/O & W/DYE: CPT | Mod: 26

## 2025-04-10 RX ADMIN — Medication 1000 MILLIGRAM(S): at 22:51

## 2025-04-10 RX ADMIN — LIDOCAINE HYDROCHLORIDE 3 PATCH: 20 JELLY TOPICAL at 11:23

## 2025-04-10 RX ADMIN — Medication 88 MICROGRAM(S): at 06:29

## 2025-04-10 RX ADMIN — Medication 2 TABLET(S): at 22:51

## 2025-04-10 RX ADMIN — Medication 25 GRAM(S): at 16:38

## 2025-04-10 RX ADMIN — Medication 40 MILLIEQUIVALENT(S): at 08:53

## 2025-04-10 RX ADMIN — Medication 25 GRAM(S): at 23:39

## 2025-04-10 RX ADMIN — HEPARIN SODIUM 5000 UNIT(S): 1000 INJECTION INTRAVENOUS; SUBCUTANEOUS at 16:39

## 2025-04-10 RX ADMIN — HEPARIN SODIUM 5000 UNIT(S): 1000 INJECTION INTRAVENOUS; SUBCUTANEOUS at 06:29

## 2025-04-10 RX ADMIN — LIDOCAINE HYDROCHLORIDE 3 PATCH: 20 JELLY TOPICAL at 19:16

## 2025-04-10 RX ADMIN — HEPARIN SODIUM 5000 UNIT(S): 1000 INJECTION INTRAVENOUS; SUBCUTANEOUS at 22:51

## 2025-04-10 RX ADMIN — Medication 40 MILLIEQUIVALENT(S): at 11:23

## 2025-04-10 RX ADMIN — OXYCODONE HYDROCHLORIDE 2.5 MILLIGRAM(S): 30 TABLET ORAL at 13:42

## 2025-04-10 RX ADMIN — Medication 1000 MILLIGRAM(S): at 23:21

## 2025-04-10 RX ADMIN — Medication 1 DOSE(S): at 11:24

## 2025-04-10 RX ADMIN — Medication 1 DOSE(S): at 22:51

## 2025-04-10 RX ADMIN — LIDOCAINE HYDROCHLORIDE 3 PATCH: 20 JELLY TOPICAL at 03:00

## 2025-04-10 RX ADMIN — AMLODIPINE BESYLATE 5 MILLIGRAM(S): 10 TABLET ORAL at 06:29

## 2025-04-10 RX ADMIN — Medication 25 GRAM(S): at 06:29

## 2025-04-10 RX ADMIN — Medication 0.5 MILLIGRAM(S): at 13:42

## 2025-04-10 RX ADMIN — Medication 1000 MILLIGRAM(S): at 06:29

## 2025-04-10 NOTE — PROGRESS NOTE ADULT - SUBJECTIVE AND OBJECTIVE BOX
Patient is seen and examined at the bed side, is afebrile. No over night events.      REVIEW OF SYSTEMS: Unable to obtain due to mental status       ALLERGIES: No Known Allergies      Vital Signs Last 24 Hrs  T(C): 36.4 (10 Apr 2025 05:35), Max: 36.8 (09 Apr 2025 12:55)  T(F): 97.6 (10 Apr 2025 05:35), Max: 98.2 (09 Apr 2025 12:55)  HR: 66 (10 Apr 2025 05:35) (63 - 82)  BP: 122/73 (10 Apr 2025 05:35) (111/67 - 122/75)  BP(mean): 91 (09 Apr 2025 12:55) (91 - 91)  RR: 17 (10 Apr 2025 05:35) (17 - 18)  SpO2: 94% (10 Apr 2025 05:35) (94% - 96%)    Parameters below as of 10 Apr 2025 05:35  Patient On (Oxygen Delivery Method): room air        PHYSICAL EXAM:  GENERAL: Not in acute distress  CHEST/LUNG:  Air entry bilaterally  HEART: s1 and s2 present  ABDOMEN:  Nontender and  Nondistended  EXTREMITIES: No pedal  edema  CNS: Awake and  confused      LABS:                         11.9   7.85  )-----------( 197      ( 10 Apr 2025 05:30 )             37.8                           12.5   11.64 )-----------( 219      ( 09 Apr 2025 06:10 )             39.3         04-10    136  |  100  |  24[H]  ----------------------------<  95  3.1[L]   |  29  |  0.87    Ca    9.5      10 Apr 2025 05:30  Phos  3.0     04-10  Mg     1.7     04-10      04-09    134[L]  |  96  |  20[H]  ----------------------------<  79  3.0[L]   |  30  |  0.90    Ca    9.5      09 Apr 2025 06:10  Phos  3.0     04-09  Mg     1.7     04-09      TPro  6.4  /  Alb  2.2[L]  /  TBili  0.4  /  DBili  x   /  AST  32  /  ALT  35  /  AlkPhos  111  04-07    PT/INR - ( 06 Apr 2025 11:45 )   PT: 10.9 sec;   INR: 0.93 ratio         PTT - ( 06 Apr 2025 11:45 )  PTT:31.6 sec      MEDICATIONS  (STANDING):    acetaminophen     Tablet .. 1000 milliGRAM(s) Oral every 8 hours  amLODIPine   Tablet 5 milliGRAM(s) Oral daily  fluticasone propionate/ salmeterol 100-50 MICROgram(s) Diskus 1 Dose(s) Inhalation two times a day  heparin   Injectable 5000 Unit(s) SubCutaneous every 8 hours  levothyroxine 88 MICROGram(s) Oral daily  lidocaine   4% Patch 3 Patch Transdermal daily  LORazepam   Injectable 0.5 milliGRAM(s) IV Push once  naloxone Injectable 0.4 milliGRAM(s) IV Push once  piperacillin/tazobactam IVPB.. 3.375 Gram(s) IV Intermittent every 8 hours  polyethylene glycol 3350 17 Gram(s) Oral daily  potassium chloride   Powder 40 milliEquivalent(s) Oral every 4 hours  senna 2 Tablet(s) Oral at bedtime      RADIOLOGY & ADDITIONAL TESTS:    4/6/25: Xray Femur 2 Views, Left (04.06.25 @ 15:42) IMPRESSION: Subcapital fracture of the left hip. 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      4/6/25: CT Cervical Spine No Cont (04.06.25 @ 13:37) >  IMPRESSION:    CT HEAD : No traumatic intracranial abnormality.    Questionable small left frontal encephalocele extending into the left ethmoid sinus and slightly protruding into the left superomedial orbit.   Recommend MRI of the brain seizure protocol without and with contrast for further evaluation.    Bilateral sphenoid sinus disease.    CT CERVICAL SPINE:    No acute fracture or traumatic malalignment.  Multilevel degenerative changes.      MICROBIOLOGY DATA:      Culture - Blood (04.06.25 @ 11:55)   Specimen Source: Blood Blood  Culture Results: No growth at 72 Hours    Culture - Blood (04.06.25 @ 11:45)   Specimen Source: Blood Blood  Culture Results: No growth at 72 Hours      Urine Microscopic-Add On (NC) (04.06.25 @ 11:55)   White Blood Cell - Urine: 2 /HPF  Red Blood Cell - Urine: 2 /HPF  Bacteria: Moderate /HPF  Squamous Epithelial Cells: Present           Patient is seen and examined at the bed side, is afebrile. Her mental status has improved, less confused.      REVIEW OF SYSTEMS: Unable to obtain due to mental status       ALLERGIES: No Known Allergies      Vital Signs Last 24 Hrs  T(C): 36.4 (10 Apr 2025 05:35), Max: 36.8 (09 Apr 2025 12:55)  T(F): 97.6 (10 Apr 2025 05:35), Max: 98.2 (09 Apr 2025 12:55)  HR: 66 (10 Apr 2025 05:35) (63 - 82)  BP: 122/73 (10 Apr 2025 05:35) (111/67 - 122/75)  BP(mean): 91 (09 Apr 2025 12:55) (91 - 91)  RR: 17 (10 Apr 2025 05:35) (17 - 18)  SpO2: 94% (10 Apr 2025 05:35) (94% - 96%)    Parameters below as of 10 Apr 2025 05:35  Patient On (Oxygen Delivery Method): room air      PHYSICAL EXAM:  GENERAL: Not in acute distress  CHEST/LUNG:  Air entry bilaterally  HEART: s1 and s2 present  ABDOMEN:  Nontender and  Nondistended  EXTREMITIES: No pedal  edema  CNS: Awake and Less confused      LABS:                         11.9   7.85  )-----------( 197      ( 10 Apr 2025 05:30 )             37.8                           12.5   11.64 )-----------( 219      ( 09 Apr 2025 06:10 )             39.3         04-10    136  |  100  |  24[H]  ----------------------------<  95  3.1[L]   |  29  |  0.87    Ca    9.5      10 Apr 2025 05:30  Phos  3.0     04-10  Mg     1.7     04-10      04-09    134[L]  |  96  |  20[H]  ----------------------------<  79  3.0[L]   |  30  |  0.90    Ca    9.5      09 Apr 2025 06:10  Phos  3.0     04-09  Mg     1.7     04-09      TPro  6.4  /  Alb  2.2[L]  /  TBili  0.4  /  DBili  x   /  AST  32  /  ALT  35  /  AlkPhos  111  04-07    PT/INR - ( 06 Apr 2025 11:45 )   PT: 10.9 sec;   INR: 0.93 ratio         PTT - ( 06 Apr 2025 11:45 )  PTT:31.6 sec      MEDICATIONS  (STANDING):    acetaminophen     Tablet .. 1000 milliGRAM(s) Oral every 8 hours  amLODIPine   Tablet 5 milliGRAM(s) Oral daily  fluticasone propionate/ salmeterol 100-50 MICROgram(s) Diskus 1 Dose(s) Inhalation two times a day  heparin   Injectable 5000 Unit(s) SubCutaneous every 8 hours  levothyroxine 88 MICROGram(s) Oral daily  lidocaine   4% Patch 3 Patch Transdermal daily  LORazepam   Injectable 0.5 milliGRAM(s) IV Push once  naloxone Injectable 0.4 milliGRAM(s) IV Push once  piperacillin/tazobactam IVPB.. 3.375 Gram(s) IV Intermittent every 8 hours  polyethylene glycol 3350 17 Gram(s) Oral daily  potassium chloride   Powder 40 milliEquivalent(s) Oral every 4 hours  senna 2 Tablet(s) Oral at bedtime      RADIOLOGY & ADDITIONAL TESTS:    4/6/25: Xray Femur 2 Views, Left (04.06.25 @ 15:42) IMPRESSION: Subcapital fracture of the left hip. 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      4/6/25: CT Cervical Spine No Cont (04.06.25 @ 13:37) >  IMPRESSION:    CT HEAD : No traumatic intracranial abnormality.    Questionable small left frontal encephalocele extending into the left ethmoid sinus and slightly protruding into the left superomedial orbit.   Recommend MRI of the brain seizure protocol without and with contrast for further evaluation.    Bilateral sphenoid sinus disease.    CT CERVICAL SPINE:    No acute fracture or traumatic malalignment.  Multilevel degenerative changes.      MICROBIOLOGY DATA:      Culture - Blood (04.06.25 @ 11:55)   Specimen Source: Blood Blood  Culture Results: No growth at 72 Hours    Culture - Blood (04.06.25 @ 11:45)   Specimen Source: Blood Blood  Culture Results: No growth at 72 Hours      Urine Microscopic-Add On (NC) (04.06.25 @ 11:55)   White Blood Cell - Urine: 2 /HPF  Red Blood Cell - Urine: 2 /HPF  Bacteria: Moderate /HPF  Squamous Epithelial Cells: Present

## 2025-04-10 NOTE — PROGRESS NOTE ADULT - SUBJECTIVE AND OBJECTIVE BOX
Date of Service 04-10-25 @ 11:42    CHIEF COMPLAINT:Patient is a 89y old  Female who presents with a chief complaint of fall .Pt appears comfortable.    	  REVIEW OF SYSTEMS:  CONSTITUTIONAL: No fever, weight loss, or fatigue  EYES: No eye pain, visual disturbances, or discharge  ENT:  No difficulty hearing, tinnitus, vertigo; No sinus or throat pain  NECK: No pain or stiffness  RESPIRATORY: No cough, wheezing, chills or hemoptysis; No Shortness of Breath  CARDIOVASCULAR: No chest pain, palpitations, passing out, dizziness, or leg swelling  GASTROINTESTINAL: No abdominal or epigastric pain. No nausea, vomiting, or hematemesis; No diarrhea or constipation. No melena or hematochezia.  GENITOURINARY: No dysuria, frequency, hematuria, or incontinence  NEUROLOGICAL: No headaches, memory loss, loss of strength, numbness, or tremors  SKIN: No itching, burning, rashes, or lesions   LYMPH Nodes: No enlarged glands  ENDOCRINE: No heat or cold intolerance; No hair loss  MUSCULOSKELETAL: No joint pain or swelling; No muscle, back, or extremity pain  PSYCHIATRIC: No depression, anxiety, mood swings, or difficulty sleeping  HEME/LYMPH: No easy bruising, or bleeding gums  ALLERGY AND IMMUNOLOGIC: No hives or eczema	      PHYSICAL EXAM:  T(C): 36.4 (04-10-25 @ 05:35), Max: 36.8 (04-09-25 @ 12:55)  HR: 66 (04-10-25 @ 05:35) (63 - 82)  BP: 122/73 (04-10-25 @ 05:35) (111/67 - 122/75)  RR: 17 (04-10-25 @ 05:35) (17 - 18)  SpO2: 94% (04-10-25 @ 05:35) (94% - 96%)  Wt(kg): --  I&O's Summary      Appearance: Normal	  HEENT:   Normal oral mucosa, PERRL, EOMI	  Lymphatic: No lymphadenopathy  Cardiovascular: Normal S1 S2, No JVD, No murmurs, No edema  Respiratory: Lungs clear to auscultation	  Psychiatry: A & O x 3, Mood & affect appropriate  Gastrointestinal:  Soft, Non-tender, + BS	  Skin: No rashes, No ecchymoses, No cyanosis	  Neurologic: Non-focal  Extremities: Normal range of motion, No clubbing, cyanosis or edema  Vascular: Peripheral pulses palpable 2+ bilaterally    MEDICATIONS  (STANDING):  acetaminophen     Tablet .. 1000 milliGRAM(s) Oral every 8 hours  amLODIPine   Tablet 5 milliGRAM(s) Oral daily  fluticasone propionate/ salmeterol 100-50 MICROgram(s) Diskus 1 Dose(s) Inhalation two times a day  heparin   Injectable 5000 Unit(s) SubCutaneous every 8 hours  levothyroxine 88 MICROGram(s) Oral daily  lidocaine   4% Patch 3 Patch Transdermal daily  LORazepam   Injectable 0.5 milliGRAM(s) IV Push once  naloxone Injectable 0.4 milliGRAM(s) IV Push once  piperacillin/tazobactam IVPB.. 3.375 Gram(s) IV Intermittent every 8 hours  polyethylene glycol 3350 17 Gram(s) Oral daily  senna 2 Tablet(s) Oral at bedtime        LABS:	 	                          11.9   7.85  )-----------( 197      ( 10 Apr 2025 05:30 )             37.8     04-10    136  |  100  |  24[H]  ----------------------------<  95  3.1[L]   |  29  |  0.87    Ca    9.5      10 Apr 2025 05:30  Phos  3.0     04-10  Mg     1.7     04-10      TSH: Thyroid Stimulating Hormone, Serum: 20.50 uU/mL (04-07 @ 05:25)

## 2025-04-10 NOTE — CHART NOTE - NSCHARTNOTEFT_GEN_A_CORE
Pt seen for severe malnutrition follow up     No recent episodes of nausea, vomiting, diarrhea or constipation per RN. Last BM noted on 4/7 per RN flowsheets. Denies any chewing/swallowing difficulties. Intake is ___% per pt. Food preferences explored and forwarded to dietary. Hypokalemia (3.1)    Diet Prescription: Diet, Regular:   Supplement Feeding Modality:  Oral  Ensure Enlive Cans or Servings Per Day:  1       Frequency:  Three Times a day (04-08-25 @ 10:41)    Pertinent Medications: MEDICATIONS  (STANDING):  acetaminophen     Tablet .. 1000 milliGRAM(s) Oral every 8 hours  amLODIPine   Tablet 5 milliGRAM(s) Oral daily  fluticasone propionate/ salmeterol 100-50 MICROgram(s) Diskus 1 Dose(s) Inhalation two times a day  heparin   Injectable 5000 Unit(s) SubCutaneous every 8 hours  levothyroxine 88 MICROGram(s) Oral daily  lidocaine   4% Patch 3 Patch Transdermal daily  LORazepam   Injectable 0.5 milliGRAM(s) IV Push once  naloxone Injectable 0.4 milliGRAM(s) IV Push once  piperacillin/tazobactam IVPB.. 3.375 Gram(s) IV Intermittent every 8 hours  polyethylene glycol 3350 17 Gram(s) Oral daily  potassium chloride   Powder 40 milliEquivalent(s) Oral every 4 hours  senna 2 Tablet(s) Oral at bedtime    MEDICATIONS  (PRN):  acetaminophen     Tablet .. 650 milliGRAM(s) Oral every 6 hours PRN Temp greater or equal to 38C (100.4F), Mild Pain (1 - 3)  albuterol    90 MICROgram(s) HFA Inhaler 2 Puff(s) Inhalation every 6 hours PRN Shortness of Breath and/or Wheezing  bisacodyl 5 milliGRAM(s) Oral daily PRN Constipation  ondansetron Injectable 4 milliGRAM(s) IV Push every 8 hours PRN Nausea and/or Vomiting  oxyCODONE    IR 2.5 milliGRAM(s) Oral every 4 hours PRN Moderate Pain (4 - 6)  oxyCODONE    IR 5 milliGRAM(s) Oral every 4 hours PRN Severe Pain (7 - 10)    Pertinent Labs: 04-10 Na136 mmol/L Glu 95 mg/dL K+ 3.1 mmol/L[L] Cr  0.87 mg/dL BUN 24 mg/dL[H] 04-10 Phos 3.0 mg/dL 04-07 Alb 2.2 g/dL[L]     CAPILLARY BLOOD GLUCOSE          Weight: 84 lbs  Height: 62 in   IBW: 110 lbs +/-10%  BMI: 15.3 kg/m^2    Physical Assessment, per flowsheets:  Edema: No edema noted per RN flowsheets   Pressure Injury: Stage 2 right shoulder, stage 2 right hip, stage sacrum     Estimated Needs:   [X] No change since previous assessment    Previous Nutrition Diagnosis: [x] Malnutrition   Nutrition Diagnosis is [x] ongoing   New Nutrition Diagnosis: [x] not applicable     Education: [x] Provided on previous assessment by RD    Interventions:   1) Continue current diet order / supplement order as medically feasible.  2) Encourage PO intake and honor food preferences as able.  3) Monitor PO intake, labs, weights, BM's, and skin integrity.    Kory Mathias RD (Available on teams) Pt seen for severe malnutrition follow up     No recent episodes of nausea, vomiting, diarrhea or constipation per RN. Last BM noted on 4/7 per RN flowsheets. Denies any chewing/swallowing difficulties. Intake is 50-75% per tray observation. Food preferences explored and forwarded to dietary. Hypokalemia (3.1)    Diet Prescription: Diet, Regular:   Supplement Feeding Modality:  Oral  Ensure Enlive Cans or Servings Per Day:  1       Frequency:  Three Times a day (04-08-25 @ 10:41)    Pertinent Medications: MEDICATIONS  (STANDING):  acetaminophen     Tablet .. 1000 milliGRAM(s) Oral every 8 hours  amLODIPine   Tablet 5 milliGRAM(s) Oral daily  fluticasone propionate/ salmeterol 100-50 MICROgram(s) Diskus 1 Dose(s) Inhalation two times a day  heparin   Injectable 5000 Unit(s) SubCutaneous every 8 hours  levothyroxine 88 MICROGram(s) Oral daily  lidocaine   4% Patch 3 Patch Transdermal daily  LORazepam   Injectable 0.5 milliGRAM(s) IV Push once  naloxone Injectable 0.4 milliGRAM(s) IV Push once  piperacillin/tazobactam IVPB.. 3.375 Gram(s) IV Intermittent every 8 hours  polyethylene glycol 3350 17 Gram(s) Oral daily  potassium chloride   Powder 40 milliEquivalent(s) Oral every 4 hours  senna 2 Tablet(s) Oral at bedtime    MEDICATIONS  (PRN):  acetaminophen     Tablet .. 650 milliGRAM(s) Oral every 6 hours PRN Temp greater or equal to 38C (100.4F), Mild Pain (1 - 3)  albuterol    90 MICROgram(s) HFA Inhaler 2 Puff(s) Inhalation every 6 hours PRN Shortness of Breath and/or Wheezing  bisacodyl 5 milliGRAM(s) Oral daily PRN Constipation  ondansetron Injectable 4 milliGRAM(s) IV Push every 8 hours PRN Nausea and/or Vomiting  oxyCODONE    IR 2.5 milliGRAM(s) Oral every 4 hours PRN Moderate Pain (4 - 6)  oxyCODONE    IR 5 milliGRAM(s) Oral every 4 hours PRN Severe Pain (7 - 10)    Pertinent Labs: 04-10 Na136 mmol/L Glu 95 mg/dL K+ 3.1 mmol/L[L] Cr  0.87 mg/dL BUN 24 mg/dL[H] 04-10 Phos 3.0 mg/dL 04-07 Alb 2.2 g/dL[L]     CAPILLARY BLOOD GLUCOSE          Weight: 84 lbs  Height: 62 in   IBW: 110 lbs +/-10%  BMI: 15.3 kg/m^2    Physical Assessment, per flowsheets:  Edema: No edema noted per RN flowsheets   Pressure Injury: Stage 2 right shoulder, stage 2 right hip, stage sacrum     Estimated Needs:   [X] No change since previous assessment    Previous Nutrition Diagnosis: [x] Malnutrition   Nutrition Diagnosis is [x] ongoing   New Nutrition Diagnosis: [x] not applicable     Education: [x] Provided on previous assessment by RD    Interventions:   1) Continue current diet order / supplement order as medically feasible.  2) Encourage PO intake and honor food preferences as able.  3) Monitor PO intake, labs, weights, BM's, and skin integrity.    Kory Mathias RD (Available on teams)

## 2025-04-10 NOTE — PROGRESS NOTE ADULT - SUBJECTIVE AND OBJECTIVE BOX
Source of information: [facility] SR, Chart review  Patient language: Slovenian  : Agnes # [id number]    HPI:  89F, from home, ambulates with cane, PMH HTN, hypothyroidism, COPD, ?dementia. Presenting after fall at home ; was found by HHA on the floor. Unknown down time. At the time of exam, patient AAOx2 (to self, place). Does not appear a great historian. States that she had a fall. States she was attempting to stand up when she fell and landed on her side. Denies head trauma. However, not able to clarify if she had palpitations, dizziness, or other prodromal sx prior to the fall. Endorses mildly productive cough >1 week, however unable to quantify exactly how long. Denies fever, chills, chest pain, abd pain, SOB, dysuria (2025 16:01)    Pt is admitted for s/p mechanical fall. GOH[facility]. On CT C/A/P: impaction fracture of Left Hip Femoral Neck Fracture noted. Ortho recommended MRI left hip to r/o acute fx. Ortho/Neurology following. Pain consulted for left hip pain .  Pt seen and examined at bedside this morning. Pt found laying up in bed, awake, alert and oriented to self. Pt Hx of Dementia. Pt is Slovenian speaking,  unable to understand patient. No nonverbal s/s of pain noted. Per chart notes, Family opting for conservative mgmt.    PAST MEDICAL & SURGICAL HISTORY:  HTN (hypertension)    Hypothyroidism    Chronic obstructive pulmonary disease (COPD)    S/P  section    FAMILY HISTORY:    Social History:   [x] Denies ETOH use, illicit drug use and smoking    Allergies    No Known Allergies    MEDICATIONS  (STANDING):  acetaminophen     Tablet .. 1000 milliGRAM(s) Oral every 8 hours  amLODIPine   Tablet 5 milliGRAM(s) Oral daily  fluticasone propionate/ salmeterol 100-50 MICROgram(s) Diskus 1 Dose(s) Inhalation two times a day  heparin   Injectable 5000 Unit(s) SubCutaneous every 8 hours  levothyroxine 88 MICROGram(s) Oral daily  lidocaine   4% Patch 3 Patch Transdermal daily  LORazepam   Injectable 0.5 milliGRAM(s) IV Push once  naloxone Injectable 0.4 milliGRAM(s) IV Push once  piperacillin/tazobactam IVPB.. 3.375 Gram(s) IV Intermittent every 8 hours  polyethylene glycol 3350 17 Gram(s) Oral daily  senna 2 Tablet(s) Oral at bedtime    MEDICATIONS  (PRN):  albuterol    90 MICROgram(s) HFA Inhaler 2 Puff(s) Inhalation every 6 hours PRN Shortness of Breath and/or Wheezing  bisacodyl 5 milliGRAM(s) Oral daily PRN Constipation  ondansetron Injectable 4 milliGRAM(s) IV Push every 8 hours PRN Nausea and/or Vomiting  oxyCODONE    IR 2.5 milliGRAM(s) Oral every 4 hours PRN Moderate Pain (4 - 6)  oxyCODONE    IR 5 milliGRAM(s) Oral every 4 hours PRN Severe Pain (7 - 10)      Vital Signs Last 24 Hrs  T(C): 36.4 (10 Apr 2025 05:35), Max: 36.8 (2025 12:55)  T(F): 97.6 (10 Apr 2025 05:35), Max: 98.2 (2025 12:55)  HR: 66 (10 Apr 2025 05:35) (63 - 82)  BP: 122/73 (10 Apr 2025 05:35) (111/67 - 122/75)  BP(mean): 91 (2025 12:55) (91 - 91)  RR: 17 (10 Apr 2025 05:35) (17 - 18)  SpO2: 94% (10 Apr 2025 05:35) (94% - 96%)    Parameters below as of 10 Apr 2025 05:35  Patient On (Oxygen Delivery Method): room air        LABS: Reviewed                          11.9   7.85  )-----------( 197      ( 10 Apr 2025 05:30 )             37.8     04-10    136  |  100  |  24[H]  ----------------------------<  95  3.1[L]   |  29  |  0.87    Ca    9.5      10 Apr 2025 05:30  Phos  3.0     04-10  Mg     1.7     04-10    Urinalysis Basic - ( 10 Apr 2025 05:30 )    Color: x / Appearance: x / SG: x / pH: x  Gluc: 95 mg/dL / Ketone: x  / Bili: x / Urobili: x   Blood: x / Protein: x / Nitrite: x   Leuk Esterase: x / RBC: x / WBC x   Sq Epi: x / Non Sq Epi: x / Bacteria: x    Radiology: Reviewed.     ACC: [id number] EXAM:  CT ABDOMEN AND PELVIS   ORDERED BY: DUSTY RODGERS     ACC: [id number] EXAM:  CT CHEST   ORDERED BY: DUSTY RODGERS     PROCEDURE DATE:  2025      INTERPRETATION:  CLINICAL INFORMATION: Ground-level fall.    COMPARISON: None.    CONTRAST/COMPLICATIONS:  IV Contrast: NONE  Oral Contrast: NONE    PROCEDURE:  CT of the Chest, Abdomen and Pelvis was performed.  Sagittal and coronal reformats were performed.    FINDINGS:  CHEST:  LUNGS AND LARGE AIRWAYS: Patent central airways. Multifocal cystic   bronchiectasis, worst in the lingula. Associated mucous plugging and   peribronchial opacities/nodularities.  PLEURA: No pleural effusion.  VESSELS: Atheromatous calcifications of the thoracic aorta.  HEART: Heart size is normal. No pericardial effusion.  MEDIASTINUM AND DAGO: No lymphadenopathy.  CHEST WALL AND LOWER NECK: Within normal limits.    ABDOMEN AND PELVIS:  LIVER: Within normal limits.  BILE DUCTS: Mild intra and extrahepatic biliary duct dilatation.  GALLBLADDER: Within normal limits.  SPLEEN: Within normal limits.  PANCREAS: Pancreatic parenchymal atrophy and mild duct dilatation.  ADRENALS: Within normal limits.  KIDNEYS/URETERS: Within normal limits.    BLADDER: Within normal limits.  REPRODUCTIVE ORGANS:Uterus and adnexa within normal limits.    BOWEL: No bowel obstruction. Appendix is normal.  PERITONEUM/RETROPERITONEUM: Within normal limits.  VESSELS: Within normal limits.  LYMPH NODES: No lymphadenopathy.  ABDOMINAL WALL: Within normal limits.  BONES: Osteopenia. Impaction fracture of left femoral neck.    IMPRESSION:  Impaction fracture of left femoral neck.    No CT evidence of visceral injury in the chest, abdomen and pelvis.    Bronchiectasis and peribronchial nodularity suspicious for ROWDY infection.    --- End of Report ---    TYRELL BACK MD; Attending Radiologist  This document has been electronically signed. 2025  2:22PM  ACC: [id number] EXAM:  XR FEMUR 2 VIEWS LT   ORDERED BY: DUSTY RODGERS     ACC: [id number] EXAM:  XR HIPS BI WITH PELV 3-4V   ORDERED BY: DUSTY RODGERS     PROCEDURE DATE:  2025      INTERPRETATION:  Bilateral hips and pelvis and left femur. Patient had a   fall.    Bilateral hips and pelvis. There is moderate left knee degeneration.    IMPRESSION: Subcapital fracture of the left hip. 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    --- End of Report ---    PABLITO ARNETT MD; Attending Radiologist  This document has been electronically signed. 2025 10:33AM    ORT Score -   unable to do, due to Hx of Dementia    REVIEW OF SYSTEMS:  unable to do, due to Hx of Dementia    PHYSICAL EXAM:  GENERAL:  + lethargic & Oriented X 1, NAD, Speech is soft and muffled.   RESPIRATORY: Respirations even and unlabored. Clear to auscultation bilaterally; No rales, rhonchi, wheezing, or rubs  CARDIOVASCULAR: Normal S1/S2, regular rate and rhythm; No murmurs, rubs, or gallops. No JVD.   GASTROINTESTINAL:  Soft, Nontender, Nondistended; Bowel sounds present  PERIPHERAL VASCULAR:  Extremities warm without edema. 2+ Peripheral Pulses, No cyanosis,   MUSCULOSKELETAL:  + tenderness on palpation of left hip  SKIN: Warm, dry, intact. No rashes, lesions, scars or wounds. +left hip ecchymotic.     Risk factors associated with adverse outcomes related to opioid treatment  [ ]  Concurrent benzodiazepine use  [ ]  History/ Active substance use or alcohol use disorder  [x] Psychiatric co-morbidity-Dementia  [ ] Sleep apnea  [x] COPD  [ ] BMI> 35  [ ] Liver dysfunction  [ ] Renal dysfunction  [ ] CHF  [ ] Smoker  [x]  Age > 60 years    [x]  JULIETA  Reviewed and Copied to Chart. See below.    04-10-25 @ 11:58

## 2025-04-10 NOTE — PROGRESS NOTE ADULT - SUBJECTIVE AND OBJECTIVE BOX
Patient is a 89y old  Female who presents with a chief complaint of fall (10 Apr 2025 11:57)    OVERNIGHT EVENTS: no acute changes.     Pt is awake, confused, tolerating PO, remains bedridden.     REVIEW OF SYSTEMS:  limited exam due to mental status    T(C): 36.4 (04-10-25 @ 13:35), Max: 36.4 (04-10-25 @ 05:35)  HR: 60 (04-10-25 @ 13:35) (60 - 66)  BP: 113/64 (04-10-25 @ 13:35) (111/67 - 122/73)  RR: 16 (04-10-25 @ 13:35) (16 - 18)  SpO2: 93% (04-10-25 @ 13:35) (93% - 95%)  Wt(kg): --Vital Signs Last 24 Hrs  T(C): 36.4 (10 Apr 2025 13:35), Max: 36.4 (10 Apr 2025 05:35)  T(F): 97.5 (10 Apr 2025 13:35), Max: 97.6 (10 Apr 2025 05:35)  HR: 60 (10 Apr 2025 13:35) (60 - 66)  BP: 113/64 (10 Apr 2025 13:35) (111/67 - 122/73)  BP(mean): --  RR: 16 (10 Apr 2025 13:35) (16 - 18)  SpO2: 93% (10 Apr 2025 13:35) (93% - 95%)    Parameters below as of 10 Apr 2025 13:35  Patient On (Oxygen Delivery Method): room air        MEDICATIONS  (STANDING):  acetaminophen     Tablet .. 1000 milliGRAM(s) Oral every 8 hours  amLODIPine   Tablet 5 milliGRAM(s) Oral daily  fluticasone propionate/ salmeterol 100-50 MICROgram(s) Diskus 1 Dose(s) Inhalation two times a day  heparin   Injectable 5000 Unit(s) SubCutaneous every 8 hours  levothyroxine 88 MICROGram(s) Oral daily  lidocaine   4% Patch 3 Patch Transdermal daily  naloxone Injectable 0.4 milliGRAM(s) IV Push once  piperacillin/tazobactam IVPB.. 3.375 Gram(s) IV Intermittent every 8 hours  polyethylene glycol 3350 17 Gram(s) Oral daily  senna 2 Tablet(s) Oral at bedtime    MEDICATIONS  (PRN):  albuterol    90 MICROgram(s) HFA Inhaler 2 Puff(s) Inhalation every 6 hours PRN Shortness of Breath and/or Wheezing  bisacodyl 5 milliGRAM(s) Oral daily PRN Constipation  ondansetron Injectable 4 milliGRAM(s) IV Push every 8 hours PRN Nausea and/or Vomiting  oxyCODONE    IR 2.5 milliGRAM(s) Oral every 4 hours PRN Moderate Pain (4 - 6)  oxyCODONE    IR 5 milliGRAM(s) Oral every 4 hours PRN Severe Pain (7 - 10)      PHYSICAL EXAM:  GENERAL: anxious appearing, thin/frail   EYES: clear conjunctiva  ENMT: Moist mucous membranes  NECK: Supple, No JVD, Normal thyroid  CHEST/LUNG: Clear to auscultation bilaterally; No rales, rhonchi, wheezing, or rubs  HEART: S1, S2, Regular rate and rhythm  ABDOMEN: Soft, Nontender, Nondistended; Bowel sounds present  NEURO: confused, mumbled speech  EXTREMITIES: +left hip tenderness. No LE edema, no calf tenderness  LYMPH: No lymphadenopathy noted  SKIN: No rashes or lesions    Consultant(s) Notes Reviewed:  [x ] YES  [ ] NO  Care Discussed with Consultants/Other Providers [ x] YES  [ ] NO    LABS:                        11.9   7.85  )-----------( 197      ( 10 Apr 2025 05:30 )             37.8     04-10    136  |  100  |  24[H]  ----------------------------<  95  3.1[L]   |  29  |  0.87    Ca    9.5      10 Apr 2025 05:30  Phos  3.0     04-10  Mg     1.7     04-10        CAPILLARY BLOOD GLUCOSE            Urinalysis Basic - ( 10 Apr 2025 05:30 )    Color: x / Appearance: x / SG: x / pH: x  Gluc: 95 mg/dL / Ketone: x  / Bili: x / Urobili: x   Blood: x / Protein: x / Nitrite: x   Leuk Esterase: x / RBC: x / WBC x   Sq Epi: x / Non Sq Epi: x / Bacteria: x        RADIOLOGY & ADDITIONAL TESTS:  < from: MR Hip No Cont, Left (04.10.25 @ 15:06) >    ACC: [id number] EXAM:  MR HIP LT   ORDERED BY: AN NYE     PROCEDURE DATE:  04/10/2025          INTERPRETATION:  EXAMINATION: MRI left hip without contrast    CLINICAL INFORMATION: Left hip pain    TECHNIQUE: Multiplanar, multisequential MR imaging was performed.    FINDINGS: There is a nondisplaced subcapital left femoral neck fracture   with extension into the anterior aspect of the femoral head. There is   adjacent marrow edema. There are no other fractures. There is mild left   hip osteoarthritis. There is a small left hip joint effusion. There are   no acute high-grade or full-thickness tendon or muscle tears. There is   mild edema in the left adductor muscles. There is no muscle atrophy.    IMPRESSION: Acute nondisplaced fracture of the left femoral head/left   subcapital femoral neck.    --- End of Report ---            JENNIFER SHRESTHA MD; Attending Radiologist  This document has been electronically signed. Apr 10 2025  3:18PM    < end of copied text >      Imaging Personally Reviewed:  [ ] YES  [ ] NO

## 2025-04-10 NOTE — PROGRESS NOTE ADULT - SUBJECTIVE AND OBJECTIVE BOX
Patient is a 89y old  Female who presents with a chief complaint of fall (10 Apr 2025 08:54)  Awake, alert, lying in bed in NAD. Doing well on RA    INTERVAL HPI/OVERNIGHT EVENTS:      VITAL SIGNS:  T(F): 97.6 (04-10-25 @ 05:35)  HR: 66 (04-10-25 @ 05:35)  BP: 122/73 (04-10-25 @ 05:35)  RR: 17 (04-10-25 @ 05:35)  SpO2: 94% (04-10-25 @ 05:35)  Wt(kg): --  I&O's Detail          REVIEW OF SYSTEMS:    CONSTITUTIONAL:  No fevers, chills, sweats    HEENT:  Eyes:  No diplopia or blurred vision. ENT:  No earache, sore throat or runny nose.    CARDIOVASCULAR:  No pressure, squeezing, tightness, or heaviness about the chest; no palpitations.    RESPIRATORY:  Per HPI    GASTROINTESTINAL:  No abdominal pain, nausea, vomiting or diarrhea.    GENITOURINARY:  No dysuria, frequency or urgency.    NEUROLOGIC:  No paresthesias, fasciculations, seizures or weakness.    PSYCHIATRIC:  No disorder of thought or mood.      PHYSICAL EXAM:    Constitutional: Well developed and nourished  Eyes:Perrla  ENMT: normal  Neck:supple  Respiratory: good air entry  Cardiovascular: S1 S2 regular  Gastrointestinal: Soft, Non tender  Extremities: No edema  Vascular:normal  Neurological:Awake, alert  Musculoskeletal:Normal      MEDICATIONS  (STANDING):  acetaminophen     Tablet .. 1000 milliGRAM(s) Oral every 8 hours  amLODIPine   Tablet 5 milliGRAM(s) Oral daily  fluticasone propionate/ salmeterol 100-50 MICROgram(s) Diskus 1 Dose(s) Inhalation two times a day  heparin   Injectable 5000 Unit(s) SubCutaneous every 8 hours  levothyroxine 88 MICROGram(s) Oral daily  lidocaine   4% Patch 3 Patch Transdermal daily  LORazepam   Injectable 0.5 milliGRAM(s) IV Push once  naloxone Injectable 0.4 milliGRAM(s) IV Push once  piperacillin/tazobactam IVPB.. 3.375 Gram(s) IV Intermittent every 8 hours  polyethylene glycol 3350 17 Gram(s) Oral daily  potassium chloride   Powder 40 milliEquivalent(s) Oral every 4 hours  senna 2 Tablet(s) Oral at bedtime    MEDICATIONS  (PRN):  albuterol    90 MICROgram(s) HFA Inhaler 2 Puff(s) Inhalation every 6 hours PRN Shortness of Breath and/or Wheezing  bisacodyl 5 milliGRAM(s) Oral daily PRN Constipation  ondansetron Injectable 4 milliGRAM(s) IV Push every 8 hours PRN Nausea and/or Vomiting  oxyCODONE    IR 2.5 milliGRAM(s) Oral every 4 hours PRN Moderate Pain (4 - 6)  oxyCODONE    IR 5 milliGRAM(s) Oral every 4 hours PRN Severe Pain (7 - 10)      Allergies    No Known Allergies    Intolerances        LABS:                        11.9   7.85  )-----------( 197      ( 10 Apr 2025 05:30 )             37.8     04-10    136  |  100  |  24[H]  ----------------------------<  95  3.1[L]   |  29  |  0.87    Ca    9.5      10 Apr 2025 05:30  Phos  3.0     04-10  Mg     1.7     04-10        Urinalysis Basic - ( 10 Apr 2025 05:30 )    Color: x / Appearance: x / SG: x / pH: x  Gluc: 95 mg/dL / Ketone: x  / Bili: x / Urobili: x   Blood: x / Protein: x / Nitrite: x   Leuk Esterase: x / RBC: x / WBC x   Sq Epi: x / Non Sq Epi: x / Bacteria: x            CAPILLARY BLOOD GLUCOSE            RADIOLOGY & ADDITIONAL TESTS:    CXR:    Ct scan chest:    ekg;    echo:

## 2025-04-10 NOTE — PROGRESS NOTE ADULT - PROBLEM SELECTOR PLAN 1
Pt with acute left hip pain which is somatic in nature due to left hip fracture s/p mechanical fall. GOHCO. On CT C/A/P: impaction fracture of Left Hip Femoral Neck Fracture noted.  Pending MRI left hip to r/o acute fx. Ortho/Neurology following. Per chart notes, Family opting for conservative mgmt.   High risk medications reviewed. Avoid polypharmacy. Avoid IV opioids. Avoid NSAIDs and benzodiazepines. Non-pharmacological sleep aides initiated. Non-opioid medications and non-pharmacological pain management measures initiated.   Opioid pain recommendations   - Continue Oxycodone 2.5/5 mg PO q4h PRN moderate/severe pain. Monitor for sedation/ respiratory depression.   Non-opioid pain recommendations   - Continue Acetaminophen 1 gram PO q 8 hours x 4 days. Monitor LFT's  - Continue Lidocaine 4% patches x 3 daily.  Bowel Regimen  - Continue Miralax 17G PO daily  - Continue Senna 2 tablets at bedtime for constipation  Mild pain   - Non-pharmacological pain treatment recommendations  - Warm/ Cool packs PRN   - Repositioning extremity, guided imagery, relaxation, distraction.  - Physical therapy OOB if no contraindications   Recommendations discussed with primary team and RN.

## 2025-04-10 NOTE — PROGRESS NOTE ADULT - PROBLEM SELECTOR PLAN 1
CT noted above  MRI left hip - showing Acute nondisplaced fracture of the left femoral head/left   subcapital femoral neck.   Family opting for conservative mgmt  Pain control  PWB  Ortho following  PT consulted  Recommendations appreciated

## 2025-04-10 NOTE — PROGRESS NOTE ADULT - SUBJECTIVE AND OBJECTIVE BOX
Patient is a 89y old  Female who presents with a chief complaint of fall (09 Apr 2025 12:42)    pt seen in icu [  ], reg med floor [ x  ], bed [x  ], chair at bedside [   ], a+o x3 [x  ], lethargic [  ],  nad [ x ]      Allergies    No Known Allergies        Vitals    T(F): 97.6 (04-10-25 @ 05:35), Max: 98.2 (04-09-25 @ 12:55)  HR: 66 (04-10-25 @ 05:35) (63 - 82)  BP: 122/73 (04-10-25 @ 05:35) (111/67 - 122/75)  RR: 17 (04-10-25 @ 05:35) (17 - 18)  SpO2: 94% (04-10-25 @ 05:35) (94% - 96%)  Wt(kg): --  CAPILLARY BLOOD GLUCOSE          Labs                          11.9   7.85  )-----------( 197      ( 10 Apr 2025 05:30 )             37.8       04-10    136  |  100  |  24[H]  ----------------------------<  95  3.1[L]   |  29  |  0.87    Ca    9.5      10 Apr 2025 05:30  Phos  3.0     04-10  Mg     1.7     04-10              Blood Blood  04-06 @ 11:55   No growth at 72 Hours  --  --      Blood Blood  04-06 @ 11:45   No growth at 72 Hours  --  --          Radiology Results      Meds    MEDICATIONS  (STANDING):  acetaminophen     Tablet .. 1000 milliGRAM(s) Oral every 8 hours  amLODIPine   Tablet 5 milliGRAM(s) Oral daily  fluticasone propionate/ salmeterol 100-50 MICROgram(s) Diskus 1 Dose(s) Inhalation two times a day  heparin   Injectable 5000 Unit(s) SubCutaneous every 8 hours  levothyroxine 88 MICROGram(s) Oral daily  lidocaine   4% Patch 3 Patch Transdermal daily  LORazepam   Injectable 0.5 milliGRAM(s) IV Push once  naloxone Injectable 0.4 milliGRAM(s) IV Push once  piperacillin/tazobactam IVPB.. 3.375 Gram(s) IV Intermittent every 8 hours  polyethylene glycol 3350 17 Gram(s) Oral daily  senna 2 Tablet(s) Oral at bedtime      MEDICATIONS  (PRN):  acetaminophen     Tablet .. 650 milliGRAM(s) Oral every 6 hours PRN Temp greater or equal to 38C (100.4F), Mild Pain (1 - 3)  albuterol    90 MICROgram(s) HFA Inhaler 2 Puff(s) Inhalation every 6 hours PRN Shortness of Breath and/or Wheezing  bisacodyl 5 milliGRAM(s) Oral daily PRN Constipation  ondansetron Injectable 4 milliGRAM(s) IV Push every 8 hours PRN Nausea and/or Vomiting  oxyCODONE    IR 2.5 milliGRAM(s) Oral every 4 hours PRN Moderate Pain (4 - 6)  oxyCODONE    IR 5 milliGRAM(s) Oral every 4 hours PRN Severe Pain (7 - 10)      Physical Exam    Neuro :  no focal deficits  Respiratory: CTA B/L  CV: RRR, S1S2, no murmurs,   Abdominal: Soft, NT, ND +BS,  Extremities: No edema, + peripheral pulses    ASSESSMENT    left femoral neck fx,   bronchiectasis with karen,   licha,    demand ischemia   left frontal encephalocele,   h/o HTN,   hypothyroidism,   COPD,   ?dementia       PLAN    cont pain control,   pain mgmt f/u   Opioid pain recommendations   - Continue Oxycodone 2.5/5 mg PO q4h PRN moderate/severe pain. Monitor for sedation/ respiratory depression.   Non-opioid pain recommendations   - Start Acetaminophen 1 gram PO q 8 hours x 4 days. Monitor LFT's  - Start Lidocaine 4% patches x 3 daily.  Bowel Regimen  - Continue Miralax 17G PO daily  - Continue Senna 2 tablets at bedtime for constipation  Mild pain   - Non-pharmacological pain treatment recommendations  - Warm/ Cool packs PRN   - Repositioning extremity, guided imagery, relaxation, distraction.  - Physical therapy OOB if no contraindications   ortho f/u    Conservative management ,   no surgical orthopedic intervention is recommended at this time PENDING MRI ,   if MRI shows chronic or subacute fracture to left hip will remain conservative     >  If MRI of hip shows acute fracture will have another discussion with HCP   and reconsider surgical intervention  -  Pending MRI left hip for chronicity determination of left hip Fx  -  DVT prophylaxis with heparin and Venodynes  -  Daily PT: PWB to LLE at this time, OOB with assistive device   phys tx eval   id f/u   Sputum for AFB stain and culture X 3 if possible  cont Zosyn to treat sinusitis  pulm f/u    Bronchodilators  Budesonide 0.25 mgs one unit  dose neb BID  Montelukast 10 mgs po Qhs.  serum creat wnl   supplement potassium    phos wnl  trop trending down   cardio f/u   cont norvasc   f/u echo   neuro f/u  - await MRI Brain W/WO Constantino to R/O infectious Vs inflammatory etiologies given   L frontal encephelocele extending into sinus spaces(pt may not tolerate the MRI).   tsh elevated 20.5  ft4 1.1   endo f/u  cont lt4 to 88 mcg  tfts in 4-6 wks  cont current meds

## 2025-04-10 NOTE — PROGRESS NOTE ADULT - ASSESSMENT
Patient is a 89y old  Female who is from home, ambulates with cane, and PMH of HTN, hypothyroidism, COPD, ?dementia. now Presents to the ER for evaluation of A fall at home, was found by HHA on the floor. Unknown down time. In ER, patient AAOx2 (to self, place). States that she had a fall while attempting to stand up, fell and landed on her side. Denies head trauma. On admission, she found to have no fever but Subcapital fracture of the left hip and Bilateral sphenoid sinus disease.  The CT chest shows Bronchiectasis, hence the ID consult requested to assist with further evaluation and rule out ROWDY.    # Bilateral sphenoid sinusitis  # Bronchiectasis, R/O ROWDY - unable to  produce Sputum even with " sputum Induction" by Respiratory team  # Subcapital fracture of the left hip after  A fall    would recommend:    1. Continue Zosyn to treat sinusitis  2. Sputum for AFB stain and culture X 3 if possible  3.  Management of  Subcapital fracture of the left hip as per Ortho team    d/w DR. Perez and Nursing staff    Attending Attestation:    Spent more than 35 minutes on total encounter, more than 50 % of the visit was spent counseling and/or coordinating care by the Attending physician.     Patient is a 89y old  Female who is from home, ambulates with cane, and PMH of HTN, hypothyroidism, COPD, ?dementia. now Presents to the ER for evaluation of A fall at home, was found by HHA on the floor. Unknown down time. In ER, patient AAOx2 (to self, place). States that she had a fall while attempting to stand up, fell and landed on her side. Denies head trauma. On admission, she found to have no fever but Subcapital fracture of the left hip and Bilateral sphenoid sinus disease.  The CT chest shows Bronchiectasis, hence the ID consult requested to assist with further evaluation and rule out ROWDY.    # Bilateral sphenoid sinusitis  # Bronchiectasis, R/O ROWDY - unable to  produce Sputum even with " sputum Induction" by Respiratory team and  Family does not want any invasive procedure such as Bronchodcopy  # Subcapital fracture of the left hip after  A fall    would recommend:    1. OOB to chair  2. Continue Zosyn to treat sinusitis while inpatient, may change to oral Augmentin 875 mg q 12hours on discharge to continue until 4/19/25  3. Sputum for AFB stain and culture X 3 if possible  4. Management of  Subcapital fracture of the left hip as per Ortho team    d/w DR. Perez and Nursing staff    Attending Attestation:    Spent more than 35 minutes on total encounter, more than 50 % of the visit was spent counseling and/or coordinating care by the Attending physician.

## 2025-04-10 NOTE — PROGRESS NOTE ADULT - ASSESSMENT
89 y/o F, [place]i speaking from home, ambulates with cane, PMH HTN, hypothyroidism, COPD, dementia. Presenting after fall at home ; was found by Niece on the floor.    CT HEAD: Small LEFT frontal encephalocele.   CT C/A/P: impaction fracture of LEFT femoral neck. Bronchiectasis and peribronchial nodularity suspicious for ROWDY infection.   SCr 1.53. Trop 109, proBNP 3k.   Admitted for mechanical fall, bronchiectasis exacerbation, ROWDY workup, and MONO..     Troponin downtrended to 90, Cardiology Dr. Richmond consulted. awaiting echo.   Mono resolved.   Ortho following rec MRI left hip - showing Acute nondisplaced fracture of the left femoral head/left   subcapital femoral neck.   Likely for conservative management.   Neuro Dr. 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   ID Dr. Romo consulted for concern of ROWDY infection, unable to collect sputum cultures.  PT eval.

## 2025-04-10 NOTE — PROGRESS NOTE ADULT - SUBJECTIVE AND OBJECTIVE BOX
NEUROLOGY FOLLOW-UP NOTE    NAME:  FIFI SR      ASSESSMENT:  89F with reported history of dementia and incidental small left frontal encephalocele, without evidence of acute intracranial abnormality, also with left femoral neck fracture following a fall      RECOMMENDATIONS:    - No role for neurosurgical intervention for incidental finding of small left frontal encephalocele    - Management of left femoral neck fracture as per Orthopedics team    - Metabolic and infectious workup and treatment as per primary team    - DVT ppx: SCDs, Heparin          NOTE TO BE COMPLETED - PLEASE REFER TO ABOVE ONLY AND IGNORE INFORMATION BELOW    ******************************    HPI:  89F, from home, ambulates with cane, PMH HTN, hypothyroidism, COPD, ?dementia. Presenting after fall at home ; was found by HHA on the floor. Unknown down time. At the time of exam, patient AAOx2 (to self, place). Does not appear a great historian. States that she had a fall. States she was attempting to stand up when she fell and landed on her side. Denies head trauma. However, not able to clarify if she had palpitations, dizziness, or other prodromal sx prior to the fall. Endorses mildly productive cough >1 week, however unable to quantify exactly how long. Denies fever, chills, chest pain, abd pain, SOB, dysuria.  (06 Apr 2025 16:01)      NEURO HPI:      INTERVAL HISTORY:      MEDICATIONS:  acetaminophen     Tablet .. 1000 milliGRAM(s) Oral every 8 hours  albuterol    90 MICROgram(s) HFA Inhaler 2 Puff(s) Inhalation every 6 hours PRN  amLODIPine   Tablet 5 milliGRAM(s) Oral daily  bisacodyl 5 milliGRAM(s) Oral daily PRN  fluticasone propionate/ salmeterol 100-50 MICROgram(s) Diskus 1 Dose(s) Inhalation two times a day  heparin   Injectable 5000 Unit(s) SubCutaneous every 8 hours  levothyroxine 88 MICROGram(s) Oral daily  lidocaine   4% Patch 3 Patch Transdermal daily  naloxone Injectable 0.4 milliGRAM(s) IV Push once  ondansetron Injectable 4 milliGRAM(s) IV Push every 8 hours PRN  oxyCODONE    IR 2.5 milliGRAM(s) Oral every 4 hours PRN  oxyCODONE    IR 5 milliGRAM(s) Oral every 4 hours PRN  piperacillin/tazobactam IVPB.. 3.375 Gram(s) IV Intermittent every 8 hours  polyethylene glycol 3350 17 Gram(s) Oral daily  senna 2 Tablet(s) Oral at bedtime      ALLERGIES:  No Known Allergies      REVIEW OF SYSTEMS:  Fourteen systems reviewed and negative except as in HPI / Interval History.          OBJECTIVE:  Vital Signs Last 24 Hrs  T(C): 36.4 (04-10-25 @ 13:35), Max: 36.4 (04-10-25 @ 05:35)  HR: 60 (04-10-25 @ 13:35) (60 - 66)  BP: 113/64 (04-10-25 @ 13:35) (111/67 - 122/73)  RR: 16 (04-10-25 @ 13:35) (16 - 18)  SpO2: 93% (04-10-25 @ 13:35) (93% - 95%)  Parameters below as of 10 Apr 2025 13:35  Patient On (Oxygen Delivery Method): room air      General Examination:  General: No acute distress  HEENT: Atraumatic, Normocephalic  Respiratory: CTA B/l.  No crackles, rhonchi, or wheezes.  Cardiovascular: RRR.  Normal S1 & S2.  Normal b/l radial and pedal pulses.    Neurological Examination:  General / Mental Status: AAO x 3.  No aphasia or dysarthria.  Naming and repetition intact.  Cranial Nerves: VFF x 4.  PERRL.  EOMI x 2, No nystagmus or diplopia.  B/l V1-V3 equal and intact to light touch and pinprick.  Symmetric facial movement and palate elevation.  B/l hearing equal to finger rub.  5/5 strength with b/l sternocleidomastoid and trapezius.  Midline tongue protrusion, with no atrophy or fasciculations.  Motor: Normal bulk & tone in all four extremities.  5/5 strength throughout all four extremities.  No downward drift, rigidity, spasticity, or tremors in any of the four extremities.  Sensory: Intact to light touch and pinprick in all four extremities.  Negative Romberg.  Reflex: 2+ and symmetric at b/l biceps, triceps, brachioradialis, patellae, and ankles.  Downgoing toes b/l.  Coordination: No dysmetria with b/l finger-to-nose and heel raise tests.  Symmetric rapid alternating movements b/l.  Gait: Normal, narrow-based gait.  No difficulty with tiptoe, heel, and tandem gaits.          LABORATORY VALUES:                          11.9   7.85  )-----------( 197      ( 10 Apr 2025 05:30 )             37.8     04-10    136  |  100  |  24[H]  ----------------------------<  95  3.1[L]   |  29  |  0.87    Ca    9.5      10 Apr 2025 05:30  Phos  3.0     04-10  Mg     1.7     04-10      Urinalysis Basic - ( 10 Apr 2025 05:30 )  Color: x / Appearance: x / SG: x / pH: x  Gluc: 95 mg/dL / Ketone: x  / Bili: x / Urobili: x   Blood: x / Protein: x / Nitrite: x   Leuk Esterase: x / RBC: x / WBC x   Sq Epi: x / Non Sq Epi: x / Bacteria: x    Glucose Trend  04-10-25 @ 05:30   -  95 -- --  04-09-25 @ 06:10   - 79 -- --          NEUROIMAGING:          Please contact the Neurology consult service with any neurological questions.      Vadim Velez MD   of Neurology  [facility]

## 2025-04-10 NOTE — PROGRESS NOTE ADULT - ASSESSMENT
Confidential Drug Utilization Report  Search Terms: Christiana Romeo, 1935Search Date: 04/09/2025 16:13:42 PM  The Drug Utilization Report below displays all of the controlled substance prescriptions, if any, that your patient has filled in the last twelve months. The information displayed on this report is compiled from pharmacy submissions to the Department, and accurately reflects the information as submitted by the pharmacies.    This report was requested by: Vika Denson | Reference #: [id number]    There are no results for the search terms that you entered.

## 2025-04-10 NOTE — PROGRESS NOTE ADULT - ASSESSMENT
89F, from home, ambulates with cane, PMH HTN, hypothyroidism, COPD, ?dementia. Presenting after fall at home,Lt hip fx,pneumonia.  1.Echocardiogram.  2.Await MR-Lt hip.  3.HTN-norvasc.  4.Hypothyroid-synthroid.  5.COPD-management as per Pulm.  6.Pneumonia-abx.  7.Replace k+.  8.GI and DVT prophylaxis.

## 2025-04-11 LAB
ANION GAP SERPL CALC-SCNC: 5 MMOL/L — SIGNIFICANT CHANGE UP (ref 5–17)
BUN SERPL-MCNC: 21 MG/DL — HIGH (ref 7–18)
CALCIUM SERPL-MCNC: 9.4 MG/DL — SIGNIFICANT CHANGE UP (ref 8.4–10.5)
CHLORIDE SERPL-SCNC: 101 MMOL/L — SIGNIFICANT CHANGE UP (ref 96–108)
CO2 SERPL-SCNC: 30 MMOL/L — SIGNIFICANT CHANGE UP (ref 22–31)
CREAT SERPL-MCNC: 1 MG/DL — SIGNIFICANT CHANGE UP (ref 0.5–1.3)
CULTURE RESULTS: SIGNIFICANT CHANGE UP
CULTURE RESULTS: SIGNIFICANT CHANGE UP
EGFR: 54 ML/MIN/1.73M2 — LOW
EGFR: 54 ML/MIN/1.73M2 — LOW
GLUCOSE SERPL-MCNC: 87 MG/DL — SIGNIFICANT CHANGE UP (ref 70–99)
MAGNESIUM SERPL-MCNC: 1.8 MG/DL — SIGNIFICANT CHANGE UP (ref 1.6–2.6)
POTASSIUM SERPL-MCNC: 4.2 MMOL/L — SIGNIFICANT CHANGE UP (ref 3.5–5.3)
POTASSIUM SERPL-SCNC: 4.2 MMOL/L — SIGNIFICANT CHANGE UP (ref 3.5–5.3)
SODIUM SERPL-SCNC: 136 MMOL/L — SIGNIFICANT CHANGE UP (ref 135–145)
SPECIMEN SOURCE: SIGNIFICANT CHANGE UP
SPECIMEN SOURCE: SIGNIFICANT CHANGE UP

## 2025-04-11 PROCEDURE — 99232 SBSQ HOSP IP/OBS MODERATE 35: CPT | Mod: FS

## 2025-04-11 RX ADMIN — LIDOCAINE HYDROCHLORIDE 3 PATCH: 20 JELLY TOPICAL at 22:40

## 2025-04-11 RX ADMIN — POLYETHYLENE GLYCOL 3350 17 GRAM(S): 17 POWDER, FOR SOLUTION ORAL at 11:40

## 2025-04-11 RX ADMIN — Medication 1000 MILLIGRAM(S): at 13:41

## 2025-04-11 RX ADMIN — Medication 1000 MILLIGRAM(S): at 22:32

## 2025-04-11 RX ADMIN — LIDOCAINE HYDROCHLORIDE 3 PATCH: 20 JELLY TOPICAL at 00:00

## 2025-04-11 RX ADMIN — Medication 25 GRAM(S): at 17:11

## 2025-04-11 RX ADMIN — Medication 1 DOSE(S): at 21:36

## 2025-04-11 RX ADMIN — Medication 1000 MILLIGRAM(S): at 06:02

## 2025-04-11 RX ADMIN — Medication 1000 MILLIGRAM(S): at 14:18

## 2025-04-11 RX ADMIN — LIDOCAINE HYDROCHLORIDE 3 PATCH: 20 JELLY TOPICAL at 19:39

## 2025-04-11 RX ADMIN — AMLODIPINE BESYLATE 5 MILLIGRAM(S): 10 TABLET ORAL at 05:24

## 2025-04-11 RX ADMIN — HEPARIN SODIUM 5000 UNIT(S): 1000 INJECTION INTRAVENOUS; SUBCUTANEOUS at 21:36

## 2025-04-11 RX ADMIN — Medication 1 DOSE(S): at 11:39

## 2025-04-11 RX ADMIN — HEPARIN SODIUM 5000 UNIT(S): 1000 INJECTION INTRAVENOUS; SUBCUTANEOUS at 13:41

## 2025-04-11 RX ADMIN — Medication 1000 MILLIGRAM(S): at 05:24

## 2025-04-11 RX ADMIN — HEPARIN SODIUM 5000 UNIT(S): 1000 INJECTION INTRAVENOUS; SUBCUTANEOUS at 05:24

## 2025-04-11 RX ADMIN — Medication 25 GRAM(S): at 23:58

## 2025-04-11 RX ADMIN — LIDOCAINE HYDROCHLORIDE 3 PATCH: 20 JELLY TOPICAL at 11:40

## 2025-04-11 RX ADMIN — Medication 88 MICROGRAM(S): at 05:24

## 2025-04-11 RX ADMIN — Medication 1000 MILLIGRAM(S): at 21:35

## 2025-04-11 RX ADMIN — Medication 2 TABLET(S): at 21:36

## 2025-04-11 RX ADMIN — Medication 25 GRAM(S): at 08:13

## 2025-04-11 NOTE — PROGRESS NOTE ADULT - SUBJECTIVE AND OBJECTIVE BOX
Patient is a 89y old  Female who presents with a chief complaint of fall (10 Apr 2025 16:40)    pt seen in icu [  ], reg med floor [ x  ], bed [x  ], chair at bedside [   ], a+o x3 [x  ], lethargic [  ],  nad [ x ]      Allergies    No Known Allergies        Vitals    T(F): 97 (04-11-25 @ 05:13), Max: 97.5 (04-10-25 @ 13:35)  HR: 73 (04-11-25 @ 05:13) (60 - 86)  BP: 110/58 (04-11-25 @ 05:13) (110/58 - 130/78)  RR: 16 (04-11-25 @ 05:13) (16 - 17)  SpO2: 95% (04-11-25 @ 05:13) (93% - 95%)  Wt(kg): --  CAPILLARY BLOOD GLUCOSE          Labs                          11.9   7.85  )-----------( 197      ( 10 Apr 2025 05:30 )             37.8       04-11    136  |  101  |  21[H]  ----------------------------<  87  4.2   |  30  |  1.00    Ca    9.4      11 Apr 2025 05:20  Phos  3.0     04-10  Mg     1.8     04-11              Blood Blood  04-06 @ 11:55   No growth at 4 days  --  --      Blood Blood  04-06 @ 11:45   No growth at 4 days  --  --          Radiology Results    < from: MR Head w/wo IV Cont (04.10.25 @ 15:11) >  FINDINGS:    Motion artifact limits interpretation.    A questionable small left frontal encephalocele extending into the left   ethmoid sinus and slightly protruding into the left superior medial orbit   (15:5).    There is no evidence of acute infarct. There are no foci of   susceptibility artifact tosuggest hemorrhage. Scattered foci of T2/FLAIR   hyperintensity in the bilateral hemispheric white matter are nonspecific   but likely related to chronic white matter microvascular ischemic   disease. The ventricles are normal in size for patient's age.    Flow voids of the major intracranial vessels at the skull base follow   expected course and contour.    Mucosal thickening causing near complete opacification of the left   sphenoid sinus. Trace fluid within the right sphenoid sinus. The mastoids   demonstrate no signal abnormality.  Bilateral orbits are within normal   limits.      IMPRESSION:  Questionable small left frontal encephalocele, as noted on recent CT.   Given the size and proximity of the possible defect to the superior   medial left orbit, would recommend further evaluation with an MRI and CT   of the orbits and thin cuts..    < end of copied text >        < from: MR Hip No Cont, Left (04.10.25 @ 15:06) >  FINDINGS: There is a nondisplaced subcapital left femoral neck fracture   with extension into the anterior aspect of the femoral head. There is   adjacent marrow edema. There are no other fractures. There is mild left   hip osteoarthritis. There is a small left hip joint effusion. There are   no acute high-grade or full-thickness tendon or muscle tears. There is   mild edema in the left adductor muscles. There is no muscle atrophy.    IMPRESSION: Acute nondisplaced fracture of the left femoral head/left   subcapital femoral neck.    < end of copied text >        Meds    MEDICATIONS  (STANDING):  acetaminophen     Tablet .. 1000 milliGRAM(s) Oral every 8 hours  amLODIPine   Tablet 5 milliGRAM(s) Oral daily  fluticasone propionate/ salmeterol 100-50 MICROgram(s) Diskus 1 Dose(s) Inhalation two times a day  heparin   Injectable 5000 Unit(s) SubCutaneous every 8 hours  levothyroxine 88 MICROGram(s) Oral daily  lidocaine   4% Patch 3 Patch Transdermal daily  naloxone Injectable 0.4 milliGRAM(s) IV Push once  piperacillin/tazobactam IVPB.. 3.375 Gram(s) IV Intermittent every 8 hours  polyethylene glycol 3350 17 Gram(s) Oral daily  senna 2 Tablet(s) Oral at bedtime      MEDICATIONS  (PRN):  albuterol    90 MICROgram(s) HFA Inhaler 2 Puff(s) Inhalation every 6 hours PRN Shortness of Breath and/or Wheezing  bisacodyl 5 milliGRAM(s) Oral daily PRN Constipation  ondansetron Injectable 4 milliGRAM(s) IV Push every 8 hours PRN Nausea and/or Vomiting  oxyCODONE    IR 2.5 milliGRAM(s) Oral every 4 hours PRN Moderate Pain (4 - 6)  oxyCODONE    IR 5 milliGRAM(s) Oral every 4 hours PRN Severe Pain (7 - 10)      Physical Exam    Neuro :  no focal deficits  Respiratory: CTA B/L  CV: RRR, S1S2, no murmurs,   Abdominal: Soft, NT, ND +BS,  Extremities: No edema, + peripheral pulses    ASSESSMENT    left femoral neck fx,   bronchiectasis with karen,   licha,    demand ischemia   left frontal encephalocele,   sinusitis   h/o HTN,   hypothyroidism,   COPD,   ?dementia       PLAN    cont pain control,   pain mgmt f/u   Opioid pain recommendations   - Continue Oxycodone 2.5/5 mg PO q4h PRN moderate/severe pain. Monitor for sedation/ respiratory depression.   Non-opioid pain recommendations   - Continue Acetaminophen 1 gram PO q 8 hours x 4 days. Monitor LFT's  - Continue Lidocaine 4% patches x 3 daily.  Bowel Regimen  - Continue Miralax 17G PO daily  - Continue Senna 2 tablets at bedtime for constipation  Mild pain   - Non-pharmacological pain treatment recommendations  - Warm/ Cool packs PRN   - Repositioning extremity, guided imagery, relaxation, distraction.  - Physical therapy OOB if no contraindications   mri left hip with Acute nondisplaced fracture of the left femoral head/left   subcapital femoral neck noted above.    ortho f/u       >  If MRI of hip shows acute fracture will have another discussion with HCP   and reconsider surgical intervention  -  DVT prophylaxis with heparin and Venodynes  -  Daily PT: PWB to LLE at this time, OOB with assistive device   phys tx eval   id f/u   Continue Zosyn to treat sinusitis while inpatient, may change to oral Augmentin 875 mg q 12hours on discharge to continue until 4/19/25  Sputum for AFB stain and culture X 3 if possible  pulm f/u    Bronchodilators  Budesonide 0.25 mgs one unit  dose neb BID  Montelukast 10 mgs po Qhs.  serum creat wnl   potassium  wnl  phos wnl  trop trending down   cardio f/u   cont norvasc   f/u echo   mri with Questionable small left frontal encephalocele, as noted on recent CT.   Given the size and proximity of the possible defect to the superior   medial left orbit, would recommend further evaluation with an MRI and CT   of the orbits and thin cuts noted above.    neuro f/u  tsh elevated 20.5  ft4 1.1   endo f/u  cont lt4 to 88 mcg  tfts in 4-6 wks  cont current meds   d/c pending neuro and ortho clr

## 2025-04-11 NOTE — PROGRESS NOTE ADULT - ASSESSMENT
89 y/o F, Ivorian speaking from home, ambulates with cane, PMH HTN, hypothyroidism, COPD, dementia. Presenting after fall at home ; was found by Niece on the floor.    CT HEAD: Small LEFT frontal encephalocele.   CT C/A/P: impaction fracture of LEFT femoral neck. Bronchiectasis and peribronchial nodularity suspicious for ROWDY infection.   SCr 1.53. Trop 109, proBNP 3k.   Admitted for mechanical fall, bronchiectasis exacerbation, ROWDY workup, and MONO..     Troponin downtrended to 90, Cardiology Dr. Richmond following, echo with EF 51%, mild grade 1 diastolic dysfunction.   Mono resolved.   Ortho following rec MRI left hip - showing Acute nondisplaced fracture of the left femoral head/left   subcapital femoral neck.   Orthopedics and Niece both agree for conservative management.   Neuro Dr. Velez following, MRI brain w/wo IV contrast to further eval incidental finding   encephalocele - shows stability, no signs of infection, no further imaging needed.   ID Dr. Romo consulted for concern of ROWDY infection, unable to collect sputum cultures, continue Zosyn while inpatient and change to Augmentin 875 mg q 12hours on discharge until 4/19/25.   Awaiting PT eval. Dispo planning for [facility].

## 2025-04-11 NOTE — PHYSICAL THERAPY INITIAL EVALUATION ADULT - PERTINENT HX OF CURRENT PROBLEM, REHAB EVAL
Pt was admitted on 4/6/25 for fall. MRI L hip shows acute nondisplaced fx of L Femoral head/L Subcapital femoral neck. Per chart, HCP refuses surgical intervention and wants conservative management of fx.

## 2025-04-11 NOTE — PROGRESS NOTE ADULT - PROBLEM SELECTOR PLAN 1
Pt with acute left hip pain which is somatic in nature due to left hip fracture s/p mechanical fall. GOHCO. On CT C/A/P: impaction fracture of Left Hip Femoral Neck Fracture noted. MRI left hip- showing Acute nondisplaced fracture of the left femoral head/left subcapital femoral neck. Ortho/Neurology following. Per chart notes, Family opting for conservative mgmt. High risk medications reviewed. Avoid polypharmacy. Avoid IV opioids. Avoid NSAIDs and benzodiazepines. Non-pharmacological sleep aides initiated. Non-opioid medications and non-pharmacological pain management measures initiated.   Opioid pain recommendations   - Continue Oxycodone 2.5/5 mg PO q4h PRN moderate/severe pain. Monitor for sedation/ respiratory depression.   Non-opioid pain recommendations   - Continue Acetaminophen 1 gram PO q 8 hours x 4 days. Monitor LFT's  - Continue Lidocaine 4% patches x 3 daily.  Bowel Regimen  - Continue Miralax 17G PO daily  - Continue Senna 2 tablets at bedtime for constipation  Mild pain   - Non-pharmacological pain treatment recommendations  - Warm/ Cool packs PRN   - Repositioning extremity, guided imagery, relaxation, distraction.  - Physical therapy OOB if no contraindications   Recommendations discussed with primary team and RN. Pt with acute left hip pain which is somatic in nature due to left hip fracture s/p mechanical fall. GOHCO. On CT C/A/P: impaction fracture of Left Hip Femoral Neck Fracture noted. MRI left hip- showing Acute nondisplaced fracture of the left femoral head/left subcapital femoral neck. Ortho/Neurology following. Per chart notes, Family opting for conservative mgmt. High risk medications reviewed. Avoid polypharmacy. Avoid IV opioids. Avoid NSAIDs and benzodiazepines. Non-pharmacological sleep aides initiated. Non-opioid medications and non-pharmacological pain management measures initiated.   Opioid pain recommendations   - Continue Oxycodone 2.5/5 mg PO q4h PRN moderate/severe pain. Monitor for sedation/ respiratory depression.   Non-opioid pain recommendations   - Continue Acetaminophen 1 gram PO q 8 hours x 4 days. Monitor LFT's  - Continue Lidocaine 4% patches x 3 daily.  Bowel Regimen  - Continue Miralax 17G PO daily  - Continue Senna 2 tablets at bedtime for constipation  Mild pain   - Non-pharmacological pain treatment recommendations  - Warm/ Cool packs PRN   - Repositioning extremity, guided imagery, relaxation, distraction.  - Physical therapy OOB if no contraindications   Recommendations discussed with primary team and RN.    **** Pain team will sign off at this time, please reconsult if needed.****

## 2025-04-11 NOTE — PROGRESS NOTE ADULT - ASSESSMENT
89F, from home, ambulates with cane, PMH HTN, hypothyroidism, COPD, ?dementia. Presenting after fall at home,Lt hip fx,pneumonia.  1.Echocardiogram.  2.MR-Lt hip as above,HCP declining surgery.  3.HTN-norvasc.  4.Hypothyroid-synthroid.  5.COPD-management as per Pulm.  6.Pneumonia-abx.  7.GI and DVT prophylaxis.

## 2025-04-11 NOTE — PROGRESS NOTE ADULT - ASSESSMENT
Patient is a 89y old  Female who is from home, ambulates with cane, and PMH of HTN, hypothyroidism, COPD, ?dementia. now Presents to the ER for evaluation of A fall at home, was found by HHA on the floor. Unknown down time. In ER, patient AAOx2 (to self, place). States that she had a fall while attempting to stand up, fell and landed on her side. Denies head trauma. On admission, she found to have no fever but Subcapital fracture of the left hip and Bilateral sphenoid sinus disease.  The CT chest shows Bronchiectasis, hence the ID consult requested to assist with further evaluation and rule out ROWDY.    # Bilateral sphenoid sinusitis  # Bronchiectasis, R/O ROWDY - unable to  produce Sputum even with " sputum Induction" by Respiratory team and  Family does not want any invasive procedure such as Bronchodcopy  # Subcapital fracture of the left hip after  A fall    would recommend:    1. PT/ OOB to chair  2. Continue Zosyn to treat sinusitis while inpatient, may change to oral Augmentin 875 mg q 12hours on discharge to continue until 4/19/25  3. Sputum for AFB stain and culture X 3 if possible  4. Management of  Subcapital fracture of the left hip as per Ortho team    d/w Covering NP, Ray and Nursing staff    Attending Attestation:    Spent more than 35 minutes on total encounter, more than 50 % of the visit was spent counseling and/or coordinating care by the Attending physician.       Patient is a 89y old  Female who is from home, ambulates with cane, and PMH of HTN, hypothyroidism, COPD, ?dementia. now Presents to the ER for evaluation of A fall at home, was found by HHA on the floor. Unknown down time. In ER, patient AAOx2 (to self, place). States that she had a fall while attempting to stand up, fell and landed on her side. Denies head trauma. On admission, she found to have no fever but Subcapital fracture of the left hip and Bilateral sphenoid sinus disease.  The CT chest shows Bronchiectasis, hence the ID consult requested to assist with further evaluation and rule out ROWDY.    # Bilateral sphenoid sinusitis  # Bronchiectasis, R/O ROWDY - unable to  produce Sputum even with " sputum Induction" by Respiratory team and  Family does not want any invasive procedure such as Bronchoscopy   # Subcapital fracture of the left hip after  A fall - Patient's HCP refusing surgical intervention of  Subcapital fracture of the left hip.    would recommend:    1. PT/ OOB to chair  2. Continue Zosyn to treat sinusitis while inpatient, may change to oral Augmentin 875 mg q 12hours on discharge to continue until 4/19/25  3. Pain Management as needed    d/w Covering NP, Ray and Nursing staff    Attending Attestation:    Spent more than 35 minutes on total encounter, more than 50 % of the visit was spent counseling and/or coordinating care by the Attending physician.

## 2025-04-11 NOTE — PHYSICAL THERAPY INITIAL EVALUATION ADULT - TRANSFER SAFETY CONCERNS NOTED: BED/CHAIR, REHAB EVAL
losing balance/stand pivot/decreased step length/inability to maintain weight-bearing restrictions w/o assist/decreased weight-shifting ability

## 2025-04-11 NOTE — PROGRESS NOTE ADULT - SUBJECTIVE AND OBJECTIVE BOX
Patient is a 89y old  Female who presents with a chief complaint of fall (11 Apr 2025 14:06)    OVERNIGHT EVENTS: no acute changes.     Pt is awake, confused, tolerating PO, bedbound.     REVIEW OF SYSTEMS:  limited exam due to mental status  denies any pain     T(C): 36.2 (04-11-25 @ 12:20), Max: 36.4 (04-10-25 @ 20:10)  HR: 70 (04-11-25 @ 12:20) (70 - 86)  BP: 113/66 (04-11-25 @ 12:20) (110/58 - 130/78)  RR: 18 (04-11-25 @ 12:20) (16 - 18)  SpO2: 94% (04-11-25 @ 12:20) (94% - 95%)  Wt(kg): --Vital Signs Last 24 Hrs  T(C): 36.2 (11 Apr 2025 12:20), Max: 36.4 (10 Apr 2025 20:10)  T(F): 97.2 (11 Apr 2025 12:20), Max: 97.5 (10 Apr 2025 20:10)  HR: 70 (11 Apr 2025 12:20) (70 - 86)  BP: 113/66 (11 Apr 2025 12:20) (110/58 - 130/78)  BP(mean): 81 (11 Apr 2025 12:20) (81 - 95)  RR: 18 (11 Apr 2025 12:20) (16 - 18)  SpO2: 94% (11 Apr 2025 12:20) (94% - 95%)    Parameters below as of 11 Apr 2025 12:20  Patient On (Oxygen Delivery Method): room air    MEDICATIONS  (STANDING):  acetaminophen     Tablet .. 1000 milliGRAM(s) Oral every 8 hours  amLODIPine   Tablet 5 milliGRAM(s) Oral daily  fluticasone propionate/ salmeterol 100-50 MICROgram(s) Diskus 1 Dose(s) Inhalation two times a day  heparin   Injectable 5000 Unit(s) SubCutaneous every 8 hours  levothyroxine 88 MICROGram(s) Oral daily  lidocaine   4% Patch 3 Patch Transdermal daily  naloxone Injectable 0.4 milliGRAM(s) IV Push once  piperacillin/tazobactam IVPB.. 3.375 Gram(s) IV Intermittent every 8 hours  polyethylene glycol 3350 17 Gram(s) Oral daily  senna 2 Tablet(s) Oral at bedtime    MEDICATIONS  (PRN):  albuterol    90 MICROgram(s) HFA Inhaler 2 Puff(s) Inhalation every 6 hours PRN Shortness of Breath and/or Wheezing  bisacodyl 5 milliGRAM(s) Oral daily PRN Constipation  ondansetron Injectable 4 milliGRAM(s) IV Push every 8 hours PRN Nausea and/or Vomiting  oxyCODONE    IR 2.5 milliGRAM(s) Oral every 4 hours PRN Moderate Pain (4 - 6)  oxyCODONE    IR 5 milliGRAM(s) Oral every 4 hours PRN Severe Pain (7 - 10)      PHYSICAL EXAM:  GENERAL: NAD  EYES: clear conjunctiva; EOMI  ENMT: Moist mucous membranes  NECK: Supple, No JVD, Normal thyroid  CHEST/LUNG: Clear to auscultation bilaterally; No rales, rhonchi, wheezing, or rubs  HEART: S1, S2, Regular rate and rhythm  ABDOMEN: Soft, Nontender, Nondistended; Bowel sounds present  NEURO: Alert & Oriented X3  EXTREMITIES: No LE edema, no calf tenderness  LYMPH: No lymphadenopathy noted  SKIN: No rashes or lesions    Consultant(s) Notes Reviewed:  [x ] YES  [ ] NO  Care Discussed with Consultants/Other Providers [ x] YES  [ ] NO    LABS:                        11.9   7.85  )-----------( 197      ( 10 Apr 2025 05:30 )             37.8     04-11    136  |  101  |  21[H]  ----------------------------<  87  4.2   |  30  |  1.00    Ca    9.4      11 Apr 2025 05:20  Phos  3.0     04-10  Mg     1.8     04-11        CAPILLARY BLOOD GLUCOSE            Urinalysis Basic - ( 11 Apr 2025 05:20 )    Color: x / Appearance: x / SG: x / pH: x  Gluc: 87 mg/dL / Ketone: x  / Bili: x / Urobili: x   Blood: x / Protein: x / Nitrite: x   Leuk Esterase: x / RBC: x / WBC x   Sq Epi: x / Non Sq Epi: x / Bacteria: x        RADIOLOGY & ADDITIONAL TESTS:  < from: MR Head w/wo IV Cont (04.10.25 @ 15:11) >    ACC: [id number] EXAM:  MR BRAIN WAW IC   ORDERED BY: AN NYE     PROCEDURE DATE:  04/10/2025          INTERPRETATION:  CLINICAL INFORMATION: LEFT frontal encephalocele.. HMR.   Admitting Dxs: S72.009A FRACTURE OF UNSP PART OF NECK OF UNSP FEMUR, INIT.    TECHNIQUE: Multiplanar, multisequence brain MRI was performed following   the administration of 5 ml Gadavist intravenous contrast with 5 ml   discarded.    COMPARISON: CT head 4/6/2025.    FINDINGS:    Motion artifact limits interpretation.    A questionable small left frontal encephalocele extending into the left   ethmoid sinus and slightly protruding into the left superior medial orbit   (15:5).    There is no evidence of acute infarct. There are no foci of   susceptibility artifact tosuggest hemorrhage. Scattered foci of T2/FLAIR   hyperintensity in the bilateral hemispheric white matter are nonspecific   but likely related to chronic white matter microvascular ischemic   disease. The ventricles are normal in size for patient's age.    Flow voids of the major intracranial vessels at the skull base follow   expected course and contour.    Mucosal thickening causing near complete opacification of the left   sphenoid sinus. Trace fluid within the right sphenoid sinus. The mastoids   demonstrate no signal abnormality.  Bilateral orbits are within normal   limits.      IMPRESSION:  Questionable small left frontal encephalocele, as noted on recent CT.   Given the size and proximity of the possible defect to the superior   medial left orbit, would recommend further evaluation with an MRI and CT   of the orbits and thin cuts..    --- End of Report ---          SAMUEL THAYER MD; Resident Radiologist  This document has been electronically signed.  SHEILA MENON MD; Attending Radiologist  This document has been electronically signed. Apr 10 2025  9:06PM    < end of copied text >  < from: MR Hip No Cont, Left (04.10.25 @ 15:06) >    ACC: [id number] EXAM:  MR HIP LT   ORDERED BY: AN NYE     PROCEDURE DATE:  04/10/2025          INTERPRETATION:  EXAMINATION: MRI left hip without contrast    CLINICAL INFORMATION: Left hip pain    TECHNIQUE: Multiplanar, multisequential MR imaging was performed.    FINDINGS: There is a nondisplaced subcapital left femoral neck fracture   with extension into the anterior aspect of the femoral head. There is   adjacent marrow edema. There are no other fractures. There is mild left   hip osteoarthritis. There is a small left hip joint effusion. There are   no acute high-grade or full-thickness tendon or muscle tears. There is   mild edema in the left adductor muscles. There is no muscle atrophy.    IMPRESSION: Acute nondisplaced fracture of the left femoral head/left   subcapital femoral neck.    --- End of Report ---            JENNIFER SHRESTHA MD; Attending Radiologist  This document has been electronically signed. Apr 10 2025  3:18PM    < end of copied text >      Imaging Personally Reviewed:  [ ] YES  [ ] NO

## 2025-04-11 NOTE — PROGRESS NOTE ADULT - ASSESSMENT
Confidential Drug Utilization Report  Search Terms: Christiana Romeo, 1935Search Date: 04/09/2025 16:13:42 PM  The Drug Utilization Report below displays all of the controlled substance prescriptions, if any, that your patient has filled in the last twelve months. The information displayed on this report is compiled from pharmacy submissions to the Department, and accurately reflects the information as submitted by the pharmacies.    This report was requested by: Vika Denson | Reference #: [id number]    There are no results for the search terms that you entered.

## 2025-04-11 NOTE — PROGRESS NOTE ADULT - SUBJECTIVE AND OBJECTIVE BOX
Patient is a 89y old  Female who presents with a chief complaint of fall (11 Apr 2025 06:39)  Awake, alert, comfortable in bed in NAD. Remains on RA    INTERVAL HPI/OVERNIGHT EVENTS:      VITAL SIGNS:  T(F): 97 (04-11-25 @ 05:13)  HR: 73 (04-11-25 @ 05:13)  BP: 110/58 (04-11-25 @ 05:13)  RR: 16 (04-11-25 @ 05:13)  SpO2: 95% (04-11-25 @ 05:13)  Wt(kg): --  I&O's Detail          REVIEW OF SYSTEMS:    CONSTITUTIONAL:  No fevers, chills, sweats    HEENT:  Eyes:  No diplopia or blurred vision. ENT:  No earache, sore throat or runny nose.    CARDIOVASCULAR:  No pressure, squeezing, tightness, or heaviness about the chest; no palpitations.    RESPIRATORY:  Per HPI    GASTROINTESTINAL:  No abdominal pain, nausea, vomiting or diarrhea.    GENITOURINARY:  No dysuria, frequency or urgency.    NEUROLOGIC:  No paresthesias, fasciculations, seizures or weakness.    PSYCHIATRIC:  No disorder of thought or mood.      PHYSICAL EXAM:    Constitutional: Well developed and nourished  Eyes:Perrla  ENMT: normal  Neck:supple  Respiratory: good air entry  Cardiovascular: S1 S2 regular  Gastrointestinal: Soft, Non tender  Extremities: No edema  Vascular:normal  Neurological:Awake, alert  Musculoskeletal:Normal      MEDICATIONS  (STANDING):  acetaminophen     Tablet .. 1000 milliGRAM(s) Oral every 8 hours  amLODIPine   Tablet 5 milliGRAM(s) Oral daily  fluticasone propionate/ salmeterol 100-50 MICROgram(s) Diskus 1 Dose(s) Inhalation two times a day  heparin   Injectable 5000 Unit(s) SubCutaneous every 8 hours  levothyroxine 88 MICROGram(s) Oral daily  lidocaine   4% Patch 3 Patch Transdermal daily  naloxone Injectable 0.4 milliGRAM(s) IV Push once  piperacillin/tazobactam IVPB.. 3.375 Gram(s) IV Intermittent every 8 hours  polyethylene glycol 3350 17 Gram(s) Oral daily  senna 2 Tablet(s) Oral at bedtime    MEDICATIONS  (PRN):  albuterol    90 MICROgram(s) HFA Inhaler 2 Puff(s) Inhalation every 6 hours PRN Shortness of Breath and/or Wheezing  bisacodyl 5 milliGRAM(s) Oral daily PRN Constipation  ondansetron Injectable 4 milliGRAM(s) IV Push every 8 hours PRN Nausea and/or Vomiting  oxyCODONE    IR 2.5 milliGRAM(s) Oral every 4 hours PRN Moderate Pain (4 - 6)  oxyCODONE    IR 5 milliGRAM(s) Oral every 4 hours PRN Severe Pain (7 - 10)      Allergies    No Known Allergies    Intolerances        LABS:                        11.9   7.85  )-----------( 197      ( 10 Apr 2025 05:30 )             37.8     04-11    136  |  101  |  21[H]  ----------------------------<  87  4.2   |  30  |  1.00    Ca    9.4      11 Apr 2025 05:20  Phos  3.0     04-10  Mg     1.8     04-11        Urinalysis Basic - ( 11 Apr 2025 05:20 )    Color: x / Appearance: x / SG: x / pH: x  Gluc: 87 mg/dL / Ketone: x  / Bili: x / Urobili: x   Blood: x / Protein: x / Nitrite: x   Leuk Esterase: x / RBC: x / WBC x   Sq Epi: x / Non Sq Epi: x / Bacteria: x            CAPILLARY BLOOD GLUCOSE            RADIOLOGY & ADDITIONAL TESTS:  < from: MR Hip No Cont, Left (04.10.25 @ 15:06) >  FINDINGS: There is a nondisplaced subcapital left femoral neck fracture   with extension into the anterior aspect of the femoral head. There is   adjacent marrow edema. There are no other fractures. There is mild left   hip osteoarthritis. There is a small left hip joint effusion. There are   no acute high-grade or full-thickness tendon or muscle tears. There is   mild edema in the left adductor muscles. There is no muscle atrophy.    IMPRESSION: Acute nondisplaced fracture of the left femoral head/left   subcapital femoral neck.    --- End of Report ---    < end of copied text >  < from: MR Head w/wo IV Cont (04.10.25 @ 15:11) >  IMPRESSION:  Questionable small left frontal encephalocele, as noted on recent CT.   Given the size and proximity of the possible defect to the superior   medial left orbit, would recommend further evaluation with an MRI and CT   of the orbits and thin cuts..    --- End of Report ---    < end of copied text >    CXR:    Ct scan chest:    ekg;    echo:

## 2025-04-11 NOTE — CHART NOTE - NSCHARTNOTEFT_GEN_A_CORE
Updated plan after MRI results of Left Hip     Radiology:  < from: MR Hip No Cont, Left (04.10.25 @ 15:06) >      ACC: [id number] EXAM:  MR HIP LT   ORDERED BY: AN NYE     PROCEDURE DATE:  04/10/2025          INTERPRETATION:  EXAMINATION: MRI left hip without contrast    CLINICAL INFORMATION: Left hip pain    TECHNIQUE: Multiplanar, multisequential MR imaging was performed.    FINDINGS: There is a nondisplaced subcapital left femoral neck fracture   with extension into the anterior aspect of the femoral head. There is   adjacent marrow edema. There are no other fractures. There is mild left   hip osteoarthritis. There is a small left hip joint effusion. There are   no acute high-grade or full-thickness tendon or muscle tears. There is   mild edema in the left adductor muscles. There is no muscle atrophy.    IMPRESSION: Acute nondisplaced fracture of the left femoral head/left   subcapital femoral neck.    --- End of Report ---            JENNIFER SHRESTHA MD; Attending Radiologist  This document has been electronically signed. Apr 10 2025  3:18PM    < end of copied text >      PLAN PENDING FINAL ATTENDING RECOMMENDATIONS     Plan:  -  MRI results for L Hip reviewed by myself and Dr.  -  HCP patients niece Nataly informed of risks and benefits of surgical vs conservative management, all questions and concerns answered. HCP refusing surgical intervention.   -  Recommendation: Conservative management , no surgical orthopedic intervention is recommended at this time.  -  DVT prophylaxis with heparin and Venodynes  -  Daily PT: PWB to LLE at this time, OOB with assistive device  -  Case d/w

## 2025-04-11 NOTE — PROGRESS NOTE ADULT - SUBJECTIVE AND OBJECTIVE BOX
Source of information: [facility] SR, Chart review  Patient language: Sammarinese  : n/a    HPI:  89F, from home, ambulates with cane, PMH HTN, hypothyroidism, COPD, ?dementia. Presenting after fall at home ; was found by HHA on the floor. Unknown down time. At the time of exam, patient AAOx2 (to self, place). Does not appear a great historian. States that she had a fall. States she was attempting to stand up when she fell and landed on her side. Denies head trauma. However, not able to clarify if she had palpitations, dizziness, or other prodromal sx prior to the fall. Endorses mildly productive cough >1 week, however unable to quantify exactly how long. Denies fever, chills, chest pain, abd pain, SOB, dysuria.  (2025 16:01)    MRI-Left hip  IMPRESSION: Acute nondisplaced fracture of the left femoral head/left   subcapital femoral neck.    Pt is admitted for s/p mechanical fall. [facility]. On CT C/A/P: impaction fracture of Left Hip Femoral Neck Fracture noted. MRI left hip- showing Acute nondisplaced fracture of the left femoral head/left subcapital femoral neck. Ortho/Neurology following. Pain consulted for left hip pain . Pt seen and examined at bedside this morning. Pt found laying up in bed, sleeping, when call by name open eyes, alert and oriented to self, re-oriented x place and time. Pt Hx of Dementia. Pt is Sammarinese speaking, no  needed at this time. When asked pain, pt replies "some pain on left leg", no acute or facial grimacing noted. Per chart notes, Family opting for conservative mgmt.    PAST MEDICAL & SURGICAL HISTORY:  HTN (hypertension)    Hypothyroidism    Chronic obstructive pulmonary disease (COPD)    S/P  section    FAMILY HISTORY:    Social History:   [x]Denies ETOH use, illicit drug use, and smoking     Allergies    No Known Allergies    Intolerances    MEDICATIONS  (STANDING):  acetaminophen     Tablet .. 1000 milliGRAM(s) Oral every 8 hours  amLODIPine   Tablet 5 milliGRAM(s) Oral daily  fluticasone propionate/ salmeterol 100-50 MICROgram(s) Diskus 1 Dose(s) Inhalation two times a day  heparin   Injectable 5000 Unit(s) SubCutaneous every 8 hours  levothyroxine 88 MICROGram(s) Oral daily  lidocaine   4% Patch 3 Patch Transdermal daily  naloxone Injectable 0.4 milliGRAM(s) IV Push once  piperacillin/tazobactam IVPB.. 3.375 Gram(s) IV Intermittent every 8 hours  polyethylene glycol 3350 17 Gram(s) Oral daily  senna 2 Tablet(s) Oral at bedtime    MEDICATIONS  (PRN):  albuterol    90 MICROgram(s) HFA Inhaler 2 Puff(s) Inhalation every 6 hours PRN Shortness of Breath and/or Wheezing  bisacodyl 5 milliGRAM(s) Oral daily PRN Constipation  ondansetron Injectable 4 milliGRAM(s) IV Push every 8 hours PRN Nausea and/or Vomiting  oxyCODONE    IR 2.5 milliGRAM(s) Oral every 4 hours PRN Moderate Pain (4 - 6)  oxyCODONE    IR 5 milliGRAM(s) Oral every 4 hours PRN Severe Pain (7 - 10)    Vital Signs Last 24 Hrs  T(C): 36.2 (2025 12:20), Max: 36.4 (10 Apr 2025 20:10)  T(F): 97.2 (2025 12:20), Max: 97.5 (10 Apr 2025 20:10)  HR: 70 (2025 12:20) (70 - 86)  BP: 113/66 (2025 12:20) (110/58 - 130/78)  BP(mean): 81 (2025 12:20) (81 - 95)  RR: 18 (2025 12:20) (16 - 18)  SpO2: 94% (2025 12:20) (94% - 95%)    Parameters below as of 2025 12:20  Patient On (Oxygen Delivery Method): room air    LABS: Reviewed                        11.9   7.85  )-----------( 197      ( 10 Apr 2025 05:30 )             37.8     04-11    136  |  101  |  21[H]  ----------------------------<  87  4.2   |  30  |  1.00    Ca    9.4      2025 05:20  Phos  3.0     04-10  Mg     1.8     04-11    Urinalysis Basic - ( 2025 05:20 )    Color: x / Appearance: x / SG: x / pH: x  Gluc: 87 mg/dL / Ketone: x  / Bili: x / Urobili: x   Blood: x / Protein: x / Nitrite: x   Leuk Esterase: x / RBC: x / WBC x   Sq Epi: x / Non Sq Epi: x / Bacteria: x    CAPILLARY BLOOD GLUCOSE    Radiology: Reviewed  ACC: [id number] EXAM:  MR HIP LT   ORDERED BY: AN NYE     PROCEDURE DATE:  04/10/2025      INTERPRETATION:  EXAMINATION: MRI left hip without contrast    CLINICAL INFORMATION: Left hip pain    TECHNIQUE: Multiplanar, multisequential MR imaging was performed.    FINDINGS: There is a nondisplaced subcapital left femoral neck fracture   with extension into the anterior aspect of the femoral head. There is   adjacent marrow edema. There are no other fractures. There is mild left   hip osteoarthritis. There is a small left hip joint effusion. There are   no acute high-grade or full-thickness tendon or muscle tears. There is   mild edema in the left adductor muscles. There is no muscle atrophy.    IMPRESSION: Acute nondisplaced fracture of the left femoral head/left   subcapital femoral neck.    --- End of Report ---    JENNIFER SHRESTHA MD; Attending Radiologist  This document has been electronically signed. Apr 10 2025  3:18PM  ACC: [id number] EXAM:  MR BRAIN WAW IC   ORDERED BY: AN NYE     PROCEDURE DATE:  04/10/2025      INTERPRETATION:  CLINICAL INFORMATION: LEFT frontal encephalocele.. HMR.   Admitting Dxs: S72.009A FRACTURE OF UNSP PART OF NECK OF UNSP FEMUR, INIT.    TECHNIQUE: Multiplanar, multisequence brain MRI was performed following   the administration of 5 ml Gadavist intravenous contrast with 5 ml   discarded.    COMPARISON: CT head 2025.    FINDINGS:    Motion artifact limits interpretation.    A questionable small left frontal encephalocele extending into the left   ethmoid sinus and slightly protruding into the left superior medial orbit   (15:5).    There is no evidence of acute infarct. There are no foci of   susceptibility artifact tosuggest hemorrhage. Scattered foci of T2/FLAIR   hyperintensity in the bilateral hemispheric white matter are nonspecific   but likely related to chronic white matter microvascular ischemic   disease. The ventricles are normal in size for patient's age.    Flow voids of the major intracranial vessels at the skull base follow   expected course and contour.    Mucosal thickening causing near complete opacification of the left   sphenoid sinus. Trace fluid within the right sphenoid sinus. The mastoids   demonstrate no signal abnormality.  Bilateral orbits are within normal   limits.    IMPRESSION:  Questionable small left frontal encephalocele, as noted on recent CT.   Given the size and proximity of the possible defect to the superior   medial left orbit, would recommend further evaluation with an MRI and CT   of the orbits and thin cuts..    --- End of Report ---    SAMUEL THAYER MD; Resident Radiologist  This document has been electronically signed.  SHEILA MENON MD; Attending Radiologist  This document has been electronically signed. Apr 10 2025  9:06PM    ORT Score -   Family Hx of substance abuse	Female	      Male  Alcohol 	                                           1                     3  Illegal drugs	                                   2                     3  Rx drugs                                           4 	                  4  Personal Hx of substance abuse		  Alcohol 	                                          3	                  3  Illegal drugs                                     4	                  4  Rx drugs                                            5 	                  5  Age between 16- 45 years	           1                     1  hx preadolescent sexual abuse	   3 	                  0  Psychological disease		  ADD, OCD, bipolar, schizophrenia   2	          2  Depression                                           1 	          1  Total: 0    a score of 3 or lower indicates low risk for opioid abuse		  a score of 4-7 indicates moderate risk for opioid abuse		  a score of 8 or higher indicates high risk for opioid abuse    REVIEW OF SYSTEMS:  unable to do, due to Hx of Dementia    PHYSICAL EXAM:  GENERAL:  + somnolent, alert & Oriented X 1, NAD, Speech is soft and muffled. Pt is Sammarinese speaking  RESPIRATORY: Respirations even and unlabored. Clear to auscultation bilaterally; No rales, rhonchi, wheezing, or rubs  CARDIOVASCULAR: Normal S1/S2, regular rate and rhythm; No murmurs, rubs, or gallops. No JVD.   GASTROINTESTINAL:  Soft, Nontender, Nondistended; Bowel sounds present  PERIPHERAL VASCULAR:  Extremities warm without edema. 2+ Peripheral Pulses, No cyanosis,   MUSCULOSKELETAL:  + tenderness on palpation of left hip  SKIN: Warm, dry, intact. No rashes, lesions, scars or wounds. +left hip ecchymotic.     Risk factors associated with adverse outcomes related to opioid treatment  [ ]  Concurrent benzodiazepine use  [ ]  History/ Active substance use or alcohol use disorder  [x] Psychiatric co-morbidity  [ ] Sleep apnea  [x] COPD  [ ] BMI> 35  [ ] Liver dysfunction  [ ] Renal dysfunction  [ ] CHF  [ ] Smoker  [x]  Age > 60 years    [x]  [place]  Reviewed and Copied to Chart. See below.    Plan of care and goal oriented pain management treatment options were discussed with patient and /or primary care giver; all questions and concerns were addressed and care was aligned with patient's wishes.    Educated patient on goal oriented pain management treatment options     25 @ 14:30

## 2025-04-11 NOTE — PHYSICAL THERAPY INITIAL EVALUATION ADULT - GENERAL OBSERVATIONS, REHAB EVAL
Consult received, chart reviewed. Patient received supine in bed, NAD, + primafit. Patient agreed to EVALUATION from Physical Therapist.

## 2025-04-11 NOTE — PROGRESS NOTE ADULT - SUBJECTIVE AND OBJECTIVE BOX
Patient is seen and examined at the bed side, is afebrile. Her mental status has improved, less confused.      REVIEW OF SYSTEMS: Unable to obtain due to mental status       ALLERGIES: No Known Allergies      Vital Signs Last 24 Hrs  T(C): 36.2 (11 Apr 2025 12:20), Max: 36.4 (10 Apr 2025 20:10)  T(F): 97.2 (11 Apr 2025 12:20), Max: 97.5 (10 Apr 2025 20:10)  HR: 70 (11 Apr 2025 12:20) (70 - 86)  BP: 113/66 (11 Apr 2025 12:20) (110/58 - 130/78)  BP(mean): 81 (11 Apr 2025 12:20) (81 - 95)  RR: 18 (11 Apr 2025 12:20) (16 - 18)  SpO2: 94% (11 Apr 2025 12:20) (94% - 95%)    Parameters below as of 11 Apr 2025 12:20  Patient On (Oxygen Delivery Method): room air        PHYSICAL EXAM:  GENERAL: Not in acute distress  CHEST/LUNG:  Air entry bilaterally  HEART: s1 and s2 present  ABDOMEN:  Nontender and  Nondistended  EXTREMITIES: No pedal  edema  CNS: Awake and Less confused      LABS: No new Labs                         11.9   7.85  )-----------( 197      ( 10 Apr 2025 05:30 )             37.8                         12.5   11.64 )-----------( 219      ( 09 Apr 2025 06:10 )             39.3         04-10    136  |  100  |  24[H]  ----------------------------<  95  3.1[L]   |  29  |  0.87    Ca    9.5      10 Apr 2025 05:30  Phos  3.0     04-10  Mg     1.7     04-10      04-09    134[L]  |  96  |  20[H]  ----------------------------<  79  3.0[L]   |  30  |  0.90    Ca    9.5      09 Apr 2025 06:10  Phos  3.0     04-09  Mg     1.7     04-09      TPro  6.4  /  Alb  2.2[L]  /  TBili  0.4  /  DBili  x   /  AST  32  /  ALT  35  /  AlkPhos  111  04-07    PT/INR - ( 06 Apr 2025 11:45 )   PT: 10.9 sec;   INR: 0.93 ratio         PTT - ( 06 Apr 2025 11:45 )  PTT:31.6 sec      MEDICATIONS  (STANDING):    acetaminophen     Tablet .. 1000 milliGRAM(s) Oral every 8 hours  amLODIPine   Tablet 5 milliGRAM(s) Oral daily  fluticasone propionate/ salmeterol 100-50 MICROgram(s) Diskus 1 Dose(s) Inhalation two times a day  heparin   Injectable 5000 Unit(s) SubCutaneous every 8 hours  levothyroxine 88 MICROGram(s) Oral daily  lidocaine   4% Patch 3 Patch Transdermal daily  naloxone Injectable 0.4 milliGRAM(s) IV Push once  piperacillin/tazobactam IVPB.. 3.375 Gram(s) IV Intermittent every 8 hours  polyethylene glycol 3350 17 Gram(s) Oral daily  senna 2 Tablet(s) Oral at bedtime      RADIOLOGY & ADDITIONAL TESTS:    4/6/25: Xray Femur 2 Views, Left (04.06.25 @ 15:42) IMPRESSION: Subcapital fracture of the left hip. 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      4/6/25: CT Cervical Spine No Cont (04.06.25 @ 13:37) >  IMPRESSION:  CT HEAD : No traumatic intracranial abnormality.    Questionable small left frontal encephalocele extending into the left ethmoid sinus and slightly protruding into the left superomedial orbit.   Recommend MRI of the brain seizure protocol without and with contrast for further evaluation.    Bilateral sphenoid sinus disease.    CT CERVICAL SPINE:    No acute fracture or traumatic malalignment.  Multilevel degenerative changes.      MICROBIOLOGY DATA:    Culture - Blood (04.06.25 @ 11:55)   Specimen Source: Blood Blood  Culture Results: No growth at 5 days    Culture - Blood (04.06.25 @ 11:45)   Specimen Source: Blood Blood  Culture Results: No growth at 5 days      Urine Microscopic-Add On (NC) (04.06.25 @ 11:55)   White Blood Cell - Urine: 2 /HPF  Red Blood Cell - Urine: 2 /HPF  Bacteria: Moderate /HPF  Squamous Epithelial Cells: Present       Patient is seen and examined at the bed side, is afebrile. She is doing much better, no more confusion. The Ortho follow up noted regrading Patient's HCP refusing surgical intervention of  Subcapital fracture of the left hip.      REVIEW OF SYSTEMS: All other review systems are negative        ALLERGIES: No Known Allergies      Vital Signs Last 24 Hrs  T(C): 36.2 (11 Apr 2025 12:20), Max: 36.4 (10 Apr 2025 20:10)  T(F): 97.2 (11 Apr 2025 12:20), Max: 97.5 (10 Apr 2025 20:10)  HR: 70 (11 Apr 2025 12:20) (70 - 86)  BP: 113/66 (11 Apr 2025 12:20) (110/58 - 130/78)  BP(mean): 81 (11 Apr 2025 12:20) (81 - 95)  RR: 18 (11 Apr 2025 12:20) (16 - 18)  SpO2: 94% (11 Apr 2025 12:20) (94% - 95%)    Parameters below as of 11 Apr 2025 12:20  Patient On (Oxygen Delivery Method): room air        PHYSICAL EXAM:  GENERAL: Not in acute distress  CHEST/LUNG:  Air entry bilaterally  HEART: s1 and s2 present  ABDOMEN:  Nontender and  Nondistended  EXTREMITIES: No pedal  edema  CNS: Awake and Alert      LABS: No new Labs                         11.9   7.85  )-----------( 197      ( 10 Apr 2025 05:30 )             37.8                         12.5   11.64 )-----------( 219      ( 09 Apr 2025 06:10 )             39.3         04-10    136  |  100  |  24[H]  ----------------------------<  95  3.1[L]   |  29  |  0.87    Ca    9.5      10 Apr 2025 05:30  Phos  3.0     04-10  Mg     1.7     04-10      04-09    134[L]  |  96  |  20[H]  ----------------------------<  79  3.0[L]   |  30  |  0.90    Ca    9.5      09 Apr 2025 06:10  Phos  3.0     04-09  Mg     1.7     04-09      TPro  6.4  /  Alb  2.2[L]  /  TBili  0.4  /  DBili  x   /  AST  32  /  ALT  35  /  AlkPhos  111  04-07    PT/INR - ( 06 Apr 2025 11:45 )   PT: 10.9 sec;   INR: 0.93 ratio         PTT - ( 06 Apr 2025 11:45 )  PTT:31.6 sec      MEDICATIONS  (STANDING):    acetaminophen     Tablet .. 1000 milliGRAM(s) Oral every 8 hours  amLODIPine   Tablet 5 milliGRAM(s) Oral daily  fluticasone propionate/ salmeterol 100-50 MICROgram(s) Diskus 1 Dose(s) Inhalation two times a day  heparin   Injectable 5000 Unit(s) SubCutaneous every 8 hours  levothyroxine 88 MICROGram(s) Oral daily  lidocaine   4% Patch 3 Patch Transdermal daily  naloxone Injectable 0.4 milliGRAM(s) IV Push once  piperacillin/tazobactam IVPB.. 3.375 Gram(s) IV Intermittent every 8 hours  polyethylene glycol 3350 17 Gram(s) Oral daily  senna 2 Tablet(s) Oral at bedtime      RADIOLOGY & ADDITIONAL TESTS:    4/6/25: Xray Femur 2 Views, Left (04.06.25 @ 15:42) IMPRESSION: Subcapital fracture of the left hip. 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      4/6/25: CT Cervical Spine No Cont (04.06.25 @ 13:37) >  IMPRESSION:  CT HEAD : No traumatic intracranial abnormality.    Questionable small left frontal encephalocele extending into the left ethmoid sinus and slightly protruding into the left superomedial orbit.   Recommend MRI of the brain seizure protocol without and with contrast for further evaluation.    Bilateral sphenoid sinus disease.    CT CERVICAL SPINE:    No acute fracture or traumatic malalignment.  Multilevel degenerative changes.      MICROBIOLOGY DATA:    Culture - Blood (04.06.25 @ 11:55)   Specimen Source: Blood Blood  Culture Results: No growth at 5 days    Culture - Blood (04.06.25 @ 11:45)   Specimen Source: Blood Blood  Culture Results: No growth at 5 days      Urine Microscopic-Add On (NC) (04.06.25 @ 11:55)   White Blood Cell - Urine: 2 /HPF  Red Blood Cell - Urine: 2 /HPF  Bacteria: Moderate /HPF  Squamous Epithelial Cells: Present

## 2025-04-11 NOTE — PHYSICAL THERAPY INITIAL EVALUATION ADULT - ADDITIONAL COMMENTS
Pt stated she lives In a private house with 2 ANTHONY. Pt states that she was taking care for her  who required a lot of care. Pt could not remember if they had HHA services at home.

## 2025-04-11 NOTE — PROGRESS NOTE ADULT - SUBJECTIVE AND OBJECTIVE BOX
Date of Service 04-11-25 @ 12:52    CHIEF COMPLAINT:Patient is a 89y old  Female who presents with a chief complaint of fall .Pt appears comfortable.    	  REVIEW OF SYSTEMS:  CONSTITUTIONAL: No fever, weight loss, or fatigue  EYES: No eye pain, visual disturbances, or discharge  ENT:  No difficulty hearing, tinnitus, vertigo; No sinus or throat pain  NECK: No pain or stiffness  RESPIRATORY: No cough, wheezing, chills or hemoptysis; No Shortness of Breath  CARDIOVASCULAR: No chest pain, palpitations, passing out, dizziness, or leg swelling  GASTROINTESTINAL: No abdominal or epigastric pain. No nausea, vomiting, or hematemesis; No diarrhea or constipation. No melena or hematochezia.  GENITOURINARY: No dysuria, frequency, hematuria, or incontinence  NEUROLOGICAL: No headaches, memory loss, loss of strength, numbness, or tremors  SKIN: No itching, burning, rashes, or lesions   LYMPH Nodes: No enlarged glands  ENDOCRINE: No heat or cold intolerance; No hair loss  MUSCULOSKELETAL: No joint pain or swelling; No muscle, back, or extremity pain  PSYCHIATRIC: No depression, anxiety, mood swings, or difficulty sleeping  HEME/LYMPH: No easy bruising, or bleeding gums  ALLERGY AND IMMUNOLOGIC: No hives or eczema	        PHYSICAL EXAM:  T(C): 36.2 (04-11-25 @ 12:20), Max: 36.4 (04-10-25 @ 13:35)  HR: 70 (04-11-25 @ 12:20) (60 - 86)  BP: 113/66 (04-11-25 @ 12:20) (110/58 - 130/78)  RR: 18 (04-11-25 @ 12:20) (16 - 18)  SpO2: 94% (04-11-25 @ 12:20) (93% - 95%)  Wt(kg): --  I&O's Summary      Appearance: Normal	  HEENT:   Normal oral mucosa, PERRL, EOMI	  Lymphatic: No lymphadenopathy  Cardiovascular: Normal S1 S2, No JVD, No murmurs, No edema  Respiratory: Lungs clear to auscultation	  Psychiatry: A & O x 3, Mood & affect appropriate  Gastrointestinal:  Soft, Non-tender, + BS	  Skin: No rashes, No ecchymoses, No cyanosis	  Neurologic: Non-focal  Extremities: Normal range of motion, No clubbing, cyanosis or edema  Vascular: Peripheral pulses palpable 2+ bilaterally    MEDICATIONS  (STANDING):  acetaminophen     Tablet .. 1000 milliGRAM(s) Oral every 8 hours  amLODIPine   Tablet 5 milliGRAM(s) Oral daily  fluticasone propionate/ salmeterol 100-50 MICROgram(s) Diskus 1 Dose(s) Inhalation two times a day  heparin   Injectable 5000 Unit(s) SubCutaneous every 8 hours  levothyroxine 88 MICROGram(s) Oral daily  lidocaine   4% Patch 3 Patch Transdermal daily  naloxone Injectable 0.4 milliGRAM(s) IV Push once  piperacillin/tazobactam IVPB.. 3.375 Gram(s) IV Intermittent every 8 hours  polyethylene glycol 3350 17 Gram(s) Oral daily  senna 2 Tablet(s) Oral at bedtime        LABS:	 	                        11.9   7.85  )-----------( 197      ( 10 Apr 2025 05:30 )             37.8     04-11    136  |  101  |  21[H]  ----------------------------<  87  4.2   |  30  |  1.00    Ca    9.4      11 Apr 2025 05:20  Phos  3.0     04-10  Mg     1.8     04-11        TSH: Thyroid Stimulating Hormone, Serum: 20.50 uU/mL (04-07 @ 05:25)    < from: MR Head w/wo IV Cont (04.10.25 @ 15:11) >    ACC: [id number] EXAM:  MR BRAIN WAW IC   ORDERED BY: AN NYE     PROCEDURE DATE:  04/10/2025          INTERPRETATION:  CLINICAL INFORMATION: LEFT frontal encephalocele.. HMR.   Admitting Dxs: S72.009A FRACTURE OF UNSP PART OF NECK OF UNSP FEMUR, INIT.    TECHNIQUE: Multiplanar, multisequence brain MRI was performed following   the administration of 5 ml Gadavist intravenous contrast with 5 ml   discarded.    COMPARISON: CT head 4/6/2025.    FINDINGS:    Motion artifact limits interpretation.    A questionable small left frontal encephalocele extending into the left   ethmoid sinus and slightly protruding into the left superior medial orbit   (15:5).    There is no evidence of acute infarct. There are no foci of   susceptibility artifact tosuggest hemorrhage. Scattered foci of T2/FLAIR   hyperintensity in the bilateral hemispheric white matter are nonspecific   but likely related to chronic white matter microvascular ischemic   disease. The ventricles are normal in size for patient's age.    Flow voids of the major intracranial vessels at the skull base follow   expected course and contour.    Mucosal thickening causing near complete opacification of the left   sphenoid sinus. Trace fluid within the right sphenoid sinus. The mastoids   demonstrate no signal abnormality.  Bilateral orbits are within normal   limits.      IMPRESSION:  Questionable small left frontal encephalocele, as noted on recent CT.   Given the size and proximity of the possible defect to the superior   medial left orbit, would recommend further evaluation with an MRI and CT   of the orbits and thin cuts..    --- End of Report ---          SAMUEL THAYER MD; Resident Radiologist  This document has been electronically signed.  SHEILA MENON MD; Attending Radiologist  This document has been electronically signed. Apr 10 2025  9:06PM    < end of copied text >  < from: MR Hip No Cont, Left (04.10.25 @ 15:06) >    ACC: [id number] EXAM:  MR HIP LT   ORDERED BY: AN NYE     PROCEDURE DATE:  04/10/2025          INTERPRETATION:  EXAMINATION: MRI left hip without contrast    CLINICAL INFORMATION: Left hip pain    TECHNIQUE: Multiplanar, multisequential MR imaging was performed.    FINDINGS: There is a nondisplaced subcapital left femoral neck fracture   with extension into the anterior aspect of the femoral head. There is   adjacent marrow edema. There are no other fractures. There is mild left   hip osteoarthritis. There is a small left hip joint effusion. There are   no acute high-grade or full-thickness tendon or muscle tears. There is   mild edema in the left adductor muscles. There is no muscle atrophy.    IMPRESSION: Acute nondisplaced fracture of the left femoral head/left   subcapital femoral neck.    --- End of Report ---            JENNIFER SHRESTHA MD; Attending Radiologist  This document has been electronically signed. Apr 10 2025  3:18PM    < end of copied text >

## 2025-04-12 RX ORDER — MONTELUKAST SODIUM 10 MG/1
10 TABLET ORAL AT BEDTIME
Refills: 0 | Status: DISCONTINUED | OUTPATIENT
Start: 2025-04-12 | End: 2025-04-15

## 2025-04-12 RX ADMIN — Medication 1000 MILLIGRAM(S): at 06:26

## 2025-04-12 RX ADMIN — LIDOCAINE HYDROCHLORIDE 3 PATCH: 20 JELLY TOPICAL at 12:05

## 2025-04-12 RX ADMIN — LIDOCAINE HYDROCHLORIDE 3 PATCH: 20 JELLY TOPICAL at 20:03

## 2025-04-12 RX ADMIN — Medication 1000 MILLIGRAM(S): at 14:41

## 2025-04-12 RX ADMIN — Medication 1000 MILLIGRAM(S): at 05:40

## 2025-04-12 RX ADMIN — Medication 25 GRAM(S): at 08:50

## 2025-04-12 RX ADMIN — HEPARIN SODIUM 5000 UNIT(S): 1000 INJECTION INTRAVENOUS; SUBCUTANEOUS at 21:47

## 2025-04-12 RX ADMIN — Medication 1 DOSE(S): at 21:50

## 2025-04-12 RX ADMIN — Medication 2 TABLET(S): at 21:47

## 2025-04-12 RX ADMIN — AMLODIPINE BESYLATE 5 MILLIGRAM(S): 10 TABLET ORAL at 05:40

## 2025-04-12 RX ADMIN — Medication 1000 MILLIGRAM(S): at 21:48

## 2025-04-12 RX ADMIN — Medication 88 MICROGRAM(S): at 05:38

## 2025-04-12 RX ADMIN — Medication 25 GRAM(S): at 16:42

## 2025-04-12 RX ADMIN — HEPARIN SODIUM 5000 UNIT(S): 1000 INJECTION INTRAVENOUS; SUBCUTANEOUS at 13:41

## 2025-04-12 RX ADMIN — Medication 25 GRAM(S): at 23:42

## 2025-04-12 RX ADMIN — Medication 1 DOSE(S): at 09:09

## 2025-04-12 RX ADMIN — Medication 1000 MILLIGRAM(S): at 13:41

## 2025-04-12 RX ADMIN — HEPARIN SODIUM 5000 UNIT(S): 1000 INJECTION INTRAVENOUS; SUBCUTANEOUS at 05:39

## 2025-04-12 RX ADMIN — MONTELUKAST SODIUM 10 MILLIGRAM(S): 10 TABLET ORAL at 21:50

## 2025-04-12 NOTE — PROGRESS NOTE ADULT - SUBJECTIVE AND OBJECTIVE BOX
Patient is seen and examined at the bed side, is afebrile. She has no new complaints. doing better.      REVIEW OF SYSTEMS: All other review systems are negative      ALLERGIES: No Known Allergies      Vital Signs Last 24 Hrs  T(C): 36.2 (12 Apr 2025 13:42), Max: 36.4 (12 Apr 2025 05:16)  T(F): 97.2 (12 Apr 2025 13:42), Max: 97.6 (12 Apr 2025 05:16)  HR: 86 (12 Apr 2025 13:42) (71 - 86)  BP: 121/74 (12 Apr 2025 13:42) (109/59 - 137/85)  BP(mean): 95 (12 Apr 2025 13:42) (95 - 95)  RR: 17 (12 Apr 2025 13:42) (16 - 18)  SpO2: 95% (12 Apr 2025 13:42) (94% - 95%)    Parameters below as of 12 Apr 2025 13:42  Patient On (Oxygen Delivery Method): room air        PHYSICAL EXAM:  GENERAL: Not in acute distress  CHEST/LUNG:  Air entry bilaterally  HEART: s1 and s2 present  ABDOMEN:  Nontender and  Nondistended  EXTREMITIES: No pedal  edema  CNS: Awake and Alert      LABS: No new Labs                           11.9   7.85  )-----------( 197      ( 10 Apr 2025 05:30 )             37.8                         12.5   11.64 )-----------( 219      ( 09 Apr 2025 06:10 )             39.3         04-10    136  |  100  |  24[H]  ----------------------------<  95  3.1[L]   |  29  |  0.87    Ca    9.5      10 Apr 2025 05:30  Phos  3.0     04-10  Mg     1.7     04-10      04-09    134[L]  |  96  |  20[H]  ----------------------------<  79  3.0[L]   |  30  |  0.90    Ca    9.5      09 Apr 2025 06:10  Phos  3.0     04-09  Mg     1.7     04-09      TPro  6.4  /  Alb  2.2[L]  /  TBili  0.4  /  DBili  x   /  AST  32  /  ALT  35  /  AlkPhos  111  04-07    PT/INR - ( 06 Apr 2025 11:45 )   PT: 10.9 sec;   INR: 0.93 ratio         PTT - ( 06 Apr 2025 11:45 )  PTT:31.6 sec      MEDICATIONS  (STANDING):    acetaminophen     Tablet .. 1000 milliGRAM(s) Oral every 8 hours  amLODIPine   Tablet 5 milliGRAM(s) Oral daily  fluticasone propionate/ salmeterol 100-50 MICROgram(s) Diskus 1 Dose(s) Inhalation two times a day  heparin   Injectable 5000 Unit(s) SubCutaneous every 8 hours  levothyroxine 88 MICROGram(s) Oral daily  lidocaine   4% Patch 3 Patch Transdermal daily  montelukast 10 milliGRAM(s) Oral at bedtime  naloxone Injectable 0.4 milliGRAM(s) IV Push once  piperacillin/tazobactam IVPB.. 3.375 Gram(s) IV Intermittent every 8 hours  polyethylene glycol 3350 17 Gram(s) Oral daily  senna 2 Tablet(s) Oral at bedtime        RADIOLOGY & ADDITIONAL TESTS:    4/6/25: Xray Femur 2 Views, Left (04.06.25 @ 15:42) IMPRESSION: Subcapital fracture of the left hip. 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      4/6/25: CT Cervical Spine No Cont (04.06.25 @ 13:37) >  IMPRESSION:  CT HEAD : No traumatic intracranial abnormality.    Questionable small left frontal encephalocele extending into the left ethmoid sinus and slightly protruding into the left superomedial orbit.   Recommend MRI of the brain seizure protocol without and with contrast for further evaluation.    Bilateral sphenoid sinus disease.    CT CERVICAL SPINE:    No acute fracture or traumatic malalignment.  Multilevel degenerative changes.      MICROBIOLOGY DATA:    Culture - Blood (04.06.25 @ 11:55)   Specimen Source: Blood Blood  Culture Results: No growth at 5 days    Culture - Blood (04.06.25 @ 11:45)   Specimen Source: Blood Blood  Culture Results: No growth at 5 days      Urine Microscopic-Add On (NC) (04.06.25 @ 11:55)   White Blood Cell - Urine: 2 /HPF  Red Blood Cell - Urine: 2 /HPF  Bacteria: Moderate /HPF  Squamous Epithelial Cells: Present

## 2025-04-12 NOTE — PROGRESS NOTE ADULT - SUBJECTIVE AND OBJECTIVE BOX
Patient is a 89y old  Female who presents with a chief complaint of fall (11 Apr 2025 16:19)    pt seen in icu [  ], reg med floor [ x  ], bed [x  ], chair at bedside [   ], a+o x3 [x  ], lethargic [  ],  nad [ x ]    Allergies    No Known Allergies        Vitals    T(F): 97.6 (04-12-25 @ 05:16), Max: 97.6 (04-12-25 @ 05:16)  HR: 71 (04-12-25 @ 05:16) (70 - 80)  BP: 137/85 (04-12-25 @ 05:16) (95/57 - 137/85)  RR: 16 (04-12-25 @ 05:16) (16 - 18)  SpO2: 95% (04-12-25 @ 05:16) (92% - 95%)  Wt(kg): --  CAPILLARY BLOOD GLUCOSE          Labs        04-11    136  |  101  |  21[H]  ----------------------------<  87  4.2   |  30  |  1.00    Ca    9.4      11 Apr 2025 05:20  Mg     1.8     04-11              Blood Blood  04-06 @ 11:55   No growth at 5 days  --  --      Blood Blood  04-06 @ 11:45   No growth at 5 days  --  --    < from: TTE W or WO Ultrasound Enhancing Agent (04.10.25 @ 15:34) >  CONCLUSIONS:      1. Technically difficult image quality.   2. Left ventricular systolic function is normal with an ejection fraction of 51 % by Garcia's method of disks.   3. There is normal LV mass andconcentric remodeling.   4. There is mild (grade 1) left ventricular diastolic dysfunction.   5. Normal right ventricular cavity size and normal right ventricular systolic function.    < end of copied text >        Radiology Results      Meds    MEDICATIONS  (STANDING):  acetaminophen     Tablet .. 1000 milliGRAM(s) Oral every 8 hours  amLODIPine   Tablet 5 milliGRAM(s) Oral daily  fluticasone propionate/ salmeterol 100-50 MICROgram(s) Diskus 1 Dose(s) Inhalation two times a day  heparin   Injectable 5000 Unit(s) SubCutaneous every 8 hours  levothyroxine 88 MICROGram(s) Oral daily  lidocaine   4% Patch 3 Patch Transdermal daily  naloxone Injectable 0.4 milliGRAM(s) IV Push once  piperacillin/tazobactam IVPB.. 3.375 Gram(s) IV Intermittent every 8 hours  polyethylene glycol 3350 17 Gram(s) Oral daily  senna 2 Tablet(s) Oral at bedtime      MEDICATIONS  (PRN):  albuterol    90 MICROgram(s) HFA Inhaler 2 Puff(s) Inhalation every 6 hours PRN Shortness of Breath and/or Wheezing  bisacodyl 5 milliGRAM(s) Oral daily PRN Constipation  ondansetron Injectable 4 milliGRAM(s) IV Push every 8 hours PRN Nausea and/or Vomiting  oxyCODONE    IR 2.5 milliGRAM(s) Oral every 4 hours PRN Moderate Pain (4 - 6)  oxyCODONE    IR 5 milliGRAM(s) Oral every 4 hours PRN Severe Pain (7 - 10)      Physical Exam    Neuro :  no focal deficits  Respiratory: CTA B/L  CV: RRR, S1S2, no murmurs,   Abdominal: Soft, NT, ND +BS,  Extremities: No edema, + peripheral pulses    ASSESSMENT    left femoral neck fx,   bronchiectasis with karen,   licha,    demand ischemia   left frontal encephalocele,   sinusitis   h/o HTN,   hypothyroidism,   COPD,   ?dementia       PLAN    cont pain control,   pain mgmt f/u   Opioid pain recommendations   - Continue Oxycodone 2.5/5 mg PO q4h PRN moderate/severe pain. Monitor for sedation/ respiratory depression.   Non-opioid pain recommendations   - Continue Acetaminophen 1 gram PO q 8 hours x 4 days. Monitor LFT's  - Continue Lidocaine 4% patches x 3 daily.  Bowel Regimen  - Continue Miralax 17G PO daily  - Continue Senna 2 tablets at bedtime for constipation  Mild pain   - Non-pharmacological pain treatment recommendations  - Warm/ Cool packs PRN   - Repositioning extremity, guided imagery, relaxation, distraction.  - Physical therapy OOB if no contraindications   mri left hip with Acute nondisplaced fracture of the left femoral head/left   subcapital femoral neck noted    ortho f/u      -  MRI results for L Hip reviewed    -  HCP patients niece Nataly informed of risks and benefits of surgical vs conservative management, all questions and concerns answered. HCP refusing surgical intervention.   -  Recommendation: Conservative management , no surgical orthopedic intervention is recommended at this time.  -  DVT prophylaxis with heparin and Venodynes  -  Daily PT: PWB to LLE at this time, OOB with assistive device  phys tx eval noted and rec phys tx daily 4-6 weeks in [facility]  id f/u   Continue Zosyn to treat sinusitis while inpatient,   may change to oral Augmentin 875 mg q 12hours on discharge to continue until 4/19/25  pulm f/u    Bronchodilators  Budesonide 0.25 mgs one unit  dose neb BID  Montelukast 10 mgs po Qhs.  serum creat wnl   potassium  wnl  phos wnl  trop trending down   cardio f/u   cont norvasc   echo  ejection fraction of 51 % mild (grade 1) left ventricular diastolic dysfunction noted above.  mri with Questionable small left frontal encephalocele, as noted on recent CT.   Given the size and proximity of the possible defect to the superior   medial left orbit, would recommend further evaluation with an MRI and CT   of the orbits and thin cuts noted     neuro f/u   incidental small left frontal encephalocele, without evidence of acute intracranial abnormality,    - No role for neurosurgical intervention for incidental finding of small left frontal encephalocele  tsh elevated 20.5  ft4 1.1   endo f/u  cont lt4 to 88 mcg  tfts in 4-6 wks  cont current meds   d/c [facility] pending acceptance

## 2025-04-12 NOTE — PROGRESS NOTE ADULT - SUBJECTIVE AND OBJECTIVE BOX
Patient is a 89y old  Female who presents with a chief complaint of fall (12 Apr 2025 07:27)  Awake, alert, lying in bed in NAD. Doing well on RA    INTERVAL HPI/OVERNIGHT EVENTS:      VITAL SIGNS:  T(F): 97.6 (04-12-25 @ 05:16)  HR: 71 (04-12-25 @ 05:16)  BP: 137/85 (04-12-25 @ 05:16)  RR: 16 (04-12-25 @ 05:16)  SpO2: 95% (04-12-25 @ 05:16)  Wt(kg): --  I&O's Detail          REVIEW OF SYSTEMS:    CONSTITUTIONAL:  No fevers, chills, sweats    HEENT:  Eyes:  No diplopia or blurred vision. ENT:  No earache, sore throat or runny nose.    CARDIOVASCULAR:  No pressure, squeezing, tightness, or heaviness about the chest; no palpitations.    RESPIRATORY:  Per HPI    GASTROINTESTINAL:  No abdominal pain, nausea, vomiting or diarrhea.    GENITOURINARY:  No dysuria, frequency or urgency.    NEUROLOGIC:  No paresthesias, fasciculations, seizures or weakness.    PSYCHIATRIC:  No disorder of thought or mood.      PHYSICAL EXAM:    Constitutional: Well developed and nourished  Eyes:Perrla  ENMT: normal  Neck:supple  Respiratory: B/L rales  Cardiovascular: S1 S2 regular  Gastrointestinal: Soft, Non tender  Extremities: No edema  Vascular:normal  Neurological:Awake, alert,Ox3  Musculoskeletal:Normal      MEDICATIONS  (STANDING):  acetaminophen     Tablet .. 1000 milliGRAM(s) Oral every 8 hours  amLODIPine   Tablet 5 milliGRAM(s) Oral daily  fluticasone propionate/ salmeterol 100-50 MICROgram(s) Diskus 1 Dose(s) Inhalation two times a day  heparin   Injectable 5000 Unit(s) SubCutaneous every 8 hours  levothyroxine 88 MICROGram(s) Oral daily  lidocaine   4% Patch 3 Patch Transdermal daily  naloxone Injectable 0.4 milliGRAM(s) IV Push once  piperacillin/tazobactam IVPB.. 3.375 Gram(s) IV Intermittent every 8 hours  polyethylene glycol 3350 17 Gram(s) Oral daily  senna 2 Tablet(s) Oral at bedtime    MEDICATIONS  (PRN):  albuterol    90 MICROgram(s) HFA Inhaler 2 Puff(s) Inhalation every 6 hours PRN Shortness of Breath and/or Wheezing  bisacodyl 5 milliGRAM(s) Oral daily PRN Constipation  ondansetron Injectable 4 milliGRAM(s) IV Push every 8 hours PRN Nausea and/or Vomiting  oxyCODONE    IR 2.5 milliGRAM(s) Oral every 4 hours PRN Moderate Pain (4 - 6)  oxyCODONE    IR 5 milliGRAM(s) Oral every 4 hours PRN Severe Pain (7 - 10)      Allergies    No Known Allergies    Intolerances        LABS:    04-11    136  |  101  |  21[H]  ----------------------------<  87  4.2   |  30  |  1.00    Ca    9.4      11 Apr 2025 05:20  Mg     1.8     04-11        Urinalysis Basic - ( 11 Apr 2025 05:20 )    Color: x / Appearance: x / SG: x / pH: x  Gluc: 87 mg/dL / Ketone: x  / Bili: x / Urobili: x   Blood: x / Protein: x / Nitrite: x   Leuk Esterase: x / RBC: x / WBC x   Sq Epi: x / Non Sq Epi: x / Bacteria: x            CAPILLARY BLOOD GLUCOSE            RADIOLOGY & ADDITIONAL TESTS:    CXR:    Ct scan chest:    ekg;    echo:

## 2025-04-12 NOTE — GOALS OF CARE CONVERSATION - ADVANCED CARE PLANNING - CONVERSATION DETAILS
Discussed with (HCP, surrogate, legal guardian): Nataly Cr  About: Cardiac and respiratory resuscitative measures including CPR, shock, vasopressors, invasive ventilatory support via intubation.  All risks and benefits discussed. All questions answered.     DNR and MOLST forms were affirmed from 6/15/20.  Please refer to [facility] documentation in EMR for further details.       Treatment Guidelines  DNR Order; No artificial nutrition; Antibiotic trial; IV fluid trial  · Treatment Guideline Comments  MOLST: DNR / DNI / no feeding tube / trial IV fluids / use abx.  · Future Hospitalization/Transfer  Send to hospital, if necessary, based on MOLST orders    MOLST:  · Completed  15-Campos-2020

## 2025-04-12 NOTE — PROGRESS NOTE ADULT - ASSESSMENT
89F, from home, ambulates with cane, PMH HTN, hypothyroidism, COPD, ?dementia. Presenting after fall at home,Lt hip fx,pneumonia.  1.Echocardiogram as above.  2.MR-Lt hip as above,HCP declining surgery.  3.HTN-norvasc.  4.Hypothyroid-synthroid.  5.COPD-management as per Pulm.  6.Pneumonia-abx.  7.GI and DVT prophylaxis.

## 2025-04-12 NOTE — PROGRESS NOTE ADULT - ASSESSMENT
Patient is a 89y old  Female who is from home, ambulates with cane, and PMH of HTN, hypothyroidism, COPD, ?dementia. now Presents to the ER for evaluation of A fall at home, was found by [facility]HA on the floor. Unknown down time. In ER, patient AAOx2 (to self, place). States that she had a fall while attempting to stand up, fell and landed on her side. Denies head trauma. On admission, she found to have no fever but Subcapital fracture of the left hip and Bilateral sphenoid sinus disease.  The CT chest shows Bronchiectasis, hence the ID consult requested to assist with further evaluation and rule out ROWDY.    # Bilateral sphenoid sinusitis  # Bronchiectasis, R/O ROWDY - unable to  produce Sputum even with " sputum Induction" by Respiratory team and  Family does not want any invasive procedure such as Bronchoscopy   # Subcapital fracture of the left hip after  A fall - Patient's HCP refusing surgical intervention of  Subcapital fracture of the left hip.    would recommend:    1. PT/ OOB to chair  2. Continue Zosyn to treat sinusitis while inpatient, may change to oral Augmentin 875 mg q 12hours on discharge to continue until 4/19/25  3. Pain Management as needed    Attending Attestation:    Spent more than 35 minutes on total encounter, more than 50 % of the visit was spent counseling and/or coordinating care by the Attending physician.

## 2025-04-12 NOTE — PROGRESS NOTE ADULT - SUBJECTIVE AND OBJECTIVE BOX
Date of Service 04-12-25 @ 12:39    CHIEF COMPLAINT:Patient is a 89y old  Female who presents with a chief complaint of fall.Pt appears comfortable.    	  REVIEW OF SYSTEMS:  CONSTITUTIONAL: No fever, weight loss, or fatigue  EYES: No eye pain, visual disturbances, or discharge  ENT:  No difficulty hearing, tinnitus, vertigo; No sinus or throat pain  NECK: No pain or stiffness  RESPIRATORY: No cough, wheezing, chills or hemoptysis; No Shortness of Breath  CARDIOVASCULAR: No chest pain, palpitations, passing out, dizziness, or leg swelling  GASTROINTESTINAL: No abdominal or epigastric pain. No nausea, vomiting, or hematemesis; No diarrhea or constipation. No melena or hematochezia.  GENITOURINARY: No dysuria, frequency, hematuria, or incontinence  NEUROLOGICAL: No headaches, memory loss, loss of strength, numbness, or tremors  SKIN: No itching, burning, rashes, or lesions   LYMPH Nodes: No enlarged glands  ENDOCRINE: No heat or cold intolerance; No hair loss  MUSCULOSKELETAL: No joint pain or swelling; No muscle, back, or extremity pain  PSYCHIATRIC: No depression, anxiety, mood swings, or difficulty sleeping  HEME/LYMPH: No easy bruising, or bleeding gums  ALLERGY AND IMMUNOLOGIC: No hives or eczema	        PHYSICAL EXAM:  T(C): 36.4 (04-12-25 @ 05:16), Max: 36.4 (04-12-25 @ 05:16)  HR: 71 (04-12-25 @ 05:16) (71 - 80)  BP: 137/85 (04-12-25 @ 05:16) (95/57 - 137/85)  RR: 16 (04-12-25 @ 05:16) (16 - 18)  SpO2: 95% (04-12-25 @ 05:16) (92% - 95%)  Wt(kg): --  I&O's Summary      Appearance: Normal	  HEENT:   Normal oral mucosa, PERRL, EOMI	  Lymphatic: No lymphadenopathy  Cardiovascular: Normal S1 S2, No JVD, No murmurs, No edema  Respiratory: Lungs clear to auscultation	  Psychiatry: A & O x 3, Mood & affect appropriate  Gastrointestinal:  Soft, Non-tender, + BS	  Skin: No rashes, No ecchymoses, No cyanosis	  Neurologic: Non-focal  Extremities: Normal range of motion, No clubbing, cyanosis or edema  Vascular: Peripheral pulses palpable 2+ bilaterally    MEDICATIONS  (STANDING):  acetaminophen     Tablet .. 1000 milliGRAM(s) Oral every 8 hours  amLODIPine   Tablet 5 milliGRAM(s) Oral daily  fluticasone propionate/ salmeterol 100-50 MICROgram(s) Diskus 1 Dose(s) Inhalation two times a day  heparin   Injectable 5000 Unit(s) SubCutaneous every 8 hours  levothyroxine 88 MICROGram(s) Oral daily  lidocaine   4% Patch 3 Patch Transdermal daily  naloxone Injectable 0.4 milliGRAM(s) IV Push once  piperacillin/tazobactam IVPB.. 3.375 Gram(s) IV Intermittent every 8 hours  polyethylene glycol 3350 17 Gram(s) Oral daily  senna 2 Tablet(s) Oral at bedtime      LABS:	 	                04-11    136  |  101  |  21[H]  ----------------------------<  87  4.2   |  30  |  1.00    Ca    9.4      11 Apr 2025 05:20  Mg     1.8     04-11        TSH: Thyroid Stimulating Hormone, Serum: 20.50 uU/mL (04-07 @ 05:25)    < from: TTE W or WO Ultrasound Enhancing Agent (04.10.25 @ 15:34) >  CONCLUSIONS:      1. Technically difficult image quality.   2. Left ventricular systolic function is normal with an ejection fraction of 51 % by Garcia's method of disks.   3. There is normal LV mass andconcentric remodeling.   4. There is mild (grade 1) left ventricular diastolic dysfunction.   5. Normal right ventricular cavity size and normal right ventricular systolic function.    ________________________________________________________________________________________  FINDINGS:     Left Ventricle:  Left ventricular systolic function is normal with a calculated ejection fraction of 51 % by the Garcia's biplane method of disks. There is normal LV mass and concentric remodeling. There is mild(grade 1) left ventricular diastolic dysfunction.     Right Ventricle:  The right ventricular cavity is normal in size and right ventricular systolic function is normal.     Left Atrium:  The left atrium is normal in size.     Right Atrium:  The right atrium is normal in size.     Aortic Valve:  There is mild aortic regurgitation.     Mitral Valve:  There is trace mitral regurgitation.     Tricuspid Valve:  There is mild tricuspid regurgitation. Estimated pulmonary artery systolic pressure is 28 mmHg, consistent with normal pulmonary artery pressure.     Pulmonic Valve:  There is trace pulmonic regurgitation.     Aorta:  The aortic root appears normal in size.     Pericardium:  No pericardial effusion seen.     Systemic Veins:  The inferior vena cava is normal in size (normal <2.1cm) with normal inspiratory collapse (normal >50%) consistent with normal right atrial pressure (~3, range 0-5mmHg).    < end of copied text >    	     Complex Repair And Modified Advancement Flap Text: The defect edges were debeveled with a #15 scalpel blade.  The primary defect was closed partially with a complex linear closure.  Given the location of the remaining defect, shape of the defect and the proximity to free margins a modified advancement flap was deemed most appropriate for complete closure of the defect.  Using a sterile surgical marker, an appropriate advancement flap was drawn incorporating the defect and placing the expected incisions within the relaxed skin tension lines where possible.    The area thus outlined was incised deep to adipose tissue with a #15 scalpel blade.  The skin margins were undermined to an appropriate distance in all directions utilizing iris scissors.

## 2025-04-13 RX ADMIN — HEPARIN SODIUM 5000 UNIT(S): 1000 INJECTION INTRAVENOUS; SUBCUTANEOUS at 21:55

## 2025-04-13 RX ADMIN — Medication 1 DOSE(S): at 21:56

## 2025-04-13 RX ADMIN — Medication 2 TABLET(S): at 21:55

## 2025-04-13 RX ADMIN — Medication 25 GRAM(S): at 16:52

## 2025-04-13 RX ADMIN — OXYCODONE HYDROCHLORIDE 5 MILLIGRAM(S): 30 TABLET ORAL at 14:34

## 2025-04-13 RX ADMIN — OXYCODONE HYDROCHLORIDE 5 MILLIGRAM(S): 30 TABLET ORAL at 13:32

## 2025-04-13 RX ADMIN — POLYETHYLENE GLYCOL 3350 17 GRAM(S): 17 POWDER, FOR SOLUTION ORAL at 12:52

## 2025-04-13 RX ADMIN — Medication 25 GRAM(S): at 08:11

## 2025-04-13 RX ADMIN — Medication 1 DOSE(S): at 10:00

## 2025-04-13 RX ADMIN — Medication 1000 MILLIGRAM(S): at 06:13

## 2025-04-13 RX ADMIN — LIDOCAINE HYDROCHLORIDE 3 PATCH: 20 JELLY TOPICAL at 12:52

## 2025-04-13 RX ADMIN — LIDOCAINE HYDROCHLORIDE 3 PATCH: 20 JELLY TOPICAL at 19:41

## 2025-04-13 RX ADMIN — AMLODIPINE BESYLATE 5 MILLIGRAM(S): 10 TABLET ORAL at 06:13

## 2025-04-13 RX ADMIN — LIDOCAINE HYDROCHLORIDE 3 PATCH: 20 JELLY TOPICAL at 01:22

## 2025-04-13 RX ADMIN — HEPARIN SODIUM 5000 UNIT(S): 1000 INJECTION INTRAVENOUS; SUBCUTANEOUS at 06:13

## 2025-04-13 RX ADMIN — MONTELUKAST SODIUM 10 MILLIGRAM(S): 10 TABLET ORAL at 22:01

## 2025-04-13 RX ADMIN — OXYCODONE HYDROCHLORIDE 2.5 MILLIGRAM(S): 30 TABLET ORAL at 21:55

## 2025-04-13 RX ADMIN — Medication 88 MICROGRAM(S): at 06:18

## 2025-04-13 RX ADMIN — HEPARIN SODIUM 5000 UNIT(S): 1000 INJECTION INTRAVENOUS; SUBCUTANEOUS at 13:26

## 2025-04-13 NOTE — PROGRESS NOTE ADULT - SUBJECTIVE AND OBJECTIVE BOX
Patient is seen and examined at the bed side, is afebrile. She has no new complaints. doing better.      REVIEW OF SYSTEMS: All other review systems are negative      ALLERGIES: No Known Allergies      Vital Signs Last 24 Hrs  T(C): 36.2 (13 Apr 2025 12:25), Max: 36.3 (12 Apr 2025 20:38)  T(F): 97.2 (13 Apr 2025 12:25), Max: 97.3 (12 Apr 2025 20:38)  HR: 84 (13 Apr 2025 15:36) (80 - 85)  BP: 122/82 (13 Apr 2025 15:36) (110/82 - 122/82)  BP(mean): 87 (13 Apr 2025 12:25) (87 - 87)  RR: 18 (13 Apr 2025 15:36) (17 - 18)  SpO2: 96% (13 Apr 2025 15:36) (95% - 96%)    Parameters below as of 13 Apr 2025 15:36  Patient On (Oxygen Delivery Method): room air        PHYSICAL EXAM:  GENERAL: Not in acute distress  CHEST/LUNG:  Air entry bilaterally  HEART: s1 and s2 present  ABDOMEN:  Nontender and  Nondistended  EXTREMITIES: No pedal  edema  CNS: Awake and Alert      LABS: No new Labs                           11.9   7.85  )-----------( 197      ( 10 Apr 2025 05:30 )             37.8                         12.5   11.64 )-----------( 219      ( 09 Apr 2025 06:10 )             39.3         04-10    136  |  100  |  24[H]  ----------------------------<  95  3.1[L]   |  29  |  0.87    Ca    9.5      10 Apr 2025 05:30  Phos  3.0     04-10  Mg     1.7     04-10      04-09    134[L]  |  96  |  20[H]  ----------------------------<  79  3.0[L]   |  30  |  0.90    Ca    9.5      09 Apr 2025 06:10  Phos  3.0     04-09  Mg     1.7     04-09      TPro  6.4  /  Alb  2.2[L]  /  TBili  0.4  /  DBili  x   /  AST  32  /  ALT  35  /  AlkPhos  111  04-07    PT/INR - ( 06 Apr 2025 11:45 )   PT: 10.9 sec;   INR: 0.93 ratio      PTT - ( 06 Apr 2025 11:45 )  PTT:31.6 sec      MEDICATIONS  (STANDING):    amLODIPine   Tablet 5 milliGRAM(s) Oral daily  fluticasone propionate/ salmeterol 100-50 MICROgram(s) Diskus 1 Dose(s) Inhalation two times a day  heparin   Injectable 5000 Unit(s) SubCutaneous every 8 hours  levothyroxine 88 MICROGram(s) Oral daily  lidocaine   4% Patch 3 Patch Transdermal daily  montelukast 10 milliGRAM(s) Oral at bedtime  naloxone Injectable 0.4 milliGRAM(s) IV Push once  piperacillin/tazobactam IVPB.. 3.375 Gram(s) IV Intermittent every 8 hours  polyethylene glycol 3350 17 Gram(s) Oral daily  senna 2 Tablet(s) Oral at bedtime        RADIOLOGY & ADDITIONAL TESTS:    4/6/25: Xray Femur 2 Views, Left (04.06.25 @ 15:42) IMPRESSION: Subcapital fracture of the left hip. 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      4/6/25: CT Cervical Spine No Cont (04.06.25 @ 13:37) >  IMPRESSION:  CT HEAD : No traumatic intracranial abnormality.    Questionable small left frontal encephalocele extending into the left ethmoid sinus and slightly protruding into the left superomedial orbit.   Recommend MRI of the brain seizure protocol without and with contrast for further evaluation.    Bilateral sphenoid sinus disease.    CT CERVICAL SPINE:    No acute fracture or traumatic malalignment.  Multilevel degenerative changes.      MICROBIOLOGY DATA:    Culture - Blood (04.06.25 @ 11:55)   Specimen Source: Blood Blood  Culture Results: No growth at 5 days    Culture - Blood (04.06.25 @ 11:45)   Specimen Source: Blood Blood  Culture Results: No growth at 5 days      Urine Microscopic-Add On (NC) (04.06.25 @ 11:55)   White Blood Cell - Urine: 2 /HPF  Red Blood Cell - Urine: 2 /HPF  Bacteria: Moderate /HPF  Squamous Epithelial Cells: Present                      Patient is seen and examined at the bed side, is afebrile. She is tolerating Zosyn well.      REVIEW OF SYSTEMS: All other review systems are negative      ALLERGIES: No Known Allergies      Vital Signs Last 24 Hrs  T(C): 36.2 (13 Apr 2025 12:25), Max: 36.3 (12 Apr 2025 20:38)  T(F): 97.2 (13 Apr 2025 12:25), Max: 97.3 (12 Apr 2025 20:38)  HR: 84 (13 Apr 2025 15:36) (80 - 85)  BP: 122/82 (13 Apr 2025 15:36) (110/82 - 122/82)  BP(mean): 87 (13 Apr 2025 12:25) (87 - 87)  RR: 18 (13 Apr 2025 15:36) (17 - 18)  SpO2: 96% (13 Apr 2025 15:36) (95% - 96%)    Parameters below as of 13 Apr 2025 15:36  Patient On (Oxygen Delivery Method): room air      PHYSICAL EXAM:  GENERAL: Not in acute distress  CHEST/LUNG:  Air entry bilaterally  HEART: s1 and s2 present  ABDOMEN:  Nontender and  Nondistended  EXTREMITIES: No pedal  edema  CNS: Awake and Alert      LABS: No new Labs                           11.9   7.85  )-----------( 197      ( 10 Apr 2025 05:30 )             37.8                         12.5   11.64 )-----------( 219      ( 09 Apr 2025 06:10 )             39.3         04-10    136  |  100  |  24[H]  ----------------------------<  95  3.1[L]   |  29  |  0.87    Ca    9.5      10 Apr 2025 05:30  Phos  3.0     04-10  Mg     1.7     04-10      04-09    134[L]  |  96  |  20[H]  ----------------------------<  79  3.0[L]   |  30  |  0.90    Ca    9.5      09 Apr 2025 06:10  Phos  3.0     04-09  Mg     1.7     04-09      TPro  6.4  /  Alb  2.2[L]  /  TBili  0.4  /  DBili  x   /  AST  32  /  ALT  35  /  AlkPhos  111  04-07    PT/INR - ( 06 Apr 2025 11:45 )   PT: 10.9 sec;   INR: 0.93 ratio      PTT - ( 06 Apr 2025 11:45 )  PTT:31.6 sec      MEDICATIONS  (STANDING):    amLODIPine   Tablet 5 milliGRAM(s) Oral daily  fluticasone propionate/ salmeterol 100-50 MICROgram(s) Diskus 1 Dose(s) Inhalation two times a day  heparin   Injectable 5000 Unit(s) SubCutaneous every 8 hours  levothyroxine 88 MICROGram(s) Oral daily  lidocaine   4% Patch 3 Patch Transdermal daily  montelukast 10 milliGRAM(s) Oral at bedtime  naloxone Injectable 0.4 milliGRAM(s) IV Push once  piperacillin/tazobactam IVPB.. 3.375 Gram(s) IV Intermittent every 8 hours  polyethylene glycol 3350 17 Gram(s) Oral daily  senna 2 Tablet(s) Oral at bedtime        RADIOLOGY & ADDITIONAL TESTS:    4/6/25: Xray Femur 2 Views, Left (04.06.25 @ 15:42) IMPRESSION: Subcapital fracture of the left hip. 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      4/6/25: CT Cervical Spine No Cont (04.06.25 @ 13:37) >  IMPRESSION:  CT HEAD : No traumatic intracranial abnormality.    Questionable small left frontal encephalocele extending into the left ethmoid sinus and slightly protruding into the left superomedial orbit.   Recommend MRI of the brain seizure protocol without and with contrast for further evaluation.    Bilateral sphenoid sinus disease.    CT CERVICAL SPINE:    No acute fracture or traumatic malalignment.  Multilevel degenerative changes.      MICROBIOLOGY DATA:    Culture - Blood (04.06.25 @ 11:55)   Specimen Source: Blood Blood  Culture Results: No growth at 5 days    Culture - Blood (04.06.25 @ 11:45)   Specimen Source: Blood Blood  Culture Results: No growth at 5 days      Urine Microscopic-Add On (NC) (04.06.25 @ 11:55)   White Blood Cell - Urine: 2 /HPF  Red Blood Cell - Urine: 2 /HPF  Bacteria: Moderate /HPF  Squamous Epithelial Cells: Present

## 2025-04-13 NOTE — PROGRESS NOTE ADULT - SUBJECTIVE AND OBJECTIVE BOX
Patient is a 89y old  Female who presents with a chief complaint of fall (12 Apr 2025 14:36)    pt seen in icu [  ], reg med floor [ x  ], bed [x  ], chair at bedside [   ], a+o x3 [x  ], lethargic [  ],  nad [ x ]      Allergies    No Known Allergies        Vitals    T(F): 97.2 (04-13-25 @ 05:10), Max: 97.3 (04-12-25 @ 20:38)  HR: 85 (04-13-25 @ 05:10) (72 - 86)  BP: 110/82 (04-13-25 @ 05:10) (109/59 - 121/74)  RR: 18 (04-13-25 @ 05:10) (17 - 18)  SpO2: 95% (04-13-25 @ 05:10) (95% - 95%)  Wt(kg): --  CAPILLARY BLOOD GLUCOSE          Labs                      Blood Blood  04-06 @ 11:55   No growth at 5 days  --  --      Blood Blood  04-06 @ 11:45   No growth at 5 days  --  --          Radiology Results      Meds    MEDICATIONS  (STANDING):  amLODIPine   Tablet 5 milliGRAM(s) Oral daily  fluticasone propionate/ salmeterol 100-50 MICROgram(s) Diskus 1 Dose(s) Inhalation two times a day  heparin   Injectable 5000 Unit(s) SubCutaneous every 8 hours  levothyroxine 88 MICROGram(s) Oral daily  lidocaine   4% Patch 3 Patch Transdermal daily  montelukast 10 milliGRAM(s) Oral at bedtime  naloxone Injectable 0.4 milliGRAM(s) IV Push once  piperacillin/tazobactam IVPB.. 3.375 Gram(s) IV Intermittent every 8 hours  polyethylene glycol 3350 17 Gram(s) Oral daily  senna 2 Tablet(s) Oral at bedtime      MEDICATIONS  (PRN):  albuterol    90 MICROgram(s) HFA Inhaler 2 Puff(s) Inhalation every 6 hours PRN Shortness of Breath and/or Wheezing  bisacodyl 5 milliGRAM(s) Oral daily PRN Constipation  ondansetron Injectable 4 milliGRAM(s) IV Push every 8 hours PRN Nausea and/or Vomiting  oxyCODONE    IR 2.5 milliGRAM(s) Oral every 4 hours PRN Moderate Pain (4 - 6)  oxyCODONE    IR 5 milliGRAM(s) Oral every 4 hours PRN Severe Pain (7 - 10)      Physical Exam    Neuro :  no focal deficits  Respiratory: CTA B/L  CV: RRR, S1S2, no murmurs,   Abdominal: Soft, NT, ND +BS,  Extremities: No edema, + peripheral pulses    ASSESSMENT    left femoral neck fx,   bronchiectasis with karen,   licha,    demand ischemia   left frontal encephalocele,   sinusitis   h/o HTN,   hypothyroidism,   COPD,   ?dementia       PLAN    cont pain control,   pain mgmt f/u   Opioid pain recommendations   - Continue Oxycodone 2.5/5 mg PO q4h PRN moderate/severe pain. Monitor for sedation/ respiratory depression.   Non-opioid pain recommendations   - completed Acetaminophen 1 gram PO q 8 hours x 4 days. Monitor LFT's  - Continue Lidocaine 4% patches x 3 daily.  Bowel Regimen  - Continue Miralax 17G PO daily  - Continue Senna 2 tablets at bedtime for constipation  Mild pain   - Non-pharmacological pain treatment recommendations  - Warm/ Cool packs PRN   - Repositioning extremity, guided imagery, relaxation, distraction.  - Physical therapy OOB if no contraindications   mri left hip with Acute nondisplaced fracture of the left femoral head/left   subcapital femoral neck noted    ortho f/u      -  MRI results for L Hip reviewed    -  HCP patients niece Nataly informed of risks and benefits of surgical vs conservative management, all questions and concerns answered. HCP refusing surgical intervention.   -  Recommendation: Conservative management , no surgical orthopedic intervention is recommended at this time.  -  DVT prophylaxis with heparin and Venodynes  -  Daily PT: PWB to LLE at this time, OOB with assistive device  phys tx eval noted and rec phys tx daily 4-6 weeks in [facility]  id f/u   Continue Zosyn to treat sinusitis while inpatient,   may change to oral Augmentin 875 mg q 12hours on discharge to continue until 4/19/25  pulm f/u    Bronchodilators  Budesonide 0.25 mgs one unit  dose neb BID  Montelukast 10 mgs po Qhs.  serum creat wnl   potassium  wnl  phos wnl  trop trending down   cardio f/u   cont norvasc   echo  ejection fraction of 51 % mild (grade 1) left ventricular diastolic dysfunction noted above.  mri with Questionable small left frontal encephalocele, as noted on recent CT.   Given the size and proximity of the possible defect to the superior   medial left orbit, would recommend further evaluation with an MRI and CT   of the orbits and thin cuts noted     neuro f/u   incidental small left frontal encephalocele, without evidence of acute intracranial abnormality,    - No role for neurosurgical intervention for incidental finding of small left frontal encephalocele  tsh elevated 20.5  ft4 1.1   endo f/u  cont lt4 to 88 mcg  tfts in 4-6 wks  cont current meds   d/c [facility] pending acceptance

## 2025-04-13 NOTE — PROGRESS NOTE ADULT - SUBJECTIVE AND OBJECTIVE BOX
Date of Service 04-13-25 @ 11:30    CHIEF COMPLAINT:Patient is a 89y old  Female who presents with a chief complaint of fall .Pt appears comfortable.    	  REVIEW OF SYSTEMS:  CONSTITUTIONAL: No fever, weight loss, or fatigue  EYES: No eye pain, visual disturbances, or discharge  ENT:  No difficulty hearing, tinnitus, vertigo; No sinus or throat pain  NECK: No pain or stiffness  RESPIRATORY: No cough, wheezing, chills or hemoptysis; No Shortness of Breath  CARDIOVASCULAR: No chest pain, palpitations, passing out, dizziness, or leg swelling  GASTROINTESTINAL: No abdominal or epigastric pain. No nausea, vomiting, or hematemesis; No diarrhea or constipation. No melena or hematochezia.  GENITOURINARY: No dysuria, frequency, hematuria, or incontinence  NEUROLOGICAL: No headaches, memory loss, loss of strength, numbness, or tremors  SKIN: No itching, burning, rashes, or lesions   LYMPH Nodes: No enlarged glands  ENDOCRINE: No heat or cold intolerance; No hair loss  MUSCULOSKELETAL: No joint pain or swelling; No muscle, back, or extremity pain  PSYCHIATRIC: No depression, anxiety, mood swings, or difficulty sleeping  HEME/LYMPH: No easy bruising, or bleeding gums  ALLERGY AND IMMUNOLOGIC: No hives or eczema	    PHYSICAL EXAM:  T(C): 36.2 (04-13-25 @ 05:10), Max: 36.3 (04-12-25 @ 20:38)  HR: 85 (04-13-25 @ 05:10) (82 - 86)  BP: 110/82 (04-13-25 @ 05:10) (110/82 - 121/74)  RR: 18 (04-13-25 @ 05:10) (17 - 18)  SpO2: 95% (04-13-25 @ 05:10) (95% - 95%)  Wt(kg): --  I&O's Summary      Appearance: Normal	  HEENT:   Normal oral mucosa, PERRL, EOMI	  Lymphatic: No lymphadenopathy  Cardiovascular: Normal S1 S2, No JVD, No murmurs, No edema  Respiratory: Lungs clear to auscultation	  Psychiatry: A & O x 3, Mood & affect appropriate  Gastrointestinal:  Soft, Non-tender, + BS	  Skin: No rashes, No ecchymoses, No cyanosis	  Neurologic: Non-focal  Extremities: Normal range of motion, No clubbing, cyanosis or edema  Vascular: Peripheral pulses palpable 2+ bilaterally    MEDICATIONS  (STANDING):  amLODIPine   Tablet 5 milliGRAM(s) Oral daily  fluticasone propionate/ salmeterol 100-50 MICROgram(s) Diskus 1 Dose(s) Inhalation two times a day  heparin   Injectable 5000 Unit(s) SubCutaneous every 8 hours  levothyroxine 88 MICROGram(s) Oral daily  lidocaine   4% Patch 3 Patch Transdermal daily  montelukast 10 milliGRAM(s) Oral at bedtime  naloxone Injectable 0.4 milliGRAM(s) IV Push once  piperacillin/tazobactam IVPB.. 3.375 Gram(s) IV Intermittent every 8 hours  polyethylene glycol 3350 17 Gram(s) Oral daily  senna 2 Tablet(s) Oral at bedtime        	  LABS:	 	      TSH: Thyroid Stimulating Hormone, Serum: 20.50 uU/mL (04-07 @ 05:25)

## 2025-04-13 NOTE — PROGRESS NOTE ADULT - ASSESSMENT
Patient is a 89y old  Female who is from home, ambulates with cane, and PMH of HTN, hypothyroidism, COPD, ?dementia. now Presents to the ER for evaluation of A fall at home, was found by [facility] on the floor. Unknown down time. In ER, patient AAOx2 (to self, place). States that she had a fall while attempting to stand up, fell and landed on her side. Denies head trauma. On admission, she found to have no fever but Subcapital fracture of the left hip and Bilateral sphenoid sinus disease.  The CT chest shows Bronchiectasis, hence the ID consult requested to assist with further evaluation and rule out ROWDY.    # Bilateral sphenoid sinusitis  # Bronchiectasis, R/O ROWDY - unable to  produce Sputum even with " sputum Induction" by Respiratory team and  Family does not want any invasive procedure such as Bronchoscopy   # Subcapital fracture of the left hip after  A fall - Patient's HCP refusing surgical intervention of  Subcapital fracture of the left hip.    would recommend:    1. PT/ OOB to chair  2. Continue Zosyn to treat sinusitis while inpatient, may change to oral Augmentin 875 mg q 12hours on discharge to continue until 4/19/25  3. Pain Management as needed    Attending Attestation:    Spent more than 35 minutes on total encounter, more than 50 % of the visit was spent counseling and/or coordinating care by the Attending physician.     Patient is a 89y old  Female who is from home, ambulates with cane, and PMH of HTN, hypothyroidism, COPD, ?dementia. now Presents to the ER for evaluation of A fall at home, was found by [facility] on the floor. Unknown down time. In ER, patient AAOx2 (to self, place). States that she had a fall while attempting to stand up, fell and landed on her side. Denies head trauma. On admission, she found to have no fever but Subcapital fracture of the left hip and Bilateral sphenoid sinus disease.  The CT chest shows Bronchiectasis, hence the ID consult requested to assist with further evaluation and rule out ROWDY.    # Bilateral sphenoid sinusitis  # Bronchiectasis, R/O ROWDY - unable to  produce Sputum even with " sputum Induction" by Respiratory team and  Family does not want any invasive procedure such as Bronchoscopy   # Subcapital fracture of the left hip after  A fall - Patient's HCP refusing surgical intervention of  Subcapital fracture of the left hip.    would recommend:    1. Labs in AM please  2. Continue Zosyn to treat sinusitis while inpatient, may change to oral Augmentin 875 mg q 12hours on discharge to continue until 4/19/25  3. Pain Management as needed    d/w covering Lor MURARY    Attending Attestation:    Spent more than 35 minutes on total encounter, more than 50 % of the visit was spent counseling and/or coordinating care by the Attending physician.

## 2025-04-13 NOTE — PROGRESS NOTE ADULT - SUBJECTIVE AND OBJECTIVE BOX
Patient is a 89y old  Female who presents with a chief complaint of fall (13 Apr 2025 07:37)  Awake, alert, lying in bed in NAD. Remains on RA. Awaiting [facility]    INTERVAL HPI/OVERNIGHT EVENTS:      VITAL SIGNS:  T(F): 97.2 (04-13-25 @ 05:10)  HR: 85 (04-13-25 @ 05:10)  BP: 110/82 (04-13-25 @ 05:10)  RR: 18 (04-13-25 @ 05:10)  SpO2: 95% (04-13-25 @ 05:10)  Wt(kg): --  I&O's Detail          REVIEW OF SYSTEMS:    CONSTITUTIONAL:  No fevers, chills, sweats    HEENT:  Eyes:  No diplopia or blurred vision. ENT:  No earache, sore throat or runny nose.    CARDIOVASCULAR:  No pressure, squeezing, tightness, or heaviness about the chest; no palpitations.    RESPIRATORY:  Per HPI    GASTROINTESTINAL:  No abdominal pain, nausea, vomiting or diarrhea.    GENITOURINARY:  No dysuria, frequency or urgency.    NEUROLOGIC:  No paresthesias, fasciculations, seizures or weakness.    PSYCHIATRIC:  No disorder of thought or mood.      PHYSICAL EXAM:    Constitutional: Well developed and nourished  Eyes:Perrla  ENMT: normal  Neck:supple  Respiratory: Rales B/L  Cardiovascular: S1 S2 regular  Gastrointestinal: Soft, Non tender  Extremities: No edema  Vascular:normal  Neurological:Awake, alert,Ox3  Musculoskeletal:Normal      MEDICATIONS  (STANDING):  amLODIPine   Tablet 5 milliGRAM(s) Oral daily  fluticasone propionate/ salmeterol 100-50 MICROgram(s) Diskus 1 Dose(s) Inhalation two times a day  heparin   Injectable 5000 Unit(s) SubCutaneous every 8 hours  levothyroxine 88 MICROGram(s) Oral daily  lidocaine   4% Patch 3 Patch Transdermal daily  montelukast 10 milliGRAM(s) Oral at bedtime  naloxone Injectable 0.4 milliGRAM(s) IV Push once  piperacillin/tazobactam IVPB.. 3.375 Gram(s) IV Intermittent every 8 hours  polyethylene glycol 3350 17 Gram(s) Oral daily  senna 2 Tablet(s) Oral at bedtime    MEDICATIONS  (PRN):  albuterol    90 MICROgram(s) HFA Inhaler 2 Puff(s) Inhalation every 6 hours PRN Shortness of Breath and/or Wheezing  bisacodyl 5 milliGRAM(s) Oral daily PRN Constipation  ondansetron Injectable 4 milliGRAM(s) IV Push every 8 hours PRN Nausea and/or Vomiting  oxyCODONE    IR 2.5 milliGRAM(s) Oral every 4 hours PRN Moderate Pain (4 - 6)  oxyCODONE    IR 5 milliGRAM(s) Oral every 4 hours PRN Severe Pain (7 - 10)      Allergies    No Known Allergies    Intolerances        LABS:                    CAPILLARY BLOOD GLUCOSE            RADIOLOGY & ADDITIONAL TESTS:    CXR:    Ct scan chest:    ekg;    echo:

## 2025-04-13 NOTE — PROGRESS NOTE ADULT - ASSESSMENT
89F, from home, ambulates with cane, PMH HTN, hypothyroidism, COPD, ?dementia. Presenting after fall at home,Lt hip fx,pneumonia.  1.D/C to [facility].  2.MR-Lt hip-+ fx,HCP declining surgery.  3.HTN-norvasc.  4.Hypothyroid-synthroid.  5.COPD-management as per Pulm.  6.Pneumonia-abx.  7.GI and DVT prophylaxis.

## 2025-04-14 DIAGNOSIS — F03.90 UNSPECIFIED DEMENTIA, UNSPECIFIED SEVERITY, WITHOUT BEHAVIORAL DISTURBANCE, PSYCHOTIC DISTURBANCE, MOOD DISTURBANCE, AND ANXIETY: ICD-10-CM

## 2025-04-14 RX ORDER — OXYCODONE HYDROCHLORIDE 30 MG/1
2.5 TABLET ORAL EVERY 4 HOURS
Refills: 0 | Status: DISCONTINUED | OUTPATIENT
Start: 2025-04-14 | End: 2025-04-15

## 2025-04-14 RX ORDER — OXYCODONE HYDROCHLORIDE 30 MG/1
5 TABLET ORAL EVERY 4 HOURS
Refills: 0 | Status: DISCONTINUED | OUTPATIENT
Start: 2025-04-14 | End: 2025-04-15

## 2025-04-14 RX ORDER — HEPARIN SODIUM 1000 [USP'U]/ML
5000 INJECTION INTRAVENOUS; SUBCUTANEOUS EVERY 12 HOURS
Refills: 0 | Status: DISCONTINUED | OUTPATIENT
Start: 2025-04-15 | End: 2025-04-15

## 2025-04-14 RX ADMIN — Medication 25 GRAM(S): at 08:19

## 2025-04-14 RX ADMIN — Medication 88 MICROGRAM(S): at 06:07

## 2025-04-14 RX ADMIN — Medication 25 GRAM(S): at 01:00

## 2025-04-14 RX ADMIN — LIDOCAINE HYDROCHLORIDE 3 PATCH: 20 JELLY TOPICAL at 12:50

## 2025-04-14 RX ADMIN — LIDOCAINE HYDROCHLORIDE 3 PATCH: 20 JELLY TOPICAL at 19:43

## 2025-04-14 RX ADMIN — HEPARIN SODIUM 5000 UNIT(S): 1000 INJECTION INTRAVENOUS; SUBCUTANEOUS at 13:51

## 2025-04-14 RX ADMIN — LIDOCAINE HYDROCHLORIDE 3 PATCH: 20 JELLY TOPICAL at 00:27

## 2025-04-14 RX ADMIN — AMLODIPINE BESYLATE 5 MILLIGRAM(S): 10 TABLET ORAL at 06:08

## 2025-04-14 RX ADMIN — MONTELUKAST SODIUM 10 MILLIGRAM(S): 10 TABLET ORAL at 21:02

## 2025-04-14 RX ADMIN — Medication 1 DOSE(S): at 21:03

## 2025-04-14 RX ADMIN — Medication 1 DOSE(S): at 09:19

## 2025-04-14 RX ADMIN — HEPARIN SODIUM 5000 UNIT(S): 1000 INJECTION INTRAVENOUS; SUBCUTANEOUS at 06:07

## 2025-04-14 RX ADMIN — Medication 25 GRAM(S): at 17:49

## 2025-04-14 RX ADMIN — Medication 25 GRAM(S): at 23:53

## 2025-04-14 NOTE — PROGRESS NOTE ADULT - PROBLEM SELECTOR PROBLEM 7
HTN (hypertension)
FLAVIO (acute kidney injury)
FLAVIO (acute kidney injury)
HTN (hypertension)

## 2025-04-14 NOTE — PROGRESS NOTE ADULT - PROBLEM SELECTOR PLAN 8
Resolved
EKG with bradycardia 40-50s bpm  hold home med atenolol
Resolved

## 2025-04-14 NOTE — PROGRESS NOTE ADULT - PROBLEM SELECTOR PROBLEM 9
Consult Cardiology    [facility] 1950, 74 y.o. female MRN: [id number]    Unit/Bed#: -01 Encounter: [id number]    Attending Provider: Leatha Luis DO   Primary Care Provider: Ketan Mahmood MD   Date admitted to hospital: 9/27/2024       Inpatient consult to Cardiology  Consult performed by: ERAN Fair  Consult ordered by: Alexis Jaime PA-C          * Atrial fibrillation with rapid ventricular response (HCC)  Assessment & Plan  Recent diagnosed of paroxysmal rapid a fib on ZIO monitoring.  Started on Xarelto 20 mg daily and metoprolol succinate 25 mg daily on 7/16/2024. Referred to EP with outpatient consult pending.  Echo 5/2024 with preserved LVEF, mild LA enlargement, and no significant valvular abnormality.  Now presents in symptomatic rapid a fib with HR's 180's not responding to IV Lopressor, dig, or diltiazem. Cardioversion attempts x 4 unsuccessful.   Start amiodarone load with bolus of 150 mg followed by drip at 1 mg/hr x 6 hours then 0.5 mg/hr.  Wean diltiazem drip as able.  Continue metoprolol succinate 25 mg daily and titrate as needed.  Continue Xarelto 20 mg daily.  Check TSH.  Optimize electrolytes for K+ >4, Mag >2.    Diastolic heart failure (HCC)  Assessment & Plan  Wt Readings from Last 3 Encounters:   09/27/24 66.1 kg (145 lb 11.6 oz)   08/29/24 65.9 kg (145 lb 3.2 oz)   07/15/24 65 kg (143 lb 3.2 oz)     Secondary to rapid rates. See discussion under a fib.  Echo 5/2024 with preserved LVEF   Responding well to IV furosemide 20 mg x 1 dose. Will monitor volume status.  Strict I's/O's.  Optimize electrolytes.          H/O ischemic right MCA stroke  Assessment & Plan  Admitted with acute stroke in May 2024.  Echo unremarkable at the time.  Found to have paroxysmal rapid a fib on ZIO monitor and started on metoprolol and Xarelto 7/16/2024        Physician Requesting Consult: Leatha Luis DO    Reason for Consult / Principal Problem: Atrial 
fibrillation with RVR      HPI: Radha Dodson is a 74 y.o. year old female who has a history of recent right MCA stroke with residual left hemiparesis and facial droop, atrial fibrillation on Xarelto, prediabetes, and hypertriglyceridemia who follows with cardiologist Dr. Sohan Hightower with [facility].           She met with Cardiology 6/17/24 for evaluation after her stroke. No prior cardiac history. Echo 5/16/2024 showed LVEF 55% with mild LA enlargement and mild MR. 2 week ZIO monitor confirmed paroxysmal rapid atrial fibrillation. She was started on Xarelto 20 mg daily for stroke prevention and referred to electrophysiology on 7/16/2024.     Patient presented to [facility] ER early this morning 9/27/2024 with acute shortness of breath which started abruptly yesterday evening. Upon ER arrival ECG confirmed rapid atrial fibrillation with HR in the 180-190's. BP was stable. HS trop WNL at 39->44. BNP elevated at 854. CMP with mild GEGE and minimal LFT elevation. Cbc unremarkable. Lactic 4.2. Chest xray showed vascular congestion. She was given IV Lopressor, IV dig, IV dilt with no HR response. Ultimately ER contacted cardiology on-call and cardioversion was attempted x4 unsuccessfully. She was ultimately started on a diltiazem drip and admitted to the ICU.    At the time of my evaluation she is sitting on a bedside commode with HR in the 180's. She is visibly dyspneic and assisted back to bed. She adamantly denies lightheadedness or chest discomfort. She states she actually thinks she feels better than when she came in. She states symptoms came on abruptly yesterday evening. She has been taking her medications faithfully. She is maintained on Xarelto 20 mg daily and metoprolol succinate 25 mg daily outpatient. She states she has been in her usual state of health. No ETOH/substance use.       Review of Systems   Constitutional:  Negative for chills and fever.   HENT:  Negative for ear pain and sore 
throat.    Eyes:  Negative for pain and visual disturbance.   Respiratory:  Positive for shortness of breath. Negative for cough.    Cardiovascular:  Negative for chest pain and palpitations.   Gastrointestinal:  Negative for abdominal pain and vomiting.   Genitourinary:  Negative for dysuria and hematuria.   Musculoskeletal:  Negative for arthralgias and back pain.   Skin:  Negative for color change and rash.   Neurological:  Negative for seizures and syncope.   All other systems reviewed and are negative.       Historical Information     Past Medical History:   Diagnosis Date    High cholesterol     Stroke (HCC)      Past Surgical History:   Procedure Laterality Date    IR STROKE ALERT  5/15/2024     Social History     Substance and Sexual Activity   Alcohol Use Yes    Comment: Social     Social History     Substance and Sexual Activity   Drug Use Never     Social History     Tobacco Use   Smoking Status Never   Smokeless Tobacco Never       Family History:   Family History   Problem Relation Age of Onset    Atrial fibrillation Mother     Lung cancer Father        Meds/Allergies     current meds:   Current Facility-Administered Medications:     amiodarone (CORDARONE) 900 mg in dextrose 5 % 500 mL infusion, Continuous, Last Rate: 1 mg/min (09/27/24 0846) **FOLLOWED BY** amiodarone (CORDARONE) 900 mg in dextrose 5 % 500 mL infusion, Continuous    atorvastatin (LIPITOR) tablet 40 mg, Daily With Dinner    calcium gluconate 2 g in sodium chloride 0.9% 100 mL (premix), Once, Last Rate: 2 g (09/27/24 0908)    chlorhexidine (PERIDEX) 0.12 % oral rinse 15 mL, Q12H CAMPOS    diltiazem (CARDIZEM) 125 mg in sodium chloride 0.9 % 125 mL infusion, Titrated, Last Rate: 7.5 mg/hr (09/27/24 0901)    insulin lispro (HumALOG/ADMELOG) 100 units/mL subcutaneous injection 1-5 Units, Q6H CAMPOS **AND** Fingerstick Glucose (POCT), Q6H    metoprolol (LOPRESSOR) injection 5 mg, Q6H PRN    metoprolol succinate (TOPROL-XL) 24 hr tablet 25 mg, 
Daily    rivaroxaban (XARELTO) tablet 15 mg, Daily With Breakfast  amiodarone (CORDARONE) 900 mg in dextrose 5 % 500 mL infusion, 1 mg/min, Last Rate: 1 mg/min (24 0846)   Followed by  amiodarone (CORDARONE) 900 mg in dextrose 5 % 500 mL infusion, 0.5 mg/min  diltiazem, 1-15 mg/hr, Last Rate: 7.5 mg/hr (24 0901)        No Known Allergies    Objective     Vitals: Blood pressure 158/94, pulse (!) 160, temperature 97.5 °F (36.4 °C), temperature source Temporal, resp. rate (!) 34, height 5' (1.524 m), weight 66.1 kg (145 lb 11.6 oz), SpO2 94%., Body mass index is 28.46 kg/m².    Orthostatic Blood Pressures      Flowsheet Row Most Recent Value   Blood Pressure 158/94 filed at 2024 0830   Patient Position - Orthostatic VS Lying filed at 2024 0830            Systolic (24hrs), Av , Min:89 , Max:158     Diastolic (24hrs), Av, Min:58, Max:101        Intake/Output Summary (Last 24 hours) at 2024 0957  Last data filed at 2024 0901  Gross per 24 hour   Intake 922.21 ml   Output 1350 ml   Net -427.79 ml       Weight (last 2 days)       Date/Time Weight    24 0555 66.1 (145.72)    24 0204 69.3 (152.78)            Invasive Devices       Peripheral Intravenous Line  Duration             Peripheral IV 24 Left;Proximal;Ventral (anterior) Forearm <1 day    Peripheral IV 24 Right Antecubital <1 day              Drain  Duration             External Urinary Catheter <1 day                    Physical Exam  Vitals and nursing note reviewed.   Constitutional:       General: She is not in acute distress.     Appearance: She is well-developed. She is not ill-appearing.   HENT:      Head: Normocephalic and atraumatic.   Eyes:      Conjunctiva/sclera: Conjunctivae normal.   Cardiovascular:      Rate and Rhythm: Tachycardia present. Rhythm irregularly irregular.      Heart sounds: No murmur heard.  Pulmonary:      Effort: Pulmonary effort is normal. No respiratory distress.      
Breath sounds: Examination of the right-lower field reveals decreased breath sounds. Examination of the left-lower field reveals decreased breath sounds. Decreased breath sounds present.   Abdominal:      Palpations: Abdomen is soft.      Tenderness: There is no abdominal tenderness.   Musculoskeletal:         General: No swelling.      Cervical back: Neck supple.      Right lower leg: No edema.      Left lower leg: No edema.   Skin:     General: Skin is warm and dry.      Capillary Refill: Capillary refill takes less than 2 seconds.   Neurological:      Mental Status: She is alert.   Psychiatric:         Mood and Affect: Mood normal.              Laboratory Results:          CBC with diff:   Results from last 7 days   Lab Units 09/27/24  0635 09/27/24 0210   WBC Thousand/uL 12.40* 8.47   HEMOGLOBIN g/dL 14.8 14.3   HEMATOCRIT % 44.1 44.1   MCV fL 90 93   PLATELETS Thousands/uL 347 344   RBC Million/uL 4.88 4.77   MCH pg 30.3 30.0   MCHC g/dL 33.6 32.4   RDW % 13.3 13.5   MPV fL 10.3 10.7   NRBC AUTO /100 WBCs  --  0         CMP:  Results from last 7 days   Lab Units 09/27/24  0635 09/27/24  0210   POTASSIUM mmol/L 4.1 4.1   CHLORIDE mmol/L 105 103   CO2 mmol/L 22 22   BUN mg/dL 24 24   CREATININE mg/dL 1.26 1.34*   CALCIUM mg/dL 9.2 8.8   AST U/L  --  46*   ALT U/L  --  58*   ALK PHOS U/L  --  91   EGFR ml/min/1.73sq m 42 39       BMP:  Results from last 7 days   Lab Units 09/27/24  0635 09/27/24  0210   POTASSIUM mmol/L 4.1 4.1   CHLORIDE mmol/L 105 103   CO2 mmol/L 22 22   BUN mg/dL 24 24   CREATININE mg/dL 1.26 1.34*   CALCIUM mg/dL 9.2 8.8       BNP:    Recent Labs     09/27/24 0210   *     HS troponin: 39->44    Magnesium:   Results from last 7 days   Lab Units 09/27/24  0635 09/27/24 0210   MAGNESIUM mg/dL 2.0 2.1       Coags:   Results from last 7 days   Lab Units 09/27/24  0210   PTT seconds 30   INR  1.86*       TSH:   ordered and pending    Lipid Profile:   Lab Results   Component Value Date    
Hypothyroidism
CHOLESTEROL 180 05/15/2024     Lab Results   Component Value Date    HDL 45 (L) 05/15/2024     Lab Results   Component Value Date    TRIG 179 (H) 05/15/2024     Lab Results   Component Value Date    NONHDLC 135 05/15/2024        Cardiac testing:     Echocardiogram done at our campus 5/2024 showing preserved EF with mild LVH, mild LA enlargement, and mild MR.    Imaging: Reviewed radiology reports from this admission including: chest xray.    Reviewed chest xray PACS imaging    EKG reviewed personally: EKG: Atrial fibrillation with rapid ventricular response. LAD. ST/T wave abnormality in the inferior and anterolateral leads.     Telemetry: Atrial fibrillation, rates 150-180's      Code Status: Level 1 - Full Code    
Demand ischemia
Hypothyroidism
Demand ischemia
Hypothyroidism
Hypothyroidism

## 2025-04-14 NOTE — PROGRESS NOTE ADULT - SUBJECTIVE AND OBJECTIVE BOX
Date of Service 04-14-25 @ 10:53    CHIEF COMPLAINT:Patient is a 89y old  Female who presents with a chief complaint of fall .Pt appears comfortable.    	  REVIEW OF SYSTEMS:  CONSTITUTIONAL: No fever, weight loss, or fatigue  EYES: No eye pain, visual disturbances, or discharge  ENT:  No difficulty hearing, tinnitus, vertigo; No sinus or throat pain  NECK: No pain or stiffness  RESPIRATORY: No cough, wheezing, chills or hemoptysis; No Shortness of Breath  CARDIOVASCULAR: No chest pain, palpitations, passing out, dizziness, or leg swelling  GASTROINTESTINAL: No abdominal or epigastric pain. No nausea, vomiting, or hematemesis; No diarrhea or constipation. No melena or hematochezia.  GENITOURINARY: No dysuria, frequency, hematuria, or incontinence  NEUROLOGICAL: No headaches, memory loss, loss of strength, numbness, or tremors  SKIN: No itching, burning, rashes, or lesions   LYMPH Nodes: No enlarged glands  ENDOCRINE: No heat or cold intolerance; No hair loss  MUSCULOSKELETAL: No joint pain or swelling; No muscle, back, or extremity pain  PSYCHIATRIC: No depression, anxiety, mood swings, or difficulty sleeping  HEME/LYMPH: No easy bruising, or bleeding gums  ALLERGY AND IMMUNOLOGIC: No hives or eczema	        PHYSICAL EXAM:  T(C): 36.6 (04-14-25 @ 05:10), Max: 36.6 (04-13-25 @ 20:49)  HR: 95 (04-14-25 @ 05:10) (80 - 98)  BP: 123/67 (04-14-25 @ 05:10) (112/75 - 123/67)  RR: 18 (04-14-25 @ 05:10) (18 - 18)  SpO2: 94% (04-14-25 @ 05:10) (94% - 96%)  Wt(kg): --  I&O's Summary    13 Apr 2025 07:01  -  14 Apr 2025 07:00  --------------------------------------------------------  IN: 150 mL / OUT: 0 mL / NET: 150 mL        Appearance: Normal	  HEENT:   Normal oral mucosa, PERRL, EOMI	  Lymphatic: No lymphadenopathy  Cardiovascular: Normal S1 S2, No JVD, No murmurs, No edema  Respiratory: Lungs clear to auscultation	  Psychiatry: A & O x 3, Mood & affect appropriate  Gastrointestinal:  Soft, Non-tender, + BS	  Skin: No rashes, No ecchymoses, No cyanosis	  Neurologic: Non-focal  Extremities: Normal range of motion, No clubbing, cyanosis or edema  Vascular: Peripheral pulses palpable 2+ bilaterally    MEDICATIONS  (STANDING):  amLODIPine   Tablet 5 milliGRAM(s) Oral daily  fluticasone propionate/ salmeterol 100-50 MICROgram(s) Diskus 1 Dose(s) Inhalation two times a day  heparin   Injectable 5000 Unit(s) SubCutaneous every 8 hours  levothyroxine 88 MICROGram(s) Oral daily  lidocaine   4% Patch 3 Patch Transdermal daily  montelukast 10 milliGRAM(s) Oral at bedtime  naloxone Injectable 0.4 milliGRAM(s) IV Push once  piperacillin/tazobactam IVPB.. 3.375 Gram(s) IV Intermittent every 8 hours  polyethylene glycol 3350 17 Gram(s) Oral daily  senna 2 Tablet(s) Oral at bedtime      LABS:	 	      TSH: Thyroid Stimulating Hormone, Serum: 20.50 uU/mL (04-07 @ 05:25)

## 2025-04-14 NOTE — PROGRESS NOTE ADULT - PROBLEM SELECTOR PLAN 11
DVT PPX: Heparin
dvt - heparin SQ.  GI - tolerating PO

## 2025-04-14 NOTE — PROGRESS NOTE ADULT - PROBLEM SELECTOR PLAN 1
CT noted above  MRI left hip - showing Acute nondisplaced fracture of the left femoral head/left   subcapital femoral neck.   Family opting for conservative mgmt  Pain control - add oxycodone PRN   Partial WB to LLE  Ortho following  PT rec HUMA

## 2025-04-14 NOTE — PROGRESS NOTE ADULT - PROBLEM SELECTOR PLAN 10
Not in exacerbation  Takes Advair at home  Continue home regimen  PRN Albuterol
Not in exacerbation  Takes Advair at home  Continue home regimen  PRN Albuterol
hx of COPD.  on Advair at home. Not on LAMA.     - c/w advair qd  - c/w albuterol PRN.  - not currently in exacerbation  - tolerating room air
Not in exacerbation  Takes Advair at home  Continue home regimen  PRN Albuterol

## 2025-04-14 NOTE — PROGRESS NOTE ADULT - PROBLEM SELECTOR PLAN 7
Controlled  Takes Amlodipine, Atenolol and Losartan at home  Continue home regimen Amlodipine  Home dose Losartan held ISO FLAVIO, now resolved  Home done Atenolol held ISO bradycardia, now resolved  Continue home regimen Amlodipine  Consider resuming home dose BB when SBP > 140 x2 consecutive readings  BP target <130/90 mmHg   Monitor BP  DASH diet
Controlled  Takes Amlodipine, Atenolol and Losartan at home  Continue Amlodipine for now  DASH diet
home meds: amlodipine 5, atenolol 25, losartan 100.    - holding losartan iso FLAVIO.  - atenolol on hold due to bradycardia  - c/w amlodipine 5 mg qd
Controlled  Takes Amlodipine, Atenolol and Losartan at home  Continue home regimen Amlodipine  Home dose Losartan held ISO FLAVIO, now resolved  Home dose Atenolol held ISO bradycardia, now resolved  Continue home regimen Amlodipine  Consider resuming home dose BB when SBP > 140 x2 consecutive readings  BP target <130/90 mmHg   Monitor BP  DASH diet
Controlled  Takes Amlodipine, Atenolol and Losartan at home  Continue Amlodipine for now  DASH diet
Controlled  Takes Amlodipine, Atenolol and Losartan at home  Continue home regimen Amlodipine  Home dose Losartan held ISO FLAVIO, now resolved  Home done Atenolol held ISO bradycardia, now resolved  Continue home regimen Amlodipine  Consider resuming home dose BB when SBP > 140 x2 consecutive readings  BP target <130/90 mmHg   Monitor BP  DASH diet

## 2025-04-14 NOTE — PROGRESS NOTE ADULT - ASSESSMENT
Patient is a 89y old  Female who is from home, ambulates with cane, and PMH of HTN, hypothyroidism, COPD, ?dementia. now Presents to the ER for evaluation of A fall at home, was found by HHA on the floor. Unknown down time. In ER, patient AAOx2 (to self, place). States that she had a fall while attempting to stand up, fell and landed on her side. Denies head trauma. On admission, she found to have no fever but Subcapital fracture of the left hip and Bilateral sphenoid sinus disease.  The CT chest shows Bronchiectasis, hence the ID consult requested to assist with further evaluation and rule out ROWDY.    # Bilateral sphenoid sinusitis  # Bronchiectasis, R/O ROWDY - unable to  produce Sputum even with " sputum Induction" by Respiratory team and  Family does not want any invasive procedure such as Bronchoscopy   # Subcapital fracture of the left hip after  A fall - Patient's HCP refusing surgical intervention of  Subcapital fracture of the left hip.    would recommend:    1. Labs in AM please  2. Continue Zosyn to treat sinusitis while inpatient, may change to oral Augmentin 875 mg q 12hours on discharge to continue until 4/19/25  3. Pain Management as needed    d/w covering NP,    Attending Attestation:    Spent more than 35 minutes on total encounter, more than 50 % of the visit was spent counseling and/or coordinating care by the Attending physician.     Patient is a 89y old  Female who is from home, ambulates with cane, and PMH of HTN, hypothyroidism, COPD, ?dementia. now Presents to the ER for evaluation of A fall at home, was found by HHA on the floor. Unknown down time. In ER, patient AAOx2 (to self, place). States that she had a fall while attempting to stand up, fell and landed on her side. Denies head trauma. On admission, she found to have no fever but Subcapital fracture of the left hip and Bilateral sphenoid sinus disease.  The CT chest shows Bronchiectasis, hence the ID consult requested to assist with further evaluation and rule out ROWDY.    # Bilateral sphenoid sinusitis  # Bronchiectasis, R/O ROWDY - unable to  produce Sputum even with " sputum Induction" by Respiratory team and  Family does not want any invasive procedure such as Bronchoscopy   # Subcapital fracture of the left hip after  A fall - Patient's HCP refusing surgical intervention of  Subcapital fracture of the left hip.    would recommend:    1. Labs in AM please  2. Continue Zosyn to treat sinusitis while inpatient, may change to oral Augmentin 875 mg q 12hours on discharge to continue until 4/19/25  3. Pain Management as needed    d/w covering NP, Ray     Attending Attestation:    Spent more than 35 minutes on total encounter, more than 50 % of the visit was spent counseling and/or coordinating care by the Attending physician.

## 2025-04-14 NOTE — PROGRESS NOTE ADULT - SUBJECTIVE AND OBJECTIVE BOX
Patient is seen and examined at the bed side, is afebrile. She is tolerating Zosyn well.      REVIEW OF SYSTEMS: All other review systems are negative      ALLERGIES: No Known Allergies      Vital Signs Last 24 Hrs  T(C): 36.9 (14 Apr 2025 12:35), Max: 36.9 (14 Apr 2025 12:35)  T(F): 98.4 (14 Apr 2025 12:35), Max: 98.4 (14 Apr 2025 12:35)  HR: 100 (14 Apr 2025 12:35) (84 - 100)  BP: 129/86 (14 Apr 2025 12:35) (115/69 - 129/86)  BP(mean): 101 (14 Apr 2025 12:35) (85 - 101)  RR: 18 (14 Apr 2025 12:35) (18 - 18)  SpO2: 94% (14 Apr 2025 12:35) (94% - 96%)    Parameters below as of 14 Apr 2025 12:35  Patient On (Oxygen Delivery Method): room air        PHYSICAL EXAM:  GENERAL: Not in acute distress  CHEST/LUNG:  Air entry bilaterally  HEART: s1 and s2 present  ABDOMEN:  Nontender and  Nondistended  EXTREMITIES: No pedal  edema  CNS: Awake and Alert      LABS: No new Labs                           11.9   7.85  )-----------( 197      ( 10 Apr 2025 05:30 )             37.8                         12.5   11.64 )-----------( 219      ( 09 Apr 2025 06:10 )             39.3         04-10    136  |  100  |  24[H]  ----------------------------<  95  3.1[L]   |  29  |  0.87    Ca    9.5      10 Apr 2025 05:30  Phos  3.0     04-10  Mg     1.7     04-10      04-09    134[L]  |  96  |  20[H]  ----------------------------<  79  3.0[L]   |  30  |  0.90    Ca    9.5      09 Apr 2025 06:10  Phos  3.0     04-09  Mg     1.7     04-09      TPro  6.4  /  Alb  2.2[L]  /  TBili  0.4  /  DBili  x   /  AST  32  /  ALT  35  /  AlkPhos  111  04-07    PT/INR - ( 06 Apr 2025 11:45 )   PT: 10.9 sec;   INR: 0.93 ratio      PTT - ( 06 Apr 2025 11:45 )  PTT:31.6 sec      MEDICATIONS  (STANDING):    amLODIPine   Tablet 5 milliGRAM(s) Oral daily  fluticasone propionate/ salmeterol 100-50 MICROgram(s) Diskus 1 Dose(s) Inhalation two times a day  heparin   Injectable 5000 Unit(s) SubCutaneous every 8 hours  levothyroxine 88 MICROGram(s) Oral daily  lidocaine   4% Patch 3 Patch Transdermal daily  montelukast 10 milliGRAM(s) Oral at bedtime  naloxone Injectable 0.4 milliGRAM(s) IV Push once  piperacillin/tazobactam IVPB.. 3.375 Gram(s) IV Intermittent every 8 hours  polyethylene glycol 3350 17 Gram(s) Oral daily  senna 2 Tablet(s) Oral at bedtime        RADIOLOGY & ADDITIONAL TESTS:    4/6/25: Xray Femur 2 Views, Left (04.06.25 @ 15:42) IMPRESSION: Subcapital fracture of the left hip. 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      4/6/25: CT Cervical Spine No Cont (04.06.25 @ 13:37) >  IMPRESSION:  CT HEAD : No traumatic intracranial abnormality.    Questionable small left frontal encephalocele extending into the left ethmoid sinus and slightly protruding into the left superomedial orbit.   Recommend MRI of the brain seizure protocol without and with contrast for further evaluation.    Bilateral sphenoid sinus disease.    CT CERVICAL SPINE:    No acute fracture or traumatic malalignment.  Multilevel degenerative changes.      MICROBIOLOGY DATA:    Culture - Blood (04.06.25 @ 11:55)   Specimen Source: Blood Blood  Culture Results: No growth at 5 days    Culture - Blood (04.06.25 @ 11:45)   Specimen Source: Blood Blood  Culture Results: No growth at 5 days      Urine Microscopic-Add On (NC) (04.06.25 @ 11:55)   White Blood Cell - Urine: 2 /HPF  Red Blood Cell - Urine: 2 /HPF  Bacteria: Moderate /HPF  Squamous Epithelial Cells: Present

## 2025-04-14 NOTE — PROGRESS NOTE ADULT - PROBLEM SELECTOR PROBLEM 10
Bradycardia
Bradycardia
Chronic obstructive pulmonary disease (COPD)

## 2025-04-14 NOTE — PROGRESS NOTE ADULT - ASSESSMENT
89 y/o F, Mos[place] speaking from home, ambulates with cane, PMH HTN, hypothyroidism, COPD, dementia. Presenting after fall at home ; was found by Niece on the floor.    CT HEAD: Small LEFT frontal encephalocele.   CT C/A/P: impaction fracture of LEFT femoral neck. Bronchiectasis and peribronchial nodularity suspicious for ROWDY infection.   SCr 1.53. Trop 109, proBNP 3k.   Admitted for mechanical fall, bronchiectasis exacerbation, ROWDY workup, and MONO..     Troponin downtrended to 90, Cardiology Dr. Richmond following, echo with EF 51%, mild grade 1 diastolic dysfunction.   Mono resolved.   Ortho following rec MRI left hip - showing Acute nondisplaced fracture of the left femoral head/left   subcapital femoral neck.   Orthopedics and Niece both agree for conservative management.   Neuro Dr. Velez following, MRI brain w/wo IV contrast to further eval incidental finding   encephalocele - shows stability, no signs of infection, no further imaging needed.   ID Dr. Romo consulted for concern of ROWDY infection, unable to collect sputum cultures, continue Zosyn while inpatient and change to Augmentin 875 mg q 12hours on discharge until 4/19/25.   Awaiting PT eval. Dispo planning for [facility].

## 2025-04-14 NOTE — PROGRESS NOTE ADULT - PROBLEM SELECTOR PROBLEM 8
Bradycardia
Frontal encephalocele
Frontal encephalocele
Bradycardia

## 2025-04-14 NOTE — PROGRESS NOTE ADULT - ASSESSMENT
89F, from home, ambulates with cane, PMH HTN, hypothyroidism, COPD, ?dementia. Presenting after fall at home,Lt hip fx,pneumonia.  1.D/C to [facility].  2.MR-Lt hip-+ fx,HCP declining surgery.  3.HTN-norvasc.  4.Hypothyroid-synthroid.  5.COPD-management as per Pulm.  6.Pneumonia-abx completed.  7.GI and DVT prophylaxis.   89F, from home, ambulates with cane, PMH HTN, hypothyroidism, COPD, ?dementia. Presenting after fall at home,Lt hip fx,pneumonia.  1.D/C to [facility].  2.MR-Lt hip-+ fx,HCP declining surgery.  3.HTN-norvasc.  4.Hypothyroid-synthroid.  5.COPD-management as per Pulm.  6.Pneumonia-abx as per ID.  7.GI and DVT prophylaxis.

## 2025-04-14 NOTE — PROGRESS NOTE ADULT - SUBJECTIVE AND OBJECTIVE BOX
Patient is a 89y old  Female who presents with a chief complaint of fall (14 Apr 2025 12:43)    OVERNIGHT EVENTS: no acute changes.     Pt is awake, confused, tolerating PO, bedbound.     REVIEW OF SYSTEMS:   unable to assess due to mental status.     T(C): 36.9 (04-14-25 @ 12:35), Max: 36.9 (04-14-25 @ 12:35)  HR: 100 (04-14-25 @ 12:35) (95 - 100)  BP: 129/86 (04-14-25 @ 12:35) (115/69 - 129/86)  RR: 18 (04-14-25 @ 12:35) (18 - 18)  SpO2: 94% (04-14-25 @ 12:35) (94% - 95%)  Wt(kg): --Vital Signs Last 24 Hrs  T(C): 36.9 (14 Apr 2025 12:35), Max: 36.9 (14 Apr 2025 12:35)  T(F): 98.4 (14 Apr 2025 12:35), Max: 98.4 (14 Apr 2025 12:35)  HR: 100 (14 Apr 2025 12:35) (95 - 100)  BP: 129/86 (14 Apr 2025 12:35) (115/69 - 129/86)  BP(mean): 101 (14 Apr 2025 12:35) (85 - 101)  RR: 18 (14 Apr 2025 12:35) (18 - 18)  SpO2: 94% (14 Apr 2025 12:35) (94% - 95%)    Parameters below as of 14 Apr 2025 12:35  Patient On (Oxygen Delivery Method): room air        MEDICATIONS  (STANDING):  amLODIPine   Tablet 5 milliGRAM(s) Oral daily  fluticasone propionate/ salmeterol 100-50 MICROgram(s) Diskus 1 Dose(s) Inhalation two times a day  heparin   Injectable 5000 Unit(s) SubCutaneous every 8 hours  levothyroxine 88 MICROGram(s) Oral daily  lidocaine   4% Patch 3 Patch Transdermal daily  montelukast 10 milliGRAM(s) Oral at bedtime  naloxone Injectable 0.4 milliGRAM(s) IV Push once  piperacillin/tazobactam IVPB.. 3.375 Gram(s) IV Intermittent every 8 hours  polyethylene glycol 3350 17 Gram(s) Oral daily  senna 2 Tablet(s) Oral at bedtime    MEDICATIONS  (PRN):  albuterol    90 MICROgram(s) HFA Inhaler 2 Puff(s) Inhalation every 6 hours PRN Shortness of Breath and/or Wheezing  bisacodyl 5 milliGRAM(s) Oral daily PRN Constipation  ondansetron Injectable 4 milliGRAM(s) IV Push every 8 hours PRN Nausea and/or Vomiting  oxyCODONE    IR 2.5 milliGRAM(s) Oral every 4 hours PRN Moderate Pain (4 - 6)  oxyCODONE    IR 5 milliGRAM(s) Oral every 4 hours PRN Severe Pain (7 - 10)      PHYSICAL EXAM:  GENERAL: NAD, cachetic   EYES: clear conjunctiva  ENMT: Moist mucous membranes  NECK: Supple, No JVD, Normal thyroid  CHEST/LUNG: Clear to auscultation bilaterally; No rales, rhonchi, wheezing, or rubs  HEART: S1, S2, Regular rate and rhythm  ABDOMEN: Soft, Nontender, Nondistended; Bowel sounds present  NEURO: confused   EXTREMITIES: +left hip tenderness +left shin skin tear. No LE edema, no calf tenderness  LYMPH: No lymphadenopathy noted  SKIN: No rashes or lesions    Consultant(s) Notes Reviewed:  [x ] YES  [ ] NO  Care Discussed with Consultants/Other Providers [ x] YES  [ ] NO    LABS:            CAPILLARY BLOOD GLUCOSE                RADIOLOGY & ADDITIONAL TESTS:  < from: MR Head w/wo IV Cont (04.10.25 @ 15:11) >    ACC: [id number] EXAM:  MR BRAIN WAW IC   ORDERED BY: AN NYE     PROCEDURE DATE:  04/10/2025          INTERPRETATION:  CLINICAL INFORMATION: LEFT frontal encephalocele.. HMR.   Admitting Dxs: S72.009A FRACTURE OF UNSP PART OF NECK OF UNSP FEMUR, INIT.    TECHNIQUE: Multiplanar, multisequence brain MRI was performed following   the administration of 5 ml Gadavist intravenous contrast with 5 ml   discarded.    COMPARISON: CT head 4/6/2025.    FINDINGS:    Motion artifact limits interpretation.    A questionable small left frontal encephalocele extending into the left   ethmoid sinus and slightly protruding into the left superior medial orbit   (15:5).    There is no evidence of acute infarct. There are no foci of   susceptibility artifact tosuggest hemorrhage. Scattered foci of T2/FLAIR   hyperintensity in the bilateral hemispheric white matter are nonspecific   but likely related to chronic white matter microvascular ischemic   disease. The ventricles are normal in size for patient's age.    Flow voids of the major intracranial vessels at the skull base follow   expected course and contour.    Mucosal thickening causing near complete opacification of the left   sphenoid sinus. Trace fluid within the right sphenoid sinus. The mastoids   demonstrate no signal abnormality.  Bilateral orbits are within normal   limits.      IMPRESSION:  Questionable small left frontal encephalocele, as noted on recent CT.   Given the size and proximity of the possible defect to the superior   medial left orbit, would recommend further evaluation with an MRI and CT   of the orbits and thin cuts..    --- End of Report ---          SAMUEL THAYER MD; Resident Radiologist  This document has been electronically signed.  SHEILA MENON MD; Attending Radiologist  This document has been electronically signed. Apr 10 2025  9:06PM    < end of copied text >  < from: MR Hip No Cont, Left (04.10.25 @ 15:06) >    ACC: [id number] EXAM:  MR HIP LT   ORDERED BY: AN NYE     PROCEDURE DATE:  04/10/2025          INTERPRETATION:  EXAMINATION: MRI left hip without contrast    CLINICAL INFORMATION: Left hip pain    TECHNIQUE: Multiplanar, multisequential MR imaging was performed.    FINDINGS: There is a nondisplaced subcapital left femoral neck fracture   with extension into the anterior aspect of the femoral head. There is   adjacent marrow edema. There are no other fractures. There is mild left   hip osteoarthritis. There is a small left hip joint effusion. There are   no acute high-grade or full-thickness tendon or muscle tears. There is   mild edema in the left adductor muscles. There is no muscle atrophy.    IMPRESSION: Acute nondisplaced fracture of the left femoral head/left   subcapital femoral neck.    --- End of Report ---            JENNIFER SHRESTHA MD; Attending Radiologist  This document has been electronically signed. Apr 10 2025  3:18PM    < end of copied text >      Imaging Personally Reviewed:  [ ] YES  [ ] NO

## 2025-04-14 NOTE — PROGRESS NOTE ADULT - SUBJECTIVE AND OBJECTIVE BOX
pt seen in icu [  ], reg med floor [ x  ], bed [ x ], chair at bedside [   ]  Awake, alert, laying in bed in NAD. Remains on RA. Awaiting [facility]    REVIEW OF SYSTEMS:    CONSTITUTIONAL: No weakness, fevers or chills  EYES/ENT: No visual changes;  No vertigo or throat pain   NECK: No pain or stiffness  RESPIRATORY: No cough, wheezing, hemoptysis; No shortness of breath  CARDIOVASCULAR: No chest pain or palpitations  GASTROINTESTINAL: No abdominal or epigastric pain. No nausea, vomiting, or hematemesis; No diarrhea or constipation. No melena or hematochezia.  GENITOURINARY: No dysuria, frequency or hematuria  NEUROLOGICAL: No numbness or weakness  SKIN: No itching, burning, rashes, or lesions   All other review of systems is negative unless indicated above.    Physical Exam    General: WN/WD NAD  Neurology: A&Ox3, nonfocal, PABLO x 4  Respiratory: Rales B/L  CV: RRR, S1S2, no murmurs, rubs or gallops  Abdominal: Soft, NT, ND +BS, Last BM  Extremities: No edema, + peripheral pulses      Allergies  No Known Allergies      Health Issues  Fracture of unspecified part of neck of unspecified femur, initial encounter for closed fracture    DNI    HTN (hypertension)    Hypothyroidism    Chronic obstructive pulmonary disease (COPD)    S/P  section        Vitals  T(F): 97.9 (25 @ 05:10), Max: 97.9 (25 @ 20:49)  HR: 95 (25 @ 05:10) (80 - 98)  BP: 123/67 (25 @ 05:10) (112/75 - 123/67)  RR: 18 (25 @ 05:10) (18 - 18)  SpO2: 94% (25 @ 05:10) (94% - 96%)  Wt(kg): --  CAPILLARY BLOOD GLUCOSE          Labs                      Radiology Results  < from: TTE W or WO Ultrasound Enhancing Agent (04.10.25 @ 15:34) >  CONCLUSIONS:      1. Technically difficult image quality.   2. Left ventricular systolic function is normal with an ejection fraction of 51 % by Garcia's method of disks.   3. There is normal LV mass andconcentric remodeling.   4. There is mild (grade 1) left ventricular diastolic dysfunction.   5. Normal right ventricular cavity size and normal right ventricular systolic function.      < end of copied text >      Meds    MEDICATIONS  (STANDING):  amLODIPine   Tablet 5 milliGRAM(s) Oral daily  fluticasone propionate/ salmeterol 100-50 MICROgram(s) Diskus 1 Dose(s) Inhalation two times a day  heparin   Injectable 5000 Unit(s) SubCutaneous every 8 hours  levothyroxine 88 MICROGram(s) Oral daily  lidocaine   4% Patch 3 Patch Transdermal daily  montelukast 10 milliGRAM(s) Oral at bedtime  naloxone Injectable 0.4 milliGRAM(s) IV Push once  piperacillin/tazobactam IVPB.. 3.375 Gram(s) IV Intermittent every 8 hours  polyethylene glycol 3350 17 Gram(s) Oral daily  senna 2 Tablet(s) Oral at bedtime      MEDICATIONS  (PRN):  albuterol    90 MICROgram(s) HFA Inhaler 2 Puff(s) Inhalation every 6 hours PRN Shortness of Breath and/or Wheezing  bisacodyl 5 milliGRAM(s) Oral daily PRN Constipation  ondansetron Injectable 4 milliGRAM(s) IV Push every 8 hours PRN Nausea and/or Vomiting         pt seen in icu [  ], reg med floor [ x  ], bed [ x ], chair at bedside [   ]  Awake, alert, laying in bed in NAD. Remains on RA. Awaiting [facility]. Refusing to eat    REVIEW OF SYSTEMS:    CONSTITUTIONAL: No weakness, fevers or chills  EYES/ENT: No visual changes;  No vertigo or throat pain   NECK: No pain or stiffness  RESPIRATORY: No cough, wheezing, hemoptysis; No shortness of breath  CARDIOVASCULAR: No chest pain or palpitations  GASTROINTESTINAL: No abdominal or epigastric pain. No nausea, vomiting, or hematemesis; No diarrhea or constipation. No melena or hematochezia.  GENITOURINARY: No dysuria, frequency or hematuria  NEUROLOGICAL: No numbness or weakness  SKIN: No itching, burning, rashes, or lesions   All other review of systems is negative unless indicated above.    Physical Exam    General: WN/WD NAD  Neurology: A&Ox3, nonfocal, PABLO x 4  Respiratory: Rales B/L  CV: RRR, S1S2, no murmurs, rubs or gallops  Abdominal: Soft, NT, ND +BS, Last BM  Extremities: No edema, + peripheral pulses      Allergies  No Known Allergies      Health Issues  Fracture of unspecified part of neck of unspecified femur, initial encounter for closed fracture    DNI    HTN (hypertension)    Hypothyroidism    Chronic obstructive pulmonary disease (COPD)    S/P  section        Vitals  T(F): 97.9 (25 @ 05:10), Max: 97.9 (25 @ 20:49)  HR: 95 (25 @ 05:10) (80 - 98)  BP: 123/67 (25 @ 05:10) (112/75 - 123/67)  RR: 18 (25 @ 05:10) (18 - 18)  SpO2: 94% (25 @ 05:10) (94% - 96%)  Wt(kg): --  CAPILLARY BLOOD GLUCOSE          Labs                      Radiology Results  < from: TTE W or WO Ultrasound Enhancing Agent (04.10.25 @ 15:34) >  CONCLUSIONS:      1. Technically difficult image quality.   2. Left ventricular systolic function is normal with an ejection fraction of 51 % by Garcia's method of disks.   3. There is normal LV mass andconcentric remodeling.   4. There is mild (grade 1) left ventricular diastolic dysfunction.   5. Normal right ventricular cavity size and normal right ventricular systolic function.      < end of copied text >      Meds    MEDICATIONS  (STANDING):  amLODIPine   Tablet 5 milliGRAM(s) Oral daily  fluticasone propionate/ salmeterol 100-50 MICROgram(s) Diskus 1 Dose(s) Inhalation two times a day  heparin   Injectable 5000 Unit(s) SubCutaneous every 8 hours  levothyroxine 88 MICROGram(s) Oral daily  lidocaine   4% Patch 3 Patch Transdermal daily  montelukast 10 milliGRAM(s) Oral at bedtime  naloxone Injectable 0.4 milliGRAM(s) IV Push once  piperacillin/tazobactam IVPB.. 3.375 Gram(s) IV Intermittent every 8 hours  polyethylene glycol 3350 17 Gram(s) Oral daily  senna 2 Tablet(s) Oral at bedtime      MEDICATIONS  (PRN):  albuterol    90 MICROgram(s) HFA Inhaler 2 Puff(s) Inhalation every 6 hours PRN Shortness of Breath and/or Wheezing  bisacodyl 5 milliGRAM(s) Oral daily PRN Constipation  ondansetron Injectable 4 milliGRAM(s) IV Push every 8 hours PRN Nausea and/or Vomiting

## 2025-04-15 ENCOUNTER — TRANSCRIPTION ENCOUNTER (OUTPATIENT)
Age: 89
End: 2025-04-15

## 2025-04-15 VITALS
DIASTOLIC BLOOD PRESSURE: 67 MMHG | HEART RATE: 89 BPM | SYSTOLIC BLOOD PRESSURE: 106 MMHG | RESPIRATION RATE: 18 BRPM | TEMPERATURE: 98 F | OXYGEN SATURATION: 94 %

## 2025-04-15 LAB
ANION GAP SERPL CALC-SCNC: 3 MMOL/L — LOW (ref 5–17)
BUN SERPL-MCNC: 23 MG/DL — HIGH (ref 7–18)
CALCIUM SERPL-MCNC: 10.9 MG/DL — HIGH (ref 8.4–10.5)
CHLORIDE SERPL-SCNC: 105 MMOL/L — SIGNIFICANT CHANGE UP (ref 96–108)
CO2 SERPL-SCNC: 32 MMOL/L — HIGH (ref 22–31)
CREAT SERPL-MCNC: 1.16 MG/DL — SIGNIFICANT CHANGE UP (ref 0.5–1.3)
EGFR: 45 ML/MIN/1.73M2 — LOW
EGFR: 45 ML/MIN/1.73M2 — LOW
GLUCOSE SERPL-MCNC: 90 MG/DL — SIGNIFICANT CHANGE UP (ref 70–99)
HCT VFR BLD CALC: 36 % — SIGNIFICANT CHANGE UP (ref 34.5–45)
HGB BLD-MCNC: 11.2 G/DL — LOW (ref 11.5–15.5)
MAGNESIUM SERPL-MCNC: 2.1 MG/DL — SIGNIFICANT CHANGE UP (ref 1.6–2.6)
MCHC RBC-ENTMCNC: 26.5 PG — LOW (ref 27–34)
MCHC RBC-ENTMCNC: 31.1 G/DL — LOW (ref 32–36)
MCV RBC AUTO: 85.1 FL — SIGNIFICANT CHANGE UP (ref 80–100)
NRBC BLD AUTO-RTO: 0 /100 WBCS — SIGNIFICANT CHANGE UP (ref 0–0)
PHOSPHATE SERPL-MCNC: 2.7 MG/DL — SIGNIFICANT CHANGE UP (ref 2.5–4.5)
PLATELET # BLD AUTO: 243 K/UL — SIGNIFICANT CHANGE UP (ref 150–400)
POTASSIUM SERPL-MCNC: 3.3 MMOL/L — LOW (ref 3.5–5.3)
POTASSIUM SERPL-SCNC: 3.3 MMOL/L — LOW (ref 3.5–5.3)
RBC # BLD: 4.23 M/UL — SIGNIFICANT CHANGE UP (ref 3.8–5.2)
RBC # FLD: 14.9 % — HIGH (ref 10.3–14.5)
SODIUM SERPL-SCNC: 140 MMOL/L — SIGNIFICANT CHANGE UP (ref 135–145)
WBC # BLD: 8.38 K/UL — SIGNIFICANT CHANGE UP (ref 3.8–10.5)
WBC # FLD AUTO: 8.38 K/UL — SIGNIFICANT CHANGE UP (ref 3.8–10.5)

## 2025-04-15 PROCEDURE — 99285 EMERGENCY DEPT VISIT HI MDM: CPT

## 2025-04-15 PROCEDURE — 71045 X-RAY EXAM CHEST 1 VIEW: CPT

## 2025-04-15 PROCEDURE — 82306 VITAMIN D 25 HYDROXY: CPT

## 2025-04-15 PROCEDURE — 93005 ELECTROCARDIOGRAM TRACING: CPT

## 2025-04-15 PROCEDURE — 82550 ASSAY OF CK (CPK): CPT

## 2025-04-15 PROCEDURE — 85610 PROTHROMBIN TIME: CPT

## 2025-04-15 PROCEDURE — 74176 CT ABD & PELVIS W/O CONTRAST: CPT | Mod: MC

## 2025-04-15 PROCEDURE — 97110 THERAPEUTIC EXERCISES: CPT

## 2025-04-15 PROCEDURE — 72125 CT NECK SPINE W/O DYE: CPT | Mod: MC

## 2025-04-15 PROCEDURE — 84481 FREE ASSAY (FT-3): CPT

## 2025-04-15 PROCEDURE — 96360 HYDRATION IV INFUSION INIT: CPT

## 2025-04-15 PROCEDURE — 71250 CT THORAX DX C-: CPT | Mod: MC

## 2025-04-15 PROCEDURE — 82962 GLUCOSE BLOOD TEST: CPT

## 2025-04-15 PROCEDURE — 83605 ASSAY OF LACTIC ACID: CPT

## 2025-04-15 PROCEDURE — 84484 ASSAY OF TROPONIN QUANT: CPT

## 2025-04-15 PROCEDURE — 36415 COLL VENOUS BLD VENIPUNCTURE: CPT

## 2025-04-15 PROCEDURE — 80053 COMPREHEN METABOLIC PANEL: CPT

## 2025-04-15 PROCEDURE — 84100 ASSAY OF PHOSPHORUS: CPT

## 2025-04-15 PROCEDURE — 82553 CREATINE MB FRACTION: CPT

## 2025-04-15 PROCEDURE — 85730 THROMBOPLASTIN TIME PARTIAL: CPT

## 2025-04-15 PROCEDURE — 84443 ASSAY THYROID STIM HORMONE: CPT

## 2025-04-15 PROCEDURE — 87040 BLOOD CULTURE FOR BACTERIA: CPT

## 2025-04-15 PROCEDURE — 70450 CT HEAD/BRAIN W/O DYE: CPT | Mod: MC

## 2025-04-15 PROCEDURE — 94640 AIRWAY INHALATION TREATMENT: CPT

## 2025-04-15 PROCEDURE — A9585: CPT

## 2025-04-15 PROCEDURE — 97116 GAIT TRAINING THERAPY: CPT

## 2025-04-15 PROCEDURE — 73721 MRI JNT OF LWR EXTRE W/O DYE: CPT

## 2025-04-15 PROCEDURE — 81001 URINALYSIS AUTO W/SCOPE: CPT

## 2025-04-15 PROCEDURE — 83735 ASSAY OF MAGNESIUM: CPT

## 2025-04-15 PROCEDURE — 85027 COMPLETE CBC AUTOMATED: CPT

## 2025-04-15 PROCEDURE — 85025 COMPLETE CBC W/AUTO DIFF WBC: CPT

## 2025-04-15 PROCEDURE — 84439 ASSAY OF FREE THYROXINE: CPT

## 2025-04-15 PROCEDURE — 97162 PT EVAL MOD COMPLEX 30 MIN: CPT

## 2025-04-15 PROCEDURE — 73522 X-RAY EXAM HIPS BI 3-4 VIEWS: CPT

## 2025-04-15 PROCEDURE — 70553 MRI BRAIN STEM W/O & W/DYE: CPT | Mod: MC

## 2025-04-15 PROCEDURE — 93306 TTE W/DOPPLER COMPLETE: CPT

## 2025-04-15 PROCEDURE — 97530 THERAPEUTIC ACTIVITIES: CPT

## 2025-04-15 PROCEDURE — 87637 SARSCOV2&INF A&B&RSV AMP PRB: CPT

## 2025-04-15 PROCEDURE — 73552 X-RAY EXAM OF FEMUR 2/>: CPT

## 2025-04-15 PROCEDURE — 80048 BASIC METABOLIC PNL TOTAL CA: CPT

## 2025-04-15 PROCEDURE — 83880 ASSAY OF NATRIURETIC PEPTIDE: CPT

## 2025-04-15 RX ORDER — ACETAMINOPHEN 500 MG/5ML
1000 LIQUID (ML) ORAL EVERY 8 HOURS
Refills: 0 | Status: DISCONTINUED | OUTPATIENT
Start: 2025-04-15 | End: 2025-04-15

## 2025-04-15 RX ORDER — SENNA 187 MG
2 TABLET ORAL
Qty: 0 | Refills: 0 | DISCHARGE
Start: 2025-04-15

## 2025-04-15 RX ORDER — LEVOTHYROXINE SODIUM 300 MCG
1 TABLET ORAL
Qty: 0 | Refills: 0 | DISCHARGE
Start: 2025-04-15

## 2025-04-15 RX ORDER — OXYCODONE HYDROCHLORIDE 30 MG/1
1 TABLET ORAL
Qty: 0 | Refills: 0 | DISCHARGE
Start: 2025-04-15

## 2025-04-15 RX ORDER — LOSARTAN POTASSIUM 100 MG/1
1 TABLET, FILM COATED ORAL
Refills: 0 | DISCHARGE

## 2025-04-15 RX ORDER — ACETAMINOPHEN 500 MG/5ML
2 LIQUID (ML) ORAL
Qty: 0 | Refills: 0 | DISCHARGE
Start: 2025-04-15

## 2025-04-15 RX ORDER — LEVOTHYROXINE SODIUM 300 MCG
1 TABLET ORAL
Refills: 0 | DISCHARGE

## 2025-04-15 RX ORDER — MONTELUKAST SODIUM 10 MG/1
1 TABLET ORAL
Qty: 0 | Refills: 0 | DISCHARGE
Start: 2025-04-15

## 2025-04-15 RX ORDER — AMOXICILLIN AND CLAVULANATE POTASSIUM 500; 125 MG/1; MG/1
1 TABLET, FILM COATED ORAL
Qty: 0 | Refills: 0 | DISCHARGE

## 2025-04-15 RX ORDER — POLYETHYLENE GLYCOL 3350 17 G/17G
17 POWDER, FOR SOLUTION ORAL
Qty: 0 | Refills: 0 | DISCHARGE
Start: 2025-04-15

## 2025-04-15 RX ORDER — LISINOPRIL 30 MG/1
1 TABLET ORAL
Refills: 0 | DISCHARGE

## 2025-04-15 RX ORDER — ALBUTEROL SULFATE 2.5 MG/3ML
2 VIAL, NEBULIZER (ML) INHALATION
Qty: 0 | Refills: 0 | DISCHARGE
Start: 2025-04-15

## 2025-04-15 RX ORDER — LIDOCAINE HYDROCHLORIDE 20 MG/ML
1 JELLY TOPICAL
Qty: 0 | Refills: 0 | DISCHARGE
Start: 2025-04-15

## 2025-04-15 RX ORDER — BISACODYL 5 MG
1 TABLET, DELAYED RELEASE (ENTERIC COATED) ORAL
Qty: 0 | Refills: 0 | DISCHARGE
Start: 2025-04-15

## 2025-04-15 RX ADMIN — HEPARIN SODIUM 5000 UNIT(S): 1000 INJECTION INTRAVENOUS; SUBCUTANEOUS at 05:26

## 2025-04-15 RX ADMIN — Medication 1 DOSE(S): at 09:44

## 2025-04-15 RX ADMIN — Medication 25 GRAM(S): at 08:14

## 2025-04-15 RX ADMIN — Medication 88 MICROGRAM(S): at 05:26

## 2025-04-15 RX ADMIN — LIDOCAINE HYDROCHLORIDE 3 PATCH: 20 JELLY TOPICAL at 00:25

## 2025-04-15 RX ADMIN — LIDOCAINE HYDROCHLORIDE 3 PATCH: 20 JELLY TOPICAL at 14:05

## 2025-04-15 RX ADMIN — Medication 40 MILLIEQUIVALENT(S): at 12:30

## 2025-04-15 RX ADMIN — AMLODIPINE BESYLATE 5 MILLIGRAM(S): 10 TABLET ORAL at 05:26

## 2025-04-15 RX ADMIN — POLYETHYLENE GLYCOL 3350 17 GRAM(S): 17 POWDER, FOR SOLUTION ORAL at 14:04

## 2025-04-15 RX ADMIN — Medication 1000 MILLIGRAM(S): at 14:00

## 2025-04-15 RX ADMIN — Medication 1000 MILLIGRAM(S): at 15:00

## 2025-04-15 NOTE — PROGRESS NOTE ADULT - REASON FOR ADMISSION
fall

## 2025-04-15 NOTE — PROGRESS NOTE ADULT - PROBLEM SELECTOR PLAN 6
Cont meds   Supportive care  nutritional support  Consider megace 5cc po tid
Resolved  SCr 0.9 (Unknown baseline)  S/p IVF  Encourage PO intake  Trend BMP
SCr 1.53  unclear baseline.    - proBNP 3486, however does not appear overloaded.  Looks dry.  - likely FLAVIO iso ACEi/ARB use iso poor PO intake.  - s/p IV fluids  - SCr improved to 0.97
Resolved  SCr 0.9 (Unknown baseline)  S/p IVF  Encourage PO intake  Trend BMP

## 2025-04-15 NOTE — PROGRESS NOTE ADULT - ASSESSMENT
89F, from home, ambulates with cane, PMH HTN, hypothyroidism, COPD, ?dementia. Presenting after fall at home,Lt hip fx,pneumonia.  1.D/C to [facility].  2.MR-Lt hip-+ fx,HCP declining surgery.  3.HTN-norvasc.  4.Hypothyroid-synthroid.  5.COPD-management as per Pulm.  6.Pneumonia-abx as per ID.  7.Replace k+.  8.GI and DVT prophylaxis.

## 2025-04-15 NOTE — DISCHARGE NOTE NURSING/CASE MANAGEMENT/SOCIAL WORK - PATIENT PORTAL LINK FT
You can access the FollowMyHealth Patient Portal offered by [facility] by registering at the following website: [email or web address]. By joining [employer]’s FollowMyHealth portal, you will also be able to view your health information using other applications (apps) compatible with our system.

## 2025-04-15 NOTE — PROGRESS NOTE ADULT - PROBLEM SELECTOR PLAN 5
Cardio follow u  Echo
Cardio follow up  Echo noted
Cardio follow u  Echo
Cardio follow up  Echo noted
K 3.1  Replete   Trend BMP, Mag & Phos
K 3.2  phos 1.6  - likely in setting of poor po intake  - add potassium 40 meq x1 dose  - add potassium phos x3 doses  - f/u BMP, phos
Phos 2.1  Replete   Trend BMP, Mag & Phos
K 2.1  Replete   Trend BMP, Mag & Phos
resolved
resolved

## 2025-04-15 NOTE — PROGRESS NOTE ADULT - SUBJECTIVE AND OBJECTIVE BOX
Patient is a 89y old  Female who presents with a chief complaint of fall (14 Apr 2025 17:05)      pt seen in icu [  ], reg med floor [ x  ], bed [x  ], chair at bedside [   ], a+o x3 [x  ], lethargic [  ],  nad [ x ]      Allergies    No Known Allergies        Vitals    T(F): 98.5 (04-15-25 @ 05:09), Max: 98.5 (04-15-25 @ 05:09)  HR: 76 (04-15-25 @ 05:09) (76 - 100)  BP: 132/79 (04-15-25 @ 05:09) (125/63 - 132/79)  RR: 17 (04-15-25 @ 05:09) (17 - 18)  SpO2: 92% (04-15-25 @ 05:09) (92% - 95%)  Wt(kg): --  CAPILLARY BLOOD GLUCOSE          Labs                          11.2   8.38  )-----------( 243      ( 15 Apr 2025 05:15 )             36.0       04-15    140  |  105  |  23[H]  ----------------------------<  90  3.3[L]   |  32[H]  |  1.16    Ca    10.9[H]      15 Apr 2025 05:15  Phos  2.7     04-15  Mg     2.1     04-15              Blood Blood  04-06 @ 11:55   No growth at 5 days  --  --      Blood Blood  04-06 @ 11:45   No growth at 5 days  --  --          Radiology Results      Meds    MEDICATIONS  (STANDING):  acetaminophen     Tablet .. 1000 milliGRAM(s) Oral every 8 hours  amLODIPine   Tablet 5 milliGRAM(s) Oral daily  fluticasone propionate/ salmeterol 100-50 MICROgram(s) Diskus 1 Dose(s) Inhalation two times a day  heparin   Injectable 5000 Unit(s) SubCutaneous every 12 hours  levothyroxine 88 MICROGram(s) Oral daily  lidocaine   4% Patch 3 Patch Transdermal daily  montelukast 10 milliGRAM(s) Oral at bedtime  naloxone Injectable 0.4 milliGRAM(s) IV Push once  piperacillin/tazobactam IVPB.. 3.375 Gram(s) IV Intermittent every 8 hours  polyethylene glycol 3350 17 Gram(s) Oral daily  potassium chloride    Tablet ER 40 milliEquivalent(s) Oral once  senna 2 Tablet(s) Oral at bedtime      MEDICATIONS  (PRN):  albuterol    90 MICROgram(s) HFA Inhaler 2 Puff(s) Inhalation every 6 hours PRN Shortness of Breath and/or Wheezing  bisacodyl 5 milliGRAM(s) Oral daily PRN Constipation  ondansetron Injectable 4 milliGRAM(s) IV Push every 8 hours PRN Nausea and/or Vomiting  oxyCODONE    IR 5 milliGRAM(s) Oral every 4 hours PRN Severe Pain (7 - 10)      Physical Exam    Neuro :  no focal deficits  Respiratory: CTA B/L  CV: RRR, S1S2, no murmurs,   Abdominal: Soft, NT, ND +BS,  Extremities: No edema, + peripheral pulses    ASSESSMENT    left femoral neck fx,   bronchiectasis with karen,   licha,    demand ischemia   left frontal encephalocele,   sinusitis   h/o HTN,   hypothyroidism,   COPD,   ?dementia       PLAN    cont pain control,   pain mgmt f/u   Opioid pain recommendations   - Continue Oxycodone 2.5/5 mg PO q4h PRN moderate/severe pain. Monitor for sedation/ respiratory depression.   Non-opioid pain recommendations   - completed Acetaminophen 1 gram PO q 8 hours x 4 days. Monitor LFT's  - Continue Lidocaine 4% patches x 3 daily.  Bowel Regimen  - Continue Miralax 17G PO daily  - Continue Senna 2 tablets at bedtime for constipation  Mild pain   - Non-pharmacological pain treatment recommendations  - Warm/ Cool packs PRN   - Repositioning extremity, guided imagery, relaxation, distraction.  - Physical therapy OOB if no contraindications   mri left hip with Acute nondisplaced fracture of the left femoral head/left   subcapital femoral neck noted    ortho f/u      -  MRI results for L Hip reviewed    -  HCP patients niece Nataly informed of risks and benefits of surgical vs conservative management, all questions and concerns answered. HCP refusing surgical intervention.   -  Recommendation: Conservative management , no surgical orthopedic intervention is recommended at this time.  -  DVT prophylaxis with heparin and Venodynes  -  Daily PT: PWB to LLE at this time, OOB with assistive device  phys tx eval noted and rec phys tx daily 4-6 weeks in [facility]  id f/u   Continue Zosyn to treat sinusitis while inpatient,   may change to oral Augmentin 875 mg q 12hours on discharge to continue until 4/19/25  pulm f/u    Bronchodilators  Budesonide 0.25 mgs one unit  dose neb BID  Montelukast 10 mgs po Qhs.  serum creat wnl   supplement potassium    phos wnl  trop trending down   cardio f/u   cont norvasc   echo  ejection fraction of 51 % mild (grade 1) left ventricular diastolic dysfunction noted above.  mri with Questionable small left frontal encephalocele, as noted on recent CT.   Given the size and proximity of the possible defect to the superior   medial left orbit, would recommend further evaluation with an MRI and CT   of the orbits and thin cuts noted     neuro f/u   incidental small left frontal encephalocele, without evidence of acute intracranial abnormality,    - No role for neurosurgical intervention for incidental finding of small left frontal encephalocele  tsh elevated 20.5  ft4 1.1   endo f/u  cont lt4 88 mcg  tfts in 4-6 wks  cont current meds   d/c planning to [place] or [place] for STR pending aceptance,

## 2025-04-15 NOTE — PROGRESS NOTE ADULT - SUBJECTIVE AND OBJECTIVE BOX
Patient is a 89y old  Female who presents with a chief complaint of fall (15 Apr 2025 10:54)  Awake, alert, lying in bed in NAD. Ate a bit today    INTERVAL HPI/OVERNIGHT EVENTS:      VITAL SIGNS:  T(F): 98.5 (04-15-25 @ 05:09)  HR: 76 (04-15-25 @ 05:09)  BP: 132/79 (04-15-25 @ 05:09)  RR: 17 (04-15-25 @ 05:09)  SpO2: 92% (04-15-25 @ 05:09)  Wt(kg): --  I&O's Detail          REVIEW OF SYSTEMS:    CONSTITUTIONAL:  No fevers, chills, sweats    HEENT:  Eyes:  No diplopia or blurred vision. ENT:  No earache, sore throat or runny nose.    CARDIOVASCULAR:  No pressure, squeezing, tightness, or heaviness about the chest; no palpitations.    RESPIRATORY:  Per HPI    GASTROINTESTINAL:  No abdominal pain, nausea, vomiting or diarrhea.    GENITOURINARY:  No dysuria, frequency or urgency.    NEUROLOGIC:  No paresthesias, fasciculations, seizures or weakness.    PSYCHIATRIC:  No disorder of thought or mood.      PHYSICAL EXAM:    Constitutional: Well developed and nourished  Eyes:Perrla  ENMT: normal  Neck:supple  Respiratory: good air entry  Cardiovascular: S1 S2 regular  Gastrointestinal: Soft, Non tender  Extremities: No edema  Vascular:normal  Neurological:Awake, alert,Ox3  Musculoskeletal:Normal      MEDICATIONS  (STANDING):  acetaminophen     Tablet .. 1000 milliGRAM(s) Oral every 8 hours  amLODIPine   Tablet 5 milliGRAM(s) Oral daily  fluticasone propionate/ salmeterol 100-50 MICROgram(s) Diskus 1 Dose(s) Inhalation two times a day  heparin   Injectable 5000 Unit(s) SubCutaneous every 12 hours  levothyroxine 88 MICROGram(s) Oral daily  lidocaine   4% Patch 3 Patch Transdermal daily  montelukast 10 milliGRAM(s) Oral at bedtime  naloxone Injectable 0.4 milliGRAM(s) IV Push once  piperacillin/tazobactam IVPB.. 3.375 Gram(s) IV Intermittent every 8 hours  polyethylene glycol 3350 17 Gram(s) Oral daily  potassium chloride    Tablet ER 40 milliEquivalent(s) Oral once  senna 2 Tablet(s) Oral at bedtime    MEDICATIONS  (PRN):  albuterol    90 MICROgram(s) HFA Inhaler 2 Puff(s) Inhalation every 6 hours PRN Shortness of Breath and/or Wheezing  bisacodyl 5 milliGRAM(s) Oral daily PRN Constipation  ondansetron Injectable 4 milliGRAM(s) IV Push every 8 hours PRN Nausea and/or Vomiting  oxyCODONE    IR 5 milliGRAM(s) Oral every 4 hours PRN Severe Pain (7 - 10)      Allergies    No Known Allergies    Intolerances        LABS:                        11.2   8.38  )-----------( 243      ( 15 Apr 2025 05:15 )             36.0     04-15    140  |  105  |  23[H]  ----------------------------<  90  3.3[L]   |  32[H]  |  1.16    Ca    10.9[H]      15 Apr 2025 05:15  Phos  2.7     04-15  Mg     2.1     04-15        Urinalysis Basic - ( 15 Apr 2025 05:15 )    Color: x / Appearance: x / SG: x / pH: x  Gluc: 90 mg/dL / Ketone: x  / Bili: x / Urobili: x   Blood: x / Protein: x / Nitrite: x   Leuk Esterase: x / RBC: x / WBC x   Sq Epi: x / Non Sq Epi: x / Bacteria: x            CAPILLARY BLOOD GLUCOSE            RADIOLOGY & ADDITIONAL TESTS:    CXR:    Ct scan chest:    ekg;    echo:

## 2025-04-15 NOTE — PROGRESS NOTE ADULT - SUBJECTIVE AND OBJECTIVE BOX
Date of Service 04-15-25 @ 10:55    CHIEF COMPLAINT:Patient is a 89y old  Female who presents with a chief complaint of fall .Pt appears comfortable.    	  REVIEW OF SYSTEMS:  CONSTITUTIONAL: No fever, weight loss, or fatigue  EYES: No eye pain, visual disturbances, or discharge  ENT:  No difficulty hearing, tinnitus, vertigo; No sinus or throat pain  NECK: No pain or stiffness  RESPIRATORY: No cough, wheezing, chills or hemoptysis; No Shortness of Breath  CARDIOVASCULAR: No chest pain, palpitations, passing out, dizziness, or leg swelling  GASTROINTESTINAL: No abdominal or epigastric pain. No nausea, vomiting, or hematemesis; No diarrhea or constipation. No melena or hematochezia.  GENITOURINARY: No dysuria, frequency, hematuria, or incontinence  NEUROLOGICAL: No headaches, memory loss, loss of strength, numbness, or tremors  SKIN: No itching, burning, rashes, or lesions   LYMPH Nodes: No enlarged glands  ENDOCRINE: No heat or cold intolerance; No hair loss  MUSCULOSKELETAL: No joint pain or swelling; No muscle, back, or extremity pain  PSYCHIATRIC: No depression, anxiety, mood swings, or difficulty sleeping  HEME/LYMPH: No easy bruising, or bleeding gums  ALLERGY AND IMMUNOLOGIC: No hives or eczema	        PHYSICAL EXAM:  T(C): 36.9 (04-15-25 @ 05:09), Max: 36.9 (04-14-25 @ 12:35)  HR: 76 (04-15-25 @ 05:09) (76 - 100)  BP: 132/79 (04-15-25 @ 05:09) (125/63 - 132/79)  RR: 17 (04-15-25 @ 05:09) (17 - 18)  SpO2: 92% (04-15-25 @ 05:09) (92% - 95%)  Wt(kg): --  I&O's Summary      Appearance: Normal	  HEENT:   Normal oral mucosa, PERRL, EOMI	  Lymphatic: No lymphadenopathy  Cardiovascular: Normal S1 S2, No JVD, No murmurs, No edema  Respiratory: Lungs clear to auscultation	  Psychiatry: A & O x 3, Mood & affect appropriate  Gastrointestinal:  Soft, Non-tender, + BS	  Skin: No rashes, No ecchymoses, No cyanosis	  Neurologic: Non-focal  Extremities: Normal range of motion, No clubbing, cyanosis or edema  Vascular: Peripheral pulses palpable 2+ bilaterally    MEDICATIONS  (STANDING):  acetaminophen     Tablet .. 1000 milliGRAM(s) Oral every 8 hours  amLODIPine   Tablet 5 milliGRAM(s) Oral daily  fluticasone propionate/ salmeterol 100-50 MICROgram(s) Diskus 1 Dose(s) Inhalation two times a day  heparin   Injectable 5000 Unit(s) SubCutaneous every 12 hours  levothyroxine 88 MICROGram(s) Oral daily  lidocaine   4% Patch 3 Patch Transdermal daily  montelukast 10 milliGRAM(s) Oral at bedtime  naloxone Injectable 0.4 milliGRAM(s) IV Push once  piperacillin/tazobactam IVPB.. 3.375 Gram(s) IV Intermittent every 8 hours  polyethylene glycol 3350 17 Gram(s) Oral daily  potassium chloride    Tablet ER 40 milliEquivalent(s) Oral once  senna 2 Tablet(s) Oral at bedtime        LABS:	 	                         11.2   8.38  )-----------( 243      ( 15 Apr 2025 05:15 )             36.0     04-15    140  |  105  |  23[H]  ----------------------------<  90  3.3[L]   |  32[H]  |  1.16    Ca    10.9[H]      15 Apr 2025 05:15  Phos  2.7     04-15  Mg     2.1     04-15        TSH: Thyroid Stimulating Hormone, Serum: 20.50 uU/mL (04-07 @ 05:25)

## 2025-04-15 NOTE — DISCHARGE NOTE NURSING/CASE MANAGEMENT/SOCIAL WORK - FINANCIAL ASSISTANCE
[facility] provides services at a reduced cost to those who are determined to be eligible through [facility]’s financial assistance program. Information regarding [facility]’s financial assistance program can be found by going to [email or web address] or by calling [phone].

## 2025-04-15 NOTE — PROGRESS NOTE ADULT - PROBLEM SELECTOR PLAN 1
pain control  Ortho follow up  Family wants just conservative Treatment  MRI of left hip noted  DVT PPX  PT/Rehab

## 2025-04-15 NOTE — PROGRESS NOTE ADULT - PROBLEM SELECTOR PROBLEM 2
Bronchiectasis with acute exacerbation
Chronic obstructive pulmonary disease (COPD)
Chronic obstructive pulmonary disease (COPD)
Fracture of femoral neck, left
Chronic obstructive pulmonary disease (COPD)
Bronchiectasis with acute exacerbation
Bronchiectasis with acute exacerbation
Chronic obstructive pulmonary disease (COPD)
Bronchiectasis with acute exacerbation
Chronic obstructive pulmonary disease (COPD)
Bronchiectasis with acute exacerbation
Bronchiectasis with acute exacerbation

## 2025-04-15 NOTE — PROGRESS NOTE ADULT - PROBLEM SELECTOR PLAN 3
CT HEAD: Small LEFT frontal encephalocele.     - MRI brain w/wo IV contrast.  - Neurology Dr Velez consulted
monitor BP  cont meds
CT Head noted above  F/u MRI Brain  Dr Velez, Neurology, following  Recommendations appreciated
CT Head noted above  F/u MRI Brain  Dr Velez, Neurology, following  Recommendations appreciated
monitor BP  cont meds
monitor BP  cont meds
CT Head noted above  MRI Brain - small left frontal encephalocele, defect to the superior medial left orbit  Dr Velez, Neurology, rec encephalocele is stable, no infection, no further imaging is needed
CT Head noted above  F/u MRI Brain  Dr Velez, Neurology, following  Recommendations appreciated
CT Head noted above  MRI Brain - small left frontal encephalocele, defect to the superior medial left orbit  Dr Velez, Neurology, rec encephalocele is stable, no infection, no further imaging is needed

## 2025-04-15 NOTE — PROGRESS NOTE ADULT - PROBLEM SELECTOR PROBLEM 3
HTN (hypertension)
HTN (hypertension)
Bronchiectasis with acute exacerbation
HTN (hypertension)
Frontal encephalocele
HTN (hypertension)
HTN (hypertension)
Frontal encephalocele
Frontal encephalocele
Bronchiectasis with acute exacerbation
HTN (hypertension)
Frontal encephalocele
Frontal encephalocele

## 2025-04-15 NOTE — DISCHARGE NOTE NURSING/CASE MANAGEMENT/SOCIAL WORK - NSDCPEFALRISK_GEN_ALL_CORE
For information on Fall & Injury Prevention, visit: [email or web address] OR  [email or web address] OR  https://www.cdc.gov/steadi/patient.html

## 2025-04-15 NOTE — PROGRESS NOTE ADULT - PROBLEM SELECTOR PROBLEM 6
Chronic obstructive pulmonary disease (COPD)
FLAVIO (acute kidney injury)
Dementia
Chronic obstructive pulmonary disease (COPD)
FLAVIO (acute kidney injury)
Dementia
FLAVIO (acute kidney injury)

## 2025-04-15 NOTE — PROGRESS NOTE ADULT - PROBLEM SELECTOR PROBLEM 4
Hypothyroidism
HTN (hypertension)
Hypothyroidism
Demand ischemia
HTN (hypertension)
Hypothyroidism
Demand ischemia
Hypothyroidism
Demand ischemia
Demand ischemia

## 2025-04-15 NOTE — PROGRESS NOTE ADULT - PROBLEM SELECTOR PROBLEM 1
Fracture of femoral neck, left
Fracture of femoral neck, left
Left hip pain
Fracture of femoral neck, left
Left hip pain
Fracture of femoral neck, left

## 2025-04-15 NOTE — PROGRESS NOTE ADULT - PROBLEM SELECTOR PLAN 2
Problem: Falls - Risk of:  Goal: Will remain free from falls  Will remain free from falls   Outcome: Ongoing    Goal: Absence of physical injury  Absence of physical injury   Outcome: Ongoing      Problem: Self-Care:  Goal: Ability to participate in self-care as condition permits will improve  Ability to participate in self-care as condition permits will improve   Outcome: Ongoing      Problem: Mental Status - Impaired:  Goal: Mental status will improve  Mental status will improve   Outcome: Ongoing      Problem: Risk for Impaired Skin Integrity  Goal: Tissue integrity - skin and mucous membranes  Structural intactness and normal physiological function of skin and  mucous membranes.    Outcome: Ongoing      Problem: COMMUNICATION IMPAIRMENT  Goal: Ability to express needs and understand communication  Outcome: Ongoing      Problem: Serum Glucose Level - Abnormal:  Goal: Ability to maintain appropriate glucose levels will improve  Ability to maintain appropriate glucose levels will improve   Outcome: Ongoing
Problem: Falls - Risk of:  Goal: Will remain free from falls  Will remain free from falls   Outcome: Ongoing    Goal: Absence of physical injury  Absence of physical injury   Outcome: Ongoing      Problem: Self-Care:  Goal: Ability to participate in self-care as condition permits will improve  Ability to participate in self-care as condition permits will improve  Outcome: Ongoing      Problem: Mental Status - Impaired:  Goal: Mental status will improve  Mental status will improve  Outcome: Ongoing
CTC noted above  failed Sputum induction with 3% NS nebulizer  s/p azithromycin and ceftriaxone  c/w Zosyn while inpatient  change to  Augmentin 875 mg q 12hours on discharge until 4/19/25  LUCY Goodman, following
Bronchodilators  montelukast 10 mgs po Qhs  oxygen supp prn  monitor oxygen sat  Replace mina
Bronchodilators  montelukast 10 mgs po Qhs  oxygen supp prn  monitor oxygen sat  Replace mina
CTC noted above  failed Sputum induction with 3% NS nebulizer  s/p azithromycin and ceftriaxone  c/w Zosyn while inpatient  change to  Augmentin 875 mg q 12hours on discharge until 4/19/25  LUCY Goodman, following
CTC noted above  Sputum induction with 3% NS nebulizer  F/u acid fast sputum w/culture x2.  Continue Ceftriaxone & Azithro (day 3)  Dr Romo, ID, following  Recommendations appreciated
Bronchodilators  montelukast 10 mgs po Qhs  oxygen supp prn  monitor oxygen sat
Bronchodilators  montelukast 10 mgs po Qhs  oxygen supp prn  monitor oxygen sat  Replace mina
CTC noted above  Sputum induction with 3% NS nebulizer  F/u acid fast sputum w/culture x2.  Continue Ceftriaxone (day 4)  S/p Bernie Romo, ID, following  Recommendations appreciated
Bronchodilators  montelukast 10 mgs po Qhs  oxygen supp prn  monitor oxygen sat  Replace mina
CT chest - showing Bronchiectasis and peribronchial nodularity suspicious for ROWDY infection.  endorses mildly productive cough.  hx of COPD.    - Will need to r/o ROWDY.   - sputum induction with 3% NS nebulizer  - f/u acid fast sputum w/culture x2.  - c/w taisha PONCE.   - ID Dr Romo consulted.
CTC noted above  failed Sputum induction with 3% NS nebulizer  s/p azithromycin and ceftriaxone  c/w Zosyn  Dr Romo, ID, following  Recommendations appreciated

## 2025-04-15 NOTE — PROGRESS NOTE ADULT - PROBLEM SELECTOR PLAN 4
TFTs  Cont meds
TFTs  Cont meds
Peaked at 109   Likely demand  Asymptomatic  EKG sinus jose e. No ischemic changes.  echo normal with normal EF 51%, mild grade 1 diastolic dysfunction.   Dr. Richmond, Cardiology, following
trop 109 > 97> 90  EKG sinus jose e. No ischemic changes.  denies chest pain.    - likely demand ischemia.  - f/u echo  - Cardio Dr. Richmond following
Peaked at 109   Likely demand  Asymptomatic  EKG sinus jose e. No ischemic changes.  F/u echo  Dr. Richmond, Cardiology, following  Recommendations appreciated
TFTs  Cont meds
Peaked at 109   Likely demand  Asymptomatic  EKG sinus jose e. No ischemic changes.  echo normal with normal EF 51%, mild grade 1 diastolic dysfunction.   Dr. Richmond, Cardiology, following
Peaked at 109   Likely demand  Asymptomatic  EKG sinus jose e. No ischemic changes.  F/u echo  Dr. Richmond, Cardiology, following  Recommendations appreciated
Peaked at 109   Likely demand  Asymptomatic  EKG sinus jose e. No ischemic changes.  F/u echo  Dr. Richmond, Cardiology, following  Recommendations appreciated

## 2025-04-15 NOTE — PROGRESS NOTE ADULT - PROBLEM SELECTOR PROBLEM 5
Electrolyte imbalance
Electrolyte imbalance
Demand ischemia
Demand ischemia
Electrolyte imbalance
Demand ischemia
Hypothyroidism
Demand ischemia
Electrolyte imbalance
Demand ischemia
Electrolyte imbalance
Hypothyroidism
Electrolyte imbalance

## 2025-04-15 NOTE — PROGRESS NOTE ADULT - NUTRITIONAL ASSESSMENT
This patient has been assessed with a concern for Malnutrition and has been determined to have a diagnosis/diagnoses of Severe protein-calorie malnutrition and Underweight (BMI < 19).    This patient is being managed with:   Diet NPO-  Entered: Apr 8 2025  7:28AM  
This patient has been assessed with a concern for Malnutrition and has been determined to have a diagnosis/diagnoses of Severe protein-calorie malnutrition and Underweight (BMI < 19).    This patient is being managed with:   Diet Regular-  Supplement Feeding Modality:  Oral  Ensure Enlive Cans or Servings Per Day:  1       Frequency:  Three Times a day  Entered: Apr 8 2025 10:40AM  
This patient has been assessed with a concern for Malnutrition and has been determined to have a diagnosis/diagnoses of Severe protein-calorie malnutrition and Underweight (BMI < 19).    This patient is being managed with:   Diet Regular-  Supplement Feeding Modality:  Oral  Ensure Enlive Cans or Servings Per Day:  1       Frequency:  Three Times a day  Entered: Apr 8 2025 10:40AM    This patient has been assessed with a concern for Malnutrition and has been determined to have a diagnosis/diagnoses of Severe protein-calorie malnutrition and Underweight (BMI < 19).    This patient is being managed with:   Diet Regular-  Supplement Feeding Modality:  Oral  Ensure Enlive Cans or Servings Per Day:  1       Frequency:  Three Times a day  Entered: Apr 8 2025 10:40AM  
This patient has been assessed with a concern for Malnutrition and has been determined to have a diagnosis/diagnoses of Severe protein-calorie malnutrition and Underweight (BMI < 19).    This patient is being managed with:   Diet Regular-  Supplement Feeding Modality:  Oral  Ensure Enlive Cans or Servings Per Day:  1       Frequency:  Three Times a day  Entered: Apr 8 2025 10:40AM  

## 2025-04-15 NOTE — PROGRESS NOTE ADULT - SUBJECTIVE AND OBJECTIVE BOX
Patient is seen and examined at the bed side, is afebrile.  No new events. The discharge plan noted.      REVIEW OF SYSTEMS: All other review systems are negative      ALLERGIES: No Known Allergies      Vital Signs Last 24 Hrs  T(C): 36.5 (15 Apr 2025 16:41), Max: 36.9 (15 Apr 2025 05:09)  T(F): 97.7 (15 Apr 2025 16:41), Max: 98.5 (15 Apr 2025 05:09)  HR: 89 (15 Apr 2025 16:41) (76 - 96)  BP: 106/67 (15 Apr 2025 16:41) (90/48 - 132/79)  BP(mean): 80 (15 Apr 2025 16:41) (62 - 98)  RR: 18 (15 Apr 2025 16:41) (17 - 18)  SpO2: 94% (15 Apr 2025 16:41) (92% - 95%)    Parameters below as of 15 Apr 2025 16:41  Patient On (Oxygen Delivery Method): room air        PHYSICAL EXAM:  GENERAL: Not in acute distress  CHEST/LUNG:  Air entry bilaterally  HEART: s1 and s2 present  ABDOMEN:  Nontender and  Nondistended  EXTREMITIES: No pedal  edema  CNS: Awake and Alert      LABS:                          11.2   8.38  )-----------( 243      ( 15 Apr 2025 05:15 )             36.0                           11.9   7.85  )-----------( 197      ( 10 Apr 2025 05:30 )             37.8                     04-15    140  |  105  |  23[H]  ----------------------------<  90  3.3[L]   |  32[H]  |  1.16    Ca    10.9[H]      15 Apr 2025 05:15  Phos  2.7     04-15  Mg     2.1     04-15      04-10    136  |  100  |  24[H]  ----------------------------<  95  3.1[L]   |  29  |  0.87    Ca    9.5      10 Apr 2025 05:30  Phos  3.0     04-10  Mg     1.7     04-10    TPro  6.4  /  Alb  2.2[L]  /  TBili  0.4  /  DBili  x   /  AST  32  /  ALT  35  /  AlkPhos  111  04-07    PT/INR - ( 06 Apr 2025 11:45 )   PT: 10.9 sec;   INR: 0.93 ratio      PTT - ( 06 Apr 2025 11:45 )  PTT:31.6 sec        MEDICATIONS  (STANDING):    acetaminophen     Tablet .. 1000 milliGRAM(s) Oral every 8 hours  amLODIPine   Tablet 5 milliGRAM(s) Oral daily  fluticasone propionate/ salmeterol 100-50 MICROgram(s) Diskus 1 Dose(s) Inhalation two times a day  heparin   Injectable 5000 Unit(s) SubCutaneous every 12 hours  levothyroxine 88 MICROGram(s) Oral daily  lidocaine   4% Patch 3 Patch Transdermal daily  montelukast 10 milliGRAM(s) Oral at bedtime  naloxone Injectable 0.4 milliGRAM(s) IV Push once  piperacillin/tazobactam IVPB.. 3.375 Gram(s) IV Intermittent every 8 hours  polyethylene glycol 3350 17 Gram(s) Oral daily  senna 2 Tablet(s) Oral at bedtime        RADIOLOGY & ADDITIONAL TESTS:    4/6/25: Xray Femur 2 Views, Left (04.06.25 @ 15:42) IMPRESSION: Subcapital fracture of the left hip. 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      4/6/25: CT Cervical Spine No Cont (04.06.25 @ 13:37) IMPRESSION:  CT HEAD : No traumatic intracranial abnormality.    Questionable small left frontal encephalocele extending into the left ethmoid sinus and slightly protruding into the left superomedial orbit.   Recommend MRI of the brain seizure protocol without and with contrast for further evaluation.    Bilateral sphenoid sinus disease.    CT CERVICAL SPINE:    No acute fracture or traumatic malalignment.  Multilevel degenerative changes.      MICROBIOLOGY DATA:    Culture - Blood (04.06.25 @ 11:55)   Specimen Source: Blood Blood  Culture Results: No growth at 5 days    Culture - Blood (04.06.25 @ 11:45)   Specimen Source: Blood Blood  Culture Results: No growth at 5 days      Urine Microscopic-Add On (NC) (04.06.25 @ 11:55)   White Blood Cell - Urine: 2 /HPF  Red Blood Cell - Urine: 2 /HPF  Bacteria: Moderate /HPF  Squamous Epithelial Cells: Present